# Patient Record
Sex: FEMALE | Race: BLACK OR AFRICAN AMERICAN | Employment: OTHER | URBAN - METROPOLITAN AREA
[De-identification: names, ages, dates, MRNs, and addresses within clinical notes are randomized per-mention and may not be internally consistent; named-entity substitution may affect disease eponyms.]

---

## 2017-04-24 ENCOUNTER — OFFICE VISIT (OUTPATIENT)
Dept: SURGERY | Age: 57
End: 2017-04-24

## 2017-04-24 DIAGNOSIS — E66.01 OBESITY, MORBID, BMI 40.0-49.9 (HCC): Primary | ICD-10-CM

## 2017-04-27 VITALS — HEIGHT: 66 IN | WEIGHT: 259 LBS | BODY MASS INDEX: 41.62 KG/M2

## 2017-05-22 ENCOUNTER — CLINICAL SUPPORT (OUTPATIENT)
Dept: SURGERY | Age: 57
End: 2017-05-22

## 2017-05-22 VITALS — WEIGHT: 262 LBS | HEIGHT: 66 IN | BODY MASS INDEX: 42.11 KG/M2

## 2017-05-22 DIAGNOSIS — E66.01 OBESITY, MORBID, BMI 40.0-49.9 (HCC): Primary | ICD-10-CM

## 2017-05-22 NOTE — PATIENT INSTRUCTIONS
Goals: 1. Eat within 2 hours of waking up; can use a protein shake as a meal replacement or snack  2. Eat 3 balanced meals per day (no skipping meals)  3.  Decrease your high sugar/high carbohydrate snacks and replace with high protein snacks

## 2017-06-12 ENCOUNTER — OFFICE VISIT (OUTPATIENT)
Dept: SURGERY | Age: 57
End: 2017-06-12

## 2017-06-12 VITALS
OXYGEN SATURATION: 100 % | RESPIRATION RATE: 16 BRPM | HEART RATE: 84 BPM | BODY MASS INDEX: 41.78 KG/M2 | SYSTOLIC BLOOD PRESSURE: 145 MMHG | HEIGHT: 66 IN | WEIGHT: 260 LBS | DIASTOLIC BLOOD PRESSURE: 63 MMHG

## 2017-06-12 DIAGNOSIS — M17.11 PRIMARY OSTEOARTHRITIS OF RIGHT KNEE: ICD-10-CM

## 2017-06-12 DIAGNOSIS — E66.01 MORBID OBESITY DUE TO EXCESS CALORIES (HCC): Primary | ICD-10-CM

## 2017-06-12 DIAGNOSIS — I10 ESSENTIAL HYPERTENSION: ICD-10-CM

## 2017-06-12 DIAGNOSIS — J45.20 MILD INTERMITTENT ASTHMA WITHOUT COMPLICATION: ICD-10-CM

## 2017-06-12 DIAGNOSIS — R73.03 BORDERLINE DIABETES: ICD-10-CM

## 2017-06-12 DIAGNOSIS — E66.01 MORBID OBESITY WITH BMI OF 40.0-44.9, ADULT (HCC): ICD-10-CM

## 2017-06-12 DIAGNOSIS — T41.45XA: ICD-10-CM

## 2017-06-12 DIAGNOSIS — M17.12 PRIMARY OSTEOARTHRITIS OF LEFT KNEE: ICD-10-CM

## 2017-06-12 DIAGNOSIS — K21.9 GASTROESOPHAGEAL REFLUX DISEASE WITHOUT ESOPHAGITIS: ICD-10-CM

## 2017-06-12 NOTE — PROGRESS NOTES
Bariatric Surgery Consultation    Subjective: The patient is a 61 y.o. obese female with a Body mass index is 41.97 kg/(m^2). Akiratommy Dcs The patient is at her heaviest weight for the past 5 years. she has been overweight since  Age 35.   she has been considering surgery since last year. she desires surgery at this time because of multiple health concerns and their lifestyle issues which are hindered by their weight. she has been referred by his family physician Dr Daksha Wren for evaluation and treatment of their obesity via surgical intervention. Luna Velez has tried multiple diets in her lifetime most recently tried physician supervised, behavior modification, unsupervised diets, Weight Watchers, Atkins and prescription diet pills    Bariatric comorbidities present are   Patient Active Problem List   Diagnosis Code    Osteoarthritis of left knee M17.12    Morbid obesity with BMI of 40.0-44.9, adult ([facility]) E66.01, Z68.41    Osteoarthritis of right knee M17.11    Borderline diabetes R73.03       The patient is considering laparoscopic sleeve gastrectomy for surgical weight loss due to their ineffective progress with medical forms of weight loss and the urging of their physician who cares for their primary medical issues. The patient  now presents  for consideration for weight loss surgery understanding the benefits of this over a medical approach of weight loss as was discussed in our presentation on weight loss surgery. They have discussed their plans both with their family and primary care physician who is in support of their pursuit of such. The patient has not had health issues as of late and denies and gastrointestinal disturbances other than what is outlined below in their review of symptoms. All of their prior evaluations available by both their PCP's and specialists physicians have been reviewed today either in the Care Everywhere portal or scanned under the media tab.     I have spent a large portion of my initial consultation today reviewing the patients current dietary habits which have contributed to their health issues and obesity. I have suggested to them personally a dietary regimen that they can initiate now to help with their status as it pertains to their weight. They understand that the most important aspect of their journey through their weight loss endeavor will be their adherence to a new lifestyle of healthy eating behavior. They also understand that an adherence to an exercise program will not only help with weight loss but is ultimately important in weight maintenance. The patients goal weight is 170lb. These goals are consistent with expected outcomes of their desired operation. her Medical goals are resolution of these health issues. Patient Active Problem List    Diagnosis Date Noted    Morbid obesity with BMI of 40.0-44.9, adult ([facility] [address]     Osteoarthritis of right knee     Borderline diabetes     Osteoarthritis of left knee 2016     Past Surgical History:   Procedure Laterality Date    HX  SECTION   &     HX HERNIA REPAIR      umbilical x2    HX HYSTERECTOMY      RYNE    HX LAP CHOLECYSTECTOMY      [address] [facility] ORTHOP[facility]EDIC Right     partial knee replacement    HX [facility]ROIDECTOMY       [facility]    HX TONSILLECTOMY        Social History   Substance Use Topics    Smoking status: Former Smoker     Quit date: 3/9/2002    Smokeless tobacco: Never Used    Alcohol use Yes      Comment: 2 alcohol beverages per month      No family history on file. Current Outpatient Prescriptions   Medication Sig Dispense Refill    cranberry extract 450 mg tab Take 450 mg by mouth nightly.  gabapentin (NEURONTIN) 300 mg capsule Take 900 mg by mouth two (2) times a day.  levothyroxine (SYNTHROID) 125 mcg tablet Take 112 mcg by mouth Daily (before breakfast).       zolpidem (AMBIEN) 10 mg tablet Take 10 mg by mouth nightly as needed for Sleep.  cyclobenzaprine (FLEXERIL) 10 mg tablet Take 10 mg by mouth every eight (8) hours as needed for Muscle Spasm(s).  dimethyl fumarate 240 mg cpDR Take 240 mg by mouth two (2) times a day.  baclofen (LIORESAL) 10 mg tablet Take 10 mg by mouth two (2) times a day. Indications: MS      telmisartan (MICARDIS) 40 mg tablet Take 40 mg by mouth daily. Indications: HYPERTENSION      cycloSPORINE (RESTASIS) 0.05 % ophthalmic emulsion Administer 1 Drop to both eyes two (2) times a day.  pantoprazole (PROTONIX) 40 mg tablet Take 40 mg by mouth nightly. Indications: GASTROESOPHAGEAL REFLUX      EPINEPHrine (EPIPEN) 0.3 mg/0.3 mL (1:1,000) injection 0.3 mg by IntraMUSCular route once as needed for Anaphylaxis (Iodine/Shellfish). Indications: ANAPHYLAXIS      DULoxetine (CYMBALTA) 60 mg capsule Take 60 mg by mouth every evening.  Indications: ANXIETY WITH DEPRESSION, NEUROPATHIC PAIN      oxyCODONE-acetaminophen (PERCOCET) 5-325 mg per tablet Take 1 to 2 tab PO q 4-6 hrs prn pain 60 Tab 0     Allergies   Allergen Reactions    Shellfish Derived Shortness of Breath and Swelling    Sulfa (Sulfonamide Antibiotics) Itching          Review of Systems:       General - No history or complaints of unexpected fever, chills, or weight loss  Head/Neck - No history or complaints of headache, diplopia, dysphagia, hearing loss  Cardiac - No history or complaints of chest pain, palpitations, murmur, or shortness of breath  Pulmonary - No history or complaints of shortness of breath, productive cough, hemoptysis  Gastrointestinal - minimal reflux,no  abdominal pain, obstipation/constipation or blood per rectum  Genitourinary - No history or complaints of hematuria/dysuria, stress urinary incontinence symptoms, or renal lithiasis  Musculoskeletal - severe joint pain in their knees,  no muscular weakness  Hematologic - No history or complaints of bleeding disorders,  No blood transfusions  Neurologic - No history or complaints of  migraine headaches, seizure activity, syncopal episodes, TIA or stroke  Integumentary - No history or complaints of rashes, abnormal nevi, skin cancer  Gynecological - n/a               Objective:     Visit Vitals    /63 (BP 1 Location: Left arm, BP Patient Position: Sitting)    Pulse 84    Resp 16    Ht 5' 6\" (1.676 m)    Wt 117.9 kg (260 lb)    SpO2 100%    BMI 41.97 kg/m2       Physical Examination: General appearance - alert, well appearing, and in no distress and oriented to person, place, and time  Mental status - alert, oriented to person, place, and time, normal mood, behavior, speech, dress, motor activity, and thought processes  Eyes - pupils equal and reactive, extraocular eye movements intact, sclera anicteric, left eye normal, right eye normal  Ears - bilateral TM's and external ear canals normal, right ear normal, left ear normal  Nose - normal and patent, no erythema, discharge or polyps  Mouth - mucous membranes moist, pharynx normal without lesions  Neck - supple, no significant adenopathy  Lymphatics - no palpable lymphadenopathy, no hepatosplenomegaly  Chest - clear to auscultation, no wheezes, rales or rhonchi, symmetric air entry  Heart - normal rate, regular rhythm, normal S1, S2, no murmurs, rubs, clicks or gallops  Abdomen - soft, nontender, nondistended, no masses or organomegaly  Back exam - full range of motion, no tenderness, palpable spasm or pain on motion  Neurological - alert, oriented, normal speech, no focal findings or movement disorder noted  Musculoskeletal - no joint tenderness, deformity or swelling  Extremities - peripheral pulses normal, no pedal edema, no clubbing or cyanosis  Skin - normal coloration and turgor, no rashes, no suspicious skin lesions noted    Labs:       No results found for this or any previous visit (from the past 1440 hour(s)).     Assessment:     Morbid obesity with comorbidity    Plan:     laparoscopic sleeve gastrectomy    This is a 61 y.o. female with a BMI of Body mass index is 41.97 kg/(m^2). and the weight-related co-morbidties as noted below. Ramonita Snyder meets the NIH criteria for bariatric surgery based upon the BMI of Body mass index is 41.97 kg/(m^2). and multiple weight-related co-morbidties. Ramonita Snyder has elected laparoscopic sleeve gastrectomy as her intervention of choice for treatment of morbid obestiy through surgical means secondary to its safety profile, rapid return to work  and decreases in operative risks over gastric bypass. In the office today, following Fabiola's history and physical examination, a 30 minute discussion regarding the anatomic alterations for the laparoscopic sleeve gastrectomy was undertaken. The dietary expectations and the patient and physician dependent factors for success were thoroughly discussed, to include the need for interval follow-up and long-term dietary changes associated with success. The possible complications of the sleeve gastrectomy  were also discussed, to include;death, DVT/PE, staple line leak, bleeding, stricture formation, infection, nutritional deficiencies and sleeve dilation. Specific weight related outcomes for success were also discussed with an emphasis on careful and close follow-up with the first year and eating behavior modification as the baseline and cyclical hunger return. The patient expressed an understanding of the above factors, and her questions were answered in their entirety. In addition, the patient attended a 1.5 hour power point seminar regarding obesity, surgical weight loss including, adjustable gastric band, gastric bypass, and sleeve gastrectomy. This discussion contrasted the different surgical techniques, mechanisms of actions and expected outcomes, and surgical and medical risks associated with each procedure.   During this seminar, there was a long question and answer session where each questions was answered until there were no additional questions. Today, the patient had all of her questions answered and desires to proceed with  bariatric surgery initially choosing sleeve gastrectomy as her surgical option. Secondary Diagnoses:     Adult Onset Diabetes - The patient has natalia given a very low carbohydrate diet preoperatively along with instructions to monitor their blood sugars on a regular daily basis. When  their surgery is performed  we will be monitoring the patient with sliding scale insulin and accuchecks.  Based on those values we will determine whether the patient needs a reduction of those medications postoperatively or total removal of those medications on discharge.  We will have the patient continue accuchecks postoperatively while at home also and report to me or their family physician for appropriate adjustments as needed.  The patient also understands that in the event of uncontrolled blood sugar preoperatively that we may choose to postpone their surgery. Dietary Intervention  - The patient is currently scheduled to see or has been followed by a bariatric nutritionist for an attempt at preoperative weight loss as has been dictated by their insurance carrier. They will be assessed at various times during their follow up to evaluate their progress depending on the length of time that is required once again by their carrier. I have explained the importance of preoperative weight loss and the benefits regarding lower surgical risk and also assisting the patient in reaching their weight loss goal.  Finally they understand there is a physiologic benefit from the standpoint of hepatic volume reduction and reduction of central visceral adiposity preoperatively.   I have reiterated the importance of a low carbohydrate and high protein regimen to achieve their stated goal. I have reviewed their current eating behavior prior to this encounter and explained to them in an exhaustive fashion the appropriate diet that they should adhere to. They have been encouraged to loose weight pre operatively and understand it is our prerogative to cancel surgery or postpone their procedure in the event of significant weight gain. GERD -The patient understands that weight loss surgery is not a guaranteed cure for reflux disease but does understand the benefits that weight loss can have on reflux disease.  They also understand that at the time of surgery the gastroesophageal junction will be evaluated for the presence of a diaphragmatic hernia.  Hernias will be corrected always with the gastric band and sleeve gastrectomy procedures, but only on a case by case basis with the gastric bypass if it prevents our ability to perform the operation at hand, or if I feel that they would benefit long term with correction of this issue.  The patient also understands that neither weight loss surgery nor repair of a diaphragmatic hernia repair guarantees the complete cessation of the disease. They also understand there is a possibility of recurrence with a simple crural repair as is performed with these procedures. They understand they may have to continue their medications in the postoperative period. They have a good understanding that the gastric bypass procedure is better suited to total resolution of this issue and that neither the Lap Band nor sleeve gastrectomy is considered a curative procedure as it pertains to this diagnosis. Hypertension - The patient has a clear understanding of how weight loss improves hypertension as a whole, but also they understand that there is a significant genetic component to this disease process.  We will monitor the patients blood pressure while in the hospital and the plan would be to continue those medications postoperatively.  If a diuretic is being used we will stop them on discharge to prevent dehydration particularly with the sleeve gastrectomy and the gastric bypass procedures.  They will be instructed to monitor their blood pressure postoperatively while at home and notify their primary care physician in the event of any significantly high or uncharacteristic readings. Weight Related Arthritis -The patient understands the benefits that weight loss surgery can have on their arthritis but also understands that weight loss is not a guaranteed cure and relief of symptoms is often dependent on the severity of the underlying disease.  The patient also understands that traditional pharmaceutical treatments for this diagnosis are usually unavailable to post-operative weight loss patients due to the effects on the gastrointestinal tract particularly with the gastric bypass and to a lesser effect with the sleeve gastrectomy.  Any changes to the patients medication treatment will ultimately be made the patients PCP with input by our office. Hyperlipidemia - The patient understands that studies show that almost all patient will realize an improvement in their lipid profile with weight loss that occurs with these procedures. They however also understand that hyperlipidemia is a multifactorial disease particularly as it pertains to their genetic background and that there is no guarantee toward cure  of this issue. We will resume their medications both pre operatively and immediately postoperatively as this tends to decrease any post operative cardiac events.  The patient will follow up with their family physician in the postoperative period with plans to repeat their lipid panel 2-3 month postoperative for potential adjustment or removal of these medications. Multiple Sclerosis- Patient well controlled on meds and has let her Neurologist know she is having surgery.     Signed By: Solomon Regalado MD     June 12, 2017

## 2017-06-12 NOTE — PATIENT INSTRUCTIONS
Body Mass Index: Care Instructions  Your Care Instructions    Body mass index (BMI) can help you see if your weight is raising your risk for health problems. It uses a formula to compare how much you weigh with how tall you are. · A BMI lower than 18.5 is considered underweight. · A BMI between 18.5 and 24.9 is considered healthy. · A BMI between 25 and 29.9 is considered overweight. A BMI of 30 or higher is considered obese. If your BMI is in the normal range, it means that you have a lower risk for weight-related health problems. If your BMI is in the overweight or obese range, you may be at increased risk for weight-related health problems, such as high blood pressure, heart disease, stroke, arthritis or joint pain, and diabetes. If your BMI is in the underweight range, you may be at increased risk for health problems such as fatigue, lower protection (immunity) against illness, muscle loss, bone loss, hair loss, and hormone problems. BMI is just one measure of your risk for weight-related health problems. You may be at higher risk for health problems if you are not active, you eat an unhealthy diet, or you drink too much alcohol or use tobacco products. Follow-up care is a key part of your treatment and safety. Be sure to make and go to all appointments, and call your doctor if you are having problems. It's also a good idea to know your test results and keep a list of the medicines you take. How can you care for yourself at home? · Practice healthy eating habits. This includes eating plenty of fruits, vegetables, whole grains, lean protein, and low-fat dairy. · If your doctor recommends it, get more exercise. Walking is a good choice. Bit by bit, increase the amount you walk every day. Try for at least 30 minutes on most days of the week. · Do not smoke. Smoking can increase your risk for health problems. If you need help quitting, talk to your doctor about stop-smoking programs and medicines. These can increase your chances of quitting for good. · Limit alcohol to 2 drinks a day for men and 1 drink a day for women. Too much alcohol can cause health problems. If you have a BMI higher than 25  · Your doctor may do other tests to check your risk for weight-related health problems. This may include measuring the distance around your waist. A waist measurement of more than 40 inches in men or 35 inches in women can increase the risk of weight-related health problems. · Talk with your doctor about steps you can take to stay healthy or improve your health. You may need to make lifestyle changes to lose weight and stay healthy, such as changing your diet and getting regular exercise. If you have a BMI lower than 18.5  · Your doctor may do other tests to check your risk for health problems. · Talk with your doctor about steps you can take to stay healthy or improve your health. You may need to make lifestyle changes to gain or maintain weight and stay healthy, such as getting more healthy foods in your diet and doing exercises to build muscle. Where can you learn more? Go to http://astrid-jesusita.info/. Enter S176 in the search box to learn more about \"Body Mass Index: Care Instructions. \"  Current as of: January 23, 2017  Content Version: 11.2  © 6716-1519 [employer]. Care instructions adapted under license by [employer] (which disclaims liability or warranty for this information). If you have questions about a medical condition or this instruction, always ask your healthcare professional. [place] [address] any warranty or liability for your use of this information. Patient Instructions      1. Continue to monitor carbohydrate and protein intake- remember to keep your           total  carbohydrates to 50 grams or less per day for best results.   2. Remember hydration goals - usually 48 to 64 ounces of liquids per day  3. Continue to work towards exercise goals - minimum 3 days per week of 45          minutes to  1 hour at a time. 4. Remember to take vitamins as directed        Supplement Resource Guide    Importance of Protein:   Maintains lean body mass, produces antibodies to fight off infections, heals wounds, minimizes hair loss, helps to give you energy, helps with satiety, and keeping you full between meals. Importance of Calcium:  Needed for healthy bones and teeth, normal blood clotting, and nervous system functioning, higher risk of osteoporosis and bone disease with non-compliance. Importance of Multivitamins: Many functions. Supply you with extra nutrients that you may be missing from food. May lead to iron deficiency anemia, weakness, fatigue, and many other symptoms with non-compliance. Importance of B Vitamins:  Important for red blood cell formation, metabolism, energy, and helps to maintain a healthy nervous system. Protein Supplement  Find one you like now. Use immediately after surgery. Look for:  35-50g protein each day from your protein supplement once you reach the progression diet. 0-3 g fat per serving  0-3 g sugar per serving    Protein drinks should be split in separate dosages. Recommend: Lifelong  1 year + Calcium Supplement:     Start taking within a month after surgery. Look for: Calcium Citrate Plus D (1500 mg per day)  Recommend: Citracal     .          Avoid chocolate chewable calcium. Can use chewable bariatric or [employer] brand or similar chewable. The body cannot absorb more than 500-600 mg @ a time. Take for Life Multi-vitamin Supplement:      1st Month After Surgery: Any complete chewable, such as: [place]s Complete chewables. Avoid [place] sours or gummies. They lack iron and other important nutrients and also have added sugar.     Continue with chewable vitamin or change to adult complete multivitamin one month after surgery. Menstruating women can take a prenatal vitamin. Make sure has at least 18 mg iron and [id number] mcg folic acid):    Vitamin B12, B Complex Vitamin, and Biotin  Start taking within a month after surgery. Vitamin B12:  1000 mcg of Vitamin B12 three times weekly    Must take sublingually (meaning you take it under your tongue) or in a liquid drop form for easy absorption. B Complex Vitamin: Take a pill or liquid drop form once daily. Biotin: This vitamin can help prevent hair loss.     Recommend 5mg   (5000 mcg) a day  Biotin is Optional

## 2017-06-19 ENCOUNTER — CLINICAL SUPPORT (OUTPATIENT)
Dept: SURGERY | Age: 57
End: 2017-06-19

## 2017-06-19 VITALS — WEIGHT: 260 LBS | HEIGHT: 66 IN | BODY MASS INDEX: 41.78 KG/M2

## 2017-06-19 DIAGNOSIS — E66.01 MORBID OBESITY WITH BMI OF 40.0-44.9, ADULT (HCC): Primary | ICD-10-CM

## 2017-06-19 NOTE — PROGRESS NOTES
Rogelio Hare participated in an educational session on the importance of starting to make healthy choices prior to weight loss surgery. General healthy foods were reviewed. Diet history was reviewed. Patient set a dietary, exercise, and behavioral goal in order to start making healthy changes now.      Visit Vitals    Ht 5' 6\" (1.676 m)    Wt 117.5 kg (259 lb)    BMI 41.8 kg/m2     Mike Fuentes RD

## 2017-06-19 NOTE — PROGRESS NOTES
Medical Weight Loss Multi-Disciplinary Program    Name: Cari Ugalde   : 1960    Session# 3  Date: 2017     Height: 5' 6\" (167.6 cm)    Weight: 117.9 kg (260 lb) lbs. Body mass index is 41.97 kg/(m^2). Pounds Lost: 2    Dietary Instructions    Reviewed intake  Understanding low carbohydrates, low sugar, higher protein meals  Understanding proper portions  Instruction given for personal dietary changes  Discussed perceived compliance  Comments: Diet hx reviewed and personal dietary changes discussed. Physical Activity/Exercise    Discussed Perceived Compliance  Reasonable Goals Set  Motivation  Comments: Pt is walking for 30-45 minutes, 4-5 times per week. She plans to continue/increase exercise to 4-5 times per week for an hour. Behavior Modification    Achieving/Rewarding goals met  Positive attitude  Discussed perceived compliance  Comments: Pt is doing well exercising and plans to increase/continue. She has increased water, decreased skipping meals, decreased soda, decreased candy, and decreased portions. She is working to cut back on carbohydrate foods, stop drinking all soda and sweet tea with sugar, stop eating all candy, and set a timer to remind self to not skip meals. Can continue to replace one meal daily with a protein shake. What have you tried in the past to lose weight? [employer], [employer], fad diets such as cabbage soup, diet pills, diet centers-physician supervised, dietitian visits    Why did you not lose weight or keep weight off? Did lose weight- now thinks good, so stops the program and goes back to bad habits. Would lose only about 10 pounds. How do you think the surgery will make a difference? Amount of weight will lose, feel good about self, stick to it, prepared for the lifestyle change the surgery requires now that has been through so many diet programs.      Candidate for surgery (per RD): pending    Dietitian: Lito Kaye RD

## 2017-07-20 ENCOUNTER — CLINICAL SUPPORT (OUTPATIENT)
Dept: SURGERY | Age: 57
End: 2017-07-20

## 2017-07-20 VITALS — HEIGHT: 66 IN | BODY MASS INDEX: 41.62 KG/M2 | WEIGHT: 259 LBS

## 2017-07-20 DIAGNOSIS — E66.01 MORBID OBESITY WITH BMI OF 40.0-44.9, ADULT (HCC): Primary | ICD-10-CM

## 2017-07-20 NOTE — MR AVS SNAPSHOT
Visit Information Date & Time Provider Department Dept. Phone Encounter #  
 7/20/2017 11:00 AM TSS [facility] [place] [phone] [id number] Upcoming Health Maintenance Date Due Hepatitis C Screening 1960 Pneumococcal 19-64 Highest Risk (1 of 3 - PCV13) 11/15/1978 DTaP/Tdap/Td series (1 - Tdap) 11/15/1980 PAP AKA CERVICAL CYTOLOGY 11/15/1980 BREAST CANCER SCRN MAMMOGRAM 11/15/2009 FOBT Q 1 YEAR AGE 50-75 11/15/2009 INFLUENZA AGE 9 TO ADULT 8/1/2017 Allergies as of 7/20/2017  Review Complete On: 6/12/2017 By: Lacretia Sober Severity Noted Reaction Type Reactions Shellfish Derived High 03/09/2016   Systemic Shortness of Breath, Swelling Sulfa (Sulfonamide Antibiotics)  03/09/2016    Itching Current Immunizations  Never Reviewed No immunizations on file. Not reviewed this visit Vitals Height(growth percentile) Weight(growth percentile) BMI OB Status Smoking Status 5' 6\" (1.676 m) 259 lb (117.5 kg) 41.8 kg/m2 Hysterectomy Former Smoker BMI and BSA Data Body Mass Index Body Surface Area  
 41.8 kg/m 2 2.34 m 2 Preferred Pharmacy Pharmacy Name Phone [id number] [place] [address] [phone] Your Updated Medication List  
  
   
This list is accurate as of: 7/20/17 11:19 AM.  Always use your most recent med list.  
  
  
  
  
 baclofen 10 mg tablet Commonly known as:  LIORESAL Take 10 mg by mouth two (2) times a day. Indications: MS  
  
 cranberry extract 450 mg Tab tablet Take 450 mg by mouth nightly. cyclobenzaprine 10 mg tablet Commonly known as:  FLEXERIL Take 10 mg by mouth every eight (8) hours as needed for Muscle Spasm(s). cycloSPORINE 0.05 % ophthalmic emulsion Commonly known as:  RESTASIS Administer 1 Drop to both eyes two (2) times a day.   
  
 dimethyl fumarate 240 mg Cpdr  
 Take 240 mg by mouth two (2) times a day. DULoxetine 60 mg capsule Commonly known as:  CYMBALTA Take 60 mg by mouth every evening. Indications: ANXIETY WITH DEPRESSION, NEUROPATHIC PAIN  
  
 EPINEPHrine 0.3 mg/0.3 mL injection Commonly known as:  EPIPEN  
0.3 mg by IntraMUSCular route once as needed for Anaphylaxis (Iodine/Shellfish). Indications: ANAPHYLAXIS  
  
 gabapentin 300 mg capsule Commonly known as:  NEURONTIN Take 900 mg by mouth two (2) times a day. oxyCODONE-acetaminophen 5-325 mg per tablet Commonly known as:  PERCOCET Take 1 to 2 tab PO q 4-6 hrs prn pain  
  
 pantoprazole 40 mg tablet Commonly known as:  PROTONIX Take 40 mg by mouth nightly. Indications: GASTROESOPHAGEAL REFLUX  
  
 SYNTHROID 125 mcg tablet Generic drug:  levothyroxine Take 112 mcg by mouth Daily (before breakfast). telmisartan 40 mg tablet Commonly known as:  Izora Lakesha Take 40 mg by mouth daily. Indications: HYPERTENSION  
  
 zolpidem 10 mg tablet Commonly known as:  AMBIEN Take 10 mg by mouth nightly as needed for Sleep. Patient Instructions Goals: 1. Between now and surgery, when gets back from trip on Aug. 8, will completely stop candy and soda/sweet tea (has done this before and was successful). Will drink water and protein drinks only. 2. Use a timer to remind self to eat at least 3 meals per day, can use a protein drink for 1 meal daily. 3. Do not use Glucerna after surgery. Purchase premier. 4. Goal to get back to walking 5 days per week for 30-60 minutes. Introducing [facility]! Cameron Wong introduces [employer] patient portal. Now you can access parts of your medical record, email your doctor's office, and request medication refills online. 1. In your internet browser, go to [email or web address] 2. Click on the First Time User? Click Here link in the Sign In box. You will see the New Member Sign Up page. 3. Enter your [employer] Access Code exactly as it appears below. You will not need to use this code after youve completed the sign-up process. If you do not sign up before the expiration date, you must request a new code. · [employer] Access Code: [email or web address] Expires: 9/14/2017  2:23 PM 
 
4. Enter the last four digits of your Social Security Number (xxxx) and Date of Birth (mm/dd/yyyy) as indicated and click Submit. You will be taken to the next sign-up page. 5. Create a [employer] ID. This will be your [employer] login ID and cannot be changed, so think of one that is secure and easy to remember. 6. Create a [employer] password. You can change your password at any time. 7. Enter your Password Reset Question and Answer. This can be used at a later time if you forget your password. 8. Enter your e-mail address. You will receive e-mail notification when new information is available in [address] 9. Click Sign Up. You can now view and download portions of your medical record. 10. Click the Download Summary menu link to download a portable copy of your medical information. If you have questions, please visit the Frequently Asked Questions section of the [employer] website. Remember, [employer] is NOT to be used for urgent needs. For medical emergencies, dial 911. Now available from your iPhone and Android! Please provide this summary of care documentation to your next provider. Your primary care clinician is listed as Luz Maria Dominique. If you have any questions after today's visit, please call [phone].

## 2017-07-20 NOTE — PROGRESS NOTES
Medical Weight Loss Multi-Disciplinary Program    Name: Norma Davis   : 1960    Session# 4  Date: 2017     Height: 5' 6\" (167.6 cm)    Weight: 117.5 kg (259 lb) lbs. Body mass index is 41.8 kg/(m^2). Pounds Lost: 1    Dietary Instructions    Reviewed intake  Understanding low carbohydrates, low sugar, higher protein meals  Understanding proper portions  Instruction given for personal dietary changes  Discussed perceived compliance  Comments: Diet hx reviewed and personal dietary changes discussed. Diet hx: B-skipped, S-banana taffy, L-6 chicken nuggets and benoit and sweet tea, D- cubed steak and gravy, rice and green beans, more banana taffy, water    Physical Activity/Exercise    Discussed Perceived Compliance  Reasonable Goals Set  Motivation  Comments: Walked some this month, 4-5 times per week, 30-60 minutes, but stopped for two weeks. Goal to get back to walking 5 days per week for 30-60 minutes. Behavior Modification    Achieving/Rewarding goals met  Positive attitude  Discussed perceived compliance  Comments: Pt exercised some this month and plans to get back to walking routine. Decreased sugared beverages to no more than 1 daily (down from 3 per day). Did OK the first week at not skipping meals, then got ready and went on a trip and did not keep up with meals- skipping again. Did decrease soda and candy. Goals between now and surgery:  1. Between now and surgery, when gets back from trip on Aug. 8, will completely stop candy and soda/sweet tea (has done this before and was successful). Will drink water, sugar free and carbonation free flavored water, and protein drinks only. 2. Use a timer to remind self to eat at least 3 meals per day, can use a protein drink for 1 meal daily. 3. Do not use Glucerna after surgery. Purchase Premier. 4. Goal to get back to walking 5 days per week for 30-60 minutes.      Candidate for surgery (per RD): YES    Dietitian: Ed Balderrama RD

## 2017-07-20 NOTE — PATIENT INSTRUCTIONS
Goals: 1. Between now and surgery, when gets back from trip on Aug. 8, will completely stop candy and soda/sweet tea (has done this before and was successful). Will drink water and protein drinks only. 2. Use a timer to remind self to eat at least 3 meals per day, can use a protein drink for 1 meal daily. 3. Do not use Glucerna after surgery. Purchase premier. 4. Goal to get back to walking 5 days per week for 30-60 minutes.

## 2017-08-02 ENCOUNTER — APPOINTMENT (OUTPATIENT)
Dept: GENERAL RADIOLOGY | Age: 57
End: 2017-08-02
Attending: SPECIALIST
Payer: MEDICARE

## 2017-08-02 ENCOUNTER — HOSPITAL ENCOUNTER (OUTPATIENT)
Age: 57
Setting detail: OUTPATIENT SURGERY
Discharge: HOME | End: 2017-08-02
Attending: SPECIALIST | Admitting: SPECIALIST
Payer: MEDICARE

## 2017-08-02 VITALS
SYSTOLIC BLOOD PRESSURE: 145 MMHG | WEIGHT: 263.5 LBS | TEMPERATURE: 98.1 F | RESPIRATION RATE: 14 BRPM | OXYGEN SATURATION: 100 % | BODY MASS INDEX: 42.53 KG/M2 | DIASTOLIC BLOOD PRESSURE: 69 MMHG | HEART RATE: 81 BPM

## 2017-08-02 DIAGNOSIS — E66.01 MORBID OBESITY (HCC): ICD-10-CM

## 2017-08-02 PROCEDURE — 74240 X-RAY XM UPR GI TRC 1CNTRST: CPT

## 2017-08-02 PROCEDURE — 76040000019: Performed by: SPECIALIST

## 2017-08-02 PROCEDURE — 74011000255 HC RX REV CODE- 255: Performed by: SPECIALIST

## 2017-08-02 NOTE — IP AVS SNAPSHOT
[address] [address] 
[phone] Patient: Cally Peter MRN: [id number] IIT:08[birth year]11 Current Discharge Medication List  
  
ASK your doctor about these medications Dose & Instructions Dispensing Information Comments Morning Noon Evening Bedtime  
 baclofen 10 mg tablet Commonly known as:  LIORESAL Your last dose was: Your next dose is:    
   
   
 Dose:  10 mg Take 10 mg by mouth two (2) times a day. Indications: MS Refills:  0  
     
   
   
   
  
 cranberry extract 450 mg Tab tablet Your last dose was: Your next dose is:    
   
   
 Dose:  450 mg Take 450 mg by mouth nightly. Refills:  0  
     
   
   
   
  
 cyclobenzaprine 10 mg tablet Commonly known as:  FLEXERIL Your last dose was: Your next dose is:    
   
   
 Dose:  10 mg Take 10 mg by mouth every eight (8) hours as needed for Muscle Spasm(s). Refills:  0  
     
   
   
   
  
 cycloSPORINE 0.05 % ophthalmic emulsion Commonly known as:  RESTASIS Your last dose was: Your next dose is:    
   
   
 Dose:  1 Drop Administer 1 Drop to both eyes two (2) times a day. Refills:  0  
     
   
   
   
  
 dimethyl fumarate 240 mg Cpdr  
   
Your last dose was: Your next dose is:    
   
   
 Dose:  240 mg Take 240 mg by mouth two (2) times a day. Refills:  0 DULoxetine 60 mg capsule Commonly known as:  CYMBALTA Your last dose was: Your next dose is:    
   
   
 Dose:  60 mg Take 60 mg by mouth every evening. Indications: ANXIETY WITH DEPRESSION, NEUROPATHIC PAIN Refills:  0 EPINEPHrine 0.3 mg/0.3 mL injection Commonly known as:  Diana Sanchez Your last dose was:     
   
Your next dose is:    
   
   
 Dose:  0.3 mg  
0.3 mg by IntraMUSCular route once as needed for Anaphylaxis (Iodine/Shellfish). Indications: ANAPHYLAXIS Refills:  0  
     
   
   
   
  
 gabapentin 300 mg capsule Commonly known as:  NEURONTIN Your last dose was: Your next dose is:    
   
   
 Dose:  900 mg Take 900 mg by mouth two (2) times a day. Refills:  0  
     
   
   
   
  
 oxyCODONE-acetaminophen 5-325 mg per tablet Commonly known as:  PERCOCET Your last dose was: Your next dose is: Take 1 to 2 tab PO q 4-6 hrs prn pain Quantity:  60 Tab Refills:  0  
     
   
   
   
  
 pantoprazole 40 mg tablet Commonly known as:  PROTONIX Your last dose was: Your next dose is:    
   
   
 Dose:  40 mg Take 40 mg by mouth nightly. Indications: GASTROESOPHAGEAL REFLUX Refills:  0  
     
   
   
   
  
 SYNTHROID 125 mcg tablet Generic drug:  levothyroxine Your last dose was: Your next dose is:    
   
   
 Dose:  112 mcg Take 112 mcg by mouth Daily (before breakfast). Refills:  0  
     
   
   
   
  
 telmisartan 40 mg tablet Commonly known as:  Kya Cart Your last dose was: Your next dose is:    
   
   
 Dose:  40 mg Take 40 mg by mouth daily. Indications: HYPERTENSION Refills:  0  
     
   
   
   
  
 zolpidem 10 mg tablet Commonly known as:  AMBIEN Your last dose was: Your next dose is:    
   
   
 Dose:  10 mg Take 10 mg by mouth nightly as needed for Sleep. Refills:  0

## 2017-08-02 NOTE — PROCEDURES
Sandra American   : 1960  Medical Record [id number]            PREPROCEDURE DIAGNOSIS: This patient is preoperative for laparoscopic sleeve gastrectomyprocedure with a history of mild reflux disease. POSTPROCEDURE DIAGNOSIS: This patient is preoperative for laparoscopic sleeve gastrectomyprocedure with a history of mild reflux disease. PROCEDURES PERFORMED: Upper GI study with barium. ESTIMATED BLOOD LOSS: None. SPECIMENS: None. STATEMENT OF MEDICAL NECESSITY: The patient is a patient with a  longstanding history of obesity. They are now considering the laparoscopic sleeve gastrectomyprocedure as a means of surgical weight control and due to their history of reflux disease and are being assessed preoperatively for such. DESCRIPTION OF PROCEDURE: The patient was brought to the fluoroscopy unit and  was given thin barium. On swallowing of barium, they were noted to have  normal peristalsis of their esophagus. They had prompt filling of distal  esophagus with tapering into the gastroesophageal junction. There was no evidence of a hiatal hernia present. Contrast then filled the gastric cardia, fundus,body and pre pyloric region with no abnormalities noted. Contrast then exited the pylorus in normal fashion. No obstruction was noted. There was no evidence of reflux noted.     (normal anatomy)    Procedure note as dictated by  Gallo Mccoy MD

## 2017-08-02 NOTE — IP AVS SNAPSHOT
[address] [address] 
[phone] Patient: Cally Peter MRN: [id number] DS You are allergic to the following Allergen Reactions Shellfish Derived Shortness of Breath Swelling Sulfa (Sulfonamide Antibiotics) Itching Recent Documentation Weight BMI OB Status Smoking Status 119.5 kg 42.53 kg/m2 Hysterectomy Former Smoker Emergency Contacts Name Discharge Info Relation Home Work Mobile Jm Madrid DISCHARGE CAREGIVER [3] Spouse [3] [phone] About your hospitalization You were admitted on:  2017 You last received care in the:  [facility] ENDOS[facility] You were discharged on:  2017 Unit phone number:  [phone] Why you were hospitalized Your primary diagnosis was:  Not on File Providers Seen During Your Hospitalizations Provider Role Specialty Primary office phone Ha Fuentes MD Attending Provider General Surgery [phone] Your Primary Care Physician (PCP) Primary Care Physician Office Phone Dianne [address]  
 [facility] 461 [phone] Follow-up Information None Current Discharge Medication List  
  
ASK your doctor about these medications Dose & Instructions Dispensing Information Comments Morning Noon Evening Bedtime  
 baclofen 10 mg tablet Commonly known as:  LIORESAL Your last dose was: Your next dose is:    
   
   
 Dose:  10 mg Take 10 mg by mouth two (2) times a day. Indications: MS Refills:  0  
     
   
   
   
  
 cranberry extract 450 mg Tab tablet Your last dose was: Your next dose is:    
   
   
 Dose:  450 mg Take 450 mg by mouth nightly. Refills:  0  
     
   
   
   
  
 cyclobenzaprine 10 mg tablet Commonly known as:  FLEXERIL Your last dose was: Your next dose is:    
   
   
 Dose:  10 mg Take 10 mg by mouth every eight (8) hours as needed for Muscle Spasm(s). Refills:  0  
     
   
   
   
  
 cycloSPORINE 0.05 % ophthalmic emulsion Commonly known as:  RESTASIS Your last dose was: Your next dose is:    
   
   
 Dose:  1 Drop Administer 1 Drop to both eyes two (2) times a day. Refills:  0  
     
   
   
   
  
 dimethyl fumarate 240 mg Cpdr  
   
Your last dose was: Your next dose is:    
   
   
 Dose:  240 mg Take 240 mg by mouth two (2) times a day. Refills:  0 DULoxetine 60 mg capsule Commonly known as:  CYMBALTA Your last dose was: Your next dose is:    
   
   
 Dose:  60 mg Take 60 mg by mouth every evening. Indications: ANXIETY WITH DEPRESSION, NEUROPATHIC PAIN Refills:  0 EPINEPHrine 0.3 mg/0.3 mL injection Commonly known as:  Bridget Ramos Your last dose was: Your next dose is:    
   
   
 Dose:  0.3 mg  
0.3 mg by IntraMUSCular route once as needed for Anaphylaxis (Iodine/Shellfish). Indications: ANAPHYLAXIS Refills:  0  
     
   
   
   
  
 gabapentin 300 mg capsule Commonly known as:  NEURONTIN Your last dose was: Your next dose is:    
   
   
 Dose:  900 mg Take 900 mg by mouth two (2) times a day. Refills:  0  
     
   
   
   
  
 oxyCODONE-acetaminophen 5-325 mg per tablet Commonly known as:  PERCOCET Your last dose was: Your next dose is: Take 1 to 2 tab PO q 4-6 hrs prn pain Quantity:  60 Tab Refills:  0  
     
   
   
   
  
 pantoprazole 40 mg tablet Commonly known as:  PROTONIX Your last dose was: Your next dose is:    
   
   
 Dose:  40 mg Take 40 mg by mouth nightly. Indications: GASTROESOPHAGEAL REFLUX Refills:  0  
     
   
   
   
  
 SYNTHROID 125 mcg tablet Generic drug:  levothyroxine Your last dose was: Your next dose is:    
   
   
 Dose:  112 mcg Take 112 mcg by mouth Daily (before breakfast). Refills:  0  
     
   
   
   
  
 telmisartan 40 mg tablet Commonly known as:  Patricia Hdz Your last dose was: Your next dose is:    
   
   
 Dose:  40 mg Take 40 mg by mouth daily. Indications: HYPERTENSION Refills:  0  
     
   
   
   
  
 zolpidem 10 mg tablet Commonly known as:  AMBIEN Your last dose was: Your next dose is:    
   
   
 Dose:  10 mg Take 10 mg by mouth nightly as needed for Sleep. Refills:  0 Discharge Instructions Verbal and written post adjustment / UGI instructions given. Patient acknowledges understanding. Discussed diet, activities, and s/s that should be reported. Encouraged to call to schedule next appointment and to call with any questions or concerns. Discharge Orders None Introducing [facility]! Marcelino Alexandra introduces [employer] patient portal. Now you can access parts of your medical record, email your doctor's office, and request medication refills online. 1. In your internet browser, go to [email or web address] 2. Click on the First Time User? Click Here link in the Sign In box. You will see the New Member Sign Up page. 3. Enter your [employer] Access Code exactly as it appears below. You will not need to use this code after youve completed the sign-up process. If you do not sign up before the expiration date, you must request a new code. · [employer] Access Code: [email or web address] Expires: 9/14/2017  2:23 PM 
 
4. Enter the last four digits of your Social Security Number (xxxx) and Date of Birth (mm/dd/yyyy) as indicated and click Submit. You will be taken to the next sign-up page. 5. Create a [employer] ID. This will be your [employer] login ID and cannot be changed, so think of one that is secure and easy to remember. 6. Create a [employer] password. You can change your password at any time. 7. Enter your Password Reset Question and Answer. This can be used at a later time if you forget your password. 8. Enter your e-mail address. You will receive e-mail notification when new information is available in [address] 9. Click Sign Up. You can now view and download portions of your medical record. 10. Click the Download Summary menu link to download a portable copy of your medical information. If you have questions, please visit the Frequently Asked Questions section of the [employer] website. Remember, [employer] is NOT to be used for urgent needs. For medical emergencies, dial 911. Now available from your iPhone and Android! General Information Please provide this summary of care documentation to your next provider. Patient Signature:  ____________________________________________________________ Date:  ____________________________________________________________  
  
Aparna Manrique Provider Signature:  ____________________________________________________________ Date:  ____________________________________________________________

## 2017-08-03 DIAGNOSIS — I10 ESSENTIAL HYPERTENSION: Primary | ICD-10-CM

## 2017-08-03 DIAGNOSIS — Z01.812 BLOOD TESTS PRIOR TO TREATMENT OR PROCEDURE: ICD-10-CM

## 2017-08-03 DIAGNOSIS — K21.9 GASTROESOPHAGEAL REFLUX DISEASE WITHOUT ESOPHAGITIS: ICD-10-CM

## 2017-08-03 DIAGNOSIS — E66.01 MORBID OBESITY WITH BMI OF 40.0-44.9, ADULT (HCC): ICD-10-CM

## 2017-08-25 RX ORDER — OXYCODONE AND ACETAMINOPHEN 5; 325 MG/1; MG/1
1 TABLET ORAL
Qty: 30 TAB | Refills: 0 | Status: SHIPPED | OUTPATIENT
Start: 2017-08-25 | End: 2017-09-26

## 2017-08-28 ENCOUNTER — OFFICE VISIT (OUTPATIENT)
Dept: SURGERY | Age: 57
End: 2017-08-28

## 2017-08-28 ENCOUNTER — ANESTHESIA EVENT (OUTPATIENT)
Dept: SURGERY | Age: 57
DRG: 621 | End: 2017-08-28
Payer: MEDICARE

## 2017-08-28 VITALS
WEIGHT: 258 LBS | SYSTOLIC BLOOD PRESSURE: 144 MMHG | BODY MASS INDEX: 40.49 KG/M2 | DIASTOLIC BLOOD PRESSURE: 72 MMHG | HEIGHT: 67 IN | HEART RATE: 96 BPM | RESPIRATION RATE: 16 BRPM | OXYGEN SATURATION: 100 %

## 2017-08-28 DIAGNOSIS — E66.01 MORBID OBESITY WITH BMI OF 40.0-44.9, ADULT (HCC): Primary | ICD-10-CM

## 2017-08-28 DIAGNOSIS — E66.01 MORBID OBESITY DUE TO EXCESS CALORIES (HCC): Primary | ICD-10-CM

## 2017-08-28 DIAGNOSIS — I10 ESSENTIAL HYPERTENSION: ICD-10-CM

## 2017-08-28 DIAGNOSIS — R73.03 BORDERLINE DIABETES: ICD-10-CM

## 2017-08-28 DIAGNOSIS — K21.9 GASTROESOPHAGEAL REFLUX DISEASE WITHOUT ESOPHAGITIS: ICD-10-CM

## 2017-08-28 DIAGNOSIS — E66.01 MORBID OBESITY WITH BMI OF 40.0-44.9, ADULT (HCC): ICD-10-CM

## 2017-08-28 DIAGNOSIS — M17.12 PRIMARY OSTEOARTHRITIS OF LEFT KNEE: ICD-10-CM

## 2017-08-28 DIAGNOSIS — T41.45XA: ICD-10-CM

## 2017-08-28 DIAGNOSIS — J45.20 MILD INTERMITTENT ASTHMA WITHOUT COMPLICATION: ICD-10-CM

## 2017-08-28 DIAGNOSIS — M17.11 PRIMARY OSTEOARTHRITIS OF RIGHT KNEE: ICD-10-CM

## 2017-08-28 NOTE — PROGRESS NOTES
Appears to have a good understanding of the diet progression, food choices, and dietary/exercise habits for successful weight loss and nourishment after surgery. The class material included: post-op diet progression, including liquid, pureed, and low fat, low sugar food recommendations; proper food group choices, and encouraging dietary and exercise habits that lead to weight loss success.      Savanna Chow RD

## 2017-08-28 NOTE — PROGRESS NOTES
Appears to have understanding of surgical procedure, pre-op and post-op risks, and lifestyle changes. Asked appropriate questions. No

## 2017-08-28 NOTE — PROGRESS NOTES
Sleeve Gastrectomy - History and Physical    Subjective: The patient is a 61 y.o. obese female with a Body mass index is 40.71 kg/(m^2). .   she presents now to review their work   up to date to see if they are a candidate for surgery and whether or not to proceed with the previously requested procedure. Bariatric comorbidities continue to include:   Patient Active Problem List   Diagnosis Code    Osteoarthritis of left knee M17.12    Morbid obesity with BMI of 40.0-44.9, adult ([facility] [address] E66.01, Z68.41    Osteoarthritis of right knee M17.11    Borderline diabetes R73.03    Thyroid cancer ([facility] [address] C73    Rheumatoid arthritis ([facility] [address] M06.9    Morbid obesity ([facility] [address] E66.01    Hypertension I10    GERD (gastroesophageal reflux disease) K21.9    Asthma J45.909    Adverse effect of anesthesia T41.45XA      They have been generally well prior to this visit and have had no recent significant illnesses. The patient has had no gastrointestinal   issues that would preclude them from proceeding with the surgery they have chosen. Cally Peter has recently tried a preoperative   weight loss program  in addition to seeing a bariatric nutritionist preoperatively. We have discussed on at least one other occasion   about the various types of surgical weight loss procedures and they have considered these options after our initial consultation. We have once again discussed these procedures in detail and they have now decided on a surgical procedure. They present   today to discuss this and confirm that their evaluation pre operatively is acceptable to continue with surgery. The patient desires laparoscopic sleeve gastrectomy for surgical weight loss. The patients goal weight is 172 lb. (this represents a BMI of 27)    These goals are consistent with expected outcomes of their desired operation. her Medical goals are resolution of these health issues.     Patient Active Problem List    Diagnosis Date Noted    Morbid obesity with BMI of 40.0-44.9, adult ([address]     Osteoarthritis of right knee     Borderline diabetes     Thyroid cancer ([address]     Rheumatoid arthritis ([address]     Morbid obesity ([address]     Hypertension     GERD (gastroesophageal reflux disease)     Asthma     Adverse effect of anesthesia     Osteoarthritis of left knee 2016     Past Surgical History:   Procedure Laterality Date    HX  SECTION   &     HX HERNIA REPAIR      umbilical x2    HX HYSTERECTOMY  2004    [facility]    [facility]      -  [facility]    HX ORTHOPAEDIC Bilateral     partial knee replacement    HX OTHER SURGICAL      fatty tumor removal left arm    [facility]    HX TONSILLECTOMY        Social History   Substance Use Topics    Smoking status: Former Smoker     Quit date: 3/9/2002    Smokeless tobacco: Never Used    Alcohol use Yes      Comment: 2 alcohol beverages per month      History reviewed. No pertinent family history. Current Outpatient Prescriptions   Medication Sig Dispense Refill    oxyCODONE-acetaminophen (PERCOCET) 5-325 mg per tablet Take 1 Tab by mouth every four (4) hours as needed for Pain. Max Daily Amount: 6 Tabs. 30 Tab 0    celecoxib (CELEBREX) 200 mg capsule Take 200 mg by mouth daily.  DOCOSAHEXANOIC ACID/EPA (FISH OIL PO) Take 1,000 mg by mouth daily.  ibuprofen (MOTRIN) 800 mg tablet Take 800 mg by mouth two (2) times a day.  famotidine (PEPCID AC) 20 mg tablet Take 20 mg by mouth nightly. Indications: gastroesophageal reflux disease      cetirizine (ZYRTEC) 10 mg tablet Take 10 mg by mouth daily.  predniSONE (DELTASONE) 5 mg tablet Take 5 mg by mouth as needed.  flaxseed oil 1,000 mg cap Take  by mouth daily.  black cohosh 540 mg cap Take  by mouth daily.  Biotin 2,500 mcg cap Take 5,000 mcg by mouth daily.       levothyroxine (SYNTHROID) 112 mcg tablet Take 112 mcg by mouth Daily (before breakfast). Indications: ADJUNCT TO SURGERY OR RADIOTHERAPY FOR THYROID CARCINOMA      acetaminophen (TYLENOL EXTRA STRENGTH) 500 mg tablet Take 1,000 mg by mouth daily.  multivitamin (ONE A DAY) tablet Take 1 Tab by mouth daily.  OMALIZUMAB (XOLAIR SC) by SubCUTAneous route every thirty (30) days.  cranberry extract 450 mg tab Take 450 mg by mouth nightly.  gabapentin (NEURONTIN) 300 mg capsule Take 600 mg by mouth two (2) times a day.  zolpidem (AMBIEN) 10 mg tablet Take 10 mg by mouth nightly as needed for Sleep.  cyclobenzaprine (FLEXERIL) 10 mg tablet Take 10 mg by mouth daily.  dimethyl fumarate 240 mg cpDR Take 240 mg by mouth two (2) times a day.  baclofen (LIORESAL) 10 mg tablet Take 10 mg by mouth two (2) times a day. Indications: MS      telmisartan (MICARDIS) 40 mg tablet Take 40 mg by mouth daily. Indications: HYPERTENSION      cycloSPORINE (RESTASIS) 0.05 % ophthalmic emulsion Administer 1 Drop to both eyes two (2) times a day.  EPINEPHrine (EPIPEN) 0.3 mg/0.3 mL (1:1,000) injection 0.3 mg by IntraMUSCular route once as needed for Anaphylaxis (Iodine/Shellfish). Indications: ANAPHYLAXIS      DULoxetine (CYMBALTA) 60 mg capsule Take 60 mg by mouth nightly.  Indications: ANXIETY WITH DEPRESSION, NEUROPATHIC PAIN       Allergies   Allergen Reactions    Shellfish Derived Shortness of Breath and Swelling    Adhesive Tape-Silicones Other (comments)     Skin peels and blister    Sulfa (Sulfonamide Antibiotics) Itching        Review of Systems:     General - No history or complaints of unexpected fever, chills, or weight loss  Head/Neck - No history or complaints of headache, diplopia, dysphagia, hearing loss  Cardiac - No history or complaints of chest pain, palpitations, murmur, or shortness of breath  Pulmonary - No history or complaints of shortness of breath, productive cough, hemoptysis  Gastrointestinal - minimal reflux,no  abdominal pain, obstipation/constipation or blood per rectum  Genitourinary - No history or complaints of hematuria/dysuria, stress urinary incontinence symptoms, or renal lithiasis  Musculoskeletal - severe joint pain in their knees,  no muscular weakness  Hematologic - No history or complaints of bleeding disorders,  No blood transfusions  Neurologic - No history or complaints of  migraine headaches, seizure activity, syncopal episodes, TIA or stroke  Integumentary - No history or complaints of rashes, abnormal nevi, skin cancer  Gynecological - n/a    Objective:     Visit Vitals    /72 (BP 1 Location: Left arm, BP Patient Position: Sitting)    Pulse 96    Resp 16    Ht 5' 6.75\" (1.695 m)    Wt 117 kg (258 lb)    SpO2 100%    BMI 40.71 kg/m2     Physical Examination: General appearance - alert, well appearing, and in no distress and oriented to person, place, and time  Mental status - alert, oriented to person, place, and time, normal mood, behavior, speech, dress, motor activity, and thought processes  Eyes - pupils equal and reactive, extraocular eye movements intact, sclera anicteric, left eye normal, right eye normal  Ears - bilateral TM's and external ear canals normal, right ear normal, left ear normal  Nose - normal and patent, no erythema, discharge or polyps  Mouth - mucous membranes moist, pharynx normal without lesions  Neck - supple, no significant adenopathy  Lymphatics - no palpable lymphadenopathy, no hepatosplenomegaly  Chest - clear to auscultation, no wheezes, rales or rhonchi, symmetric air entry  Heart - normal rate, regular rhythm, normal S1, S2, no murmurs, rubs, clicks or gallops, occ. premature beat  Abdomen - soft, nontender, nondistended, no masses or organomegaly  Back exam - full range of motion, no tenderness, palpable spasm or pain on motion  Neurological - alert, oriented, normal speech, no focal findings or movement disorder noted  Musculoskeletal - no joint tenderness, deformity or swelling  Extremities - peripheral pulses normal, no pedal edema, no clubbing or cyanosis  Skin - normal coloration and turgor, no rashes, no suspicious skin lesions noted    Labs / Preoperative Evaluation:      No results found for this or any previous visit (from the past 1008 hour(s)). Assessment:     Morbid obesity with comorbidity    Plan:     laparoscopic sleeve gastrectomy    This is a 61 y.o. female with a BMI of Body mass index is 40.71 kg/(m^2). and the weight-related co-morbidties as noted above. Ibis Malagon meets the NIH criteria for bariatric surgery based upon the BMI of Body mass index is 40.71 kg/(m^2). and multiple weight-related co-morbidties. Ibis Malagon has elected laparoscopic sleeve gastrectomy as her intervention of choice for treatment of morbid obestiy through surgical means secondary to its safety profile, rapid return to work  and decreases in operative risks over gastric bypass. In the office today, following Fabiola's history and physical examination, a 40 minute discussion regarding the anatomic alterations for the laparoscopic sleeve gastrectomy was undertaken. The dietary expectations and the patient  dependent factors for success were thoroughly discussed, to include the need for interval follow-up and long-term dietary changes associated with success. The possible complications of the sleeve gastrectomy  were also discussed, to include;death, DVT/PE, staple line leak, bleeding, stricture formation, infection, nutritional deficiencies and sleeve dilation. Specific weight related outcomes for success were also discussed with an emphasis on careful and close follow-up with the first year and eating behavior modification as the baseline and cyclical hunger return. The patient expressed an understanding of the above factors, and her questions were answered in their entirety.     In addition, the patient attended a 1.5 hour power point seminar regarding obesity, surgical weight loss including, adjustable gastric band, gastric bypass, and sleeve gastrectomy. This discussion contrasted the different surgical techniques, mechanisms of actions and expected outcomes, and surgical and medical risks associated with each procedure. During this seminar, there was a long question and answer session where each questions was answered until there were no additional questions. Today, the patient had all of her questions answered and the decision was made today that the patient's preoperative evaluation is acceptable for them  to proceed with bariatric surgery  choosing the sleeve gastrectomy as her surgical option. The patient understands the plan of action    Since the patient's original consult 2.5 months ago they have been seen by their PCP as she was started on xolair for asthma. There has been no change to their overall medical or surgical history and they have been on no steroids in any form. Secondary Diagnoses:     DVT / Pulmonary Embolus Risk - The patient is at a higher risk for post operative DVT / pulmonary embolus secondary to their morbid obese status, relative sedentary lifestyle, and impending general anesthetic. We will plan to use anticoagulation therapy pre and post operative as well as  pneumatic compression devices and encourage ambulation once on the hospital nursing floor. The need for possible at home anticoagulation therapy has also been discussed and any decision on this matter will be made during post operative evaluations. The patient understands that their efforts at ambulation are of vital importance to reduce the risk of this complication thus placing significant burden on them as to the prevention of such issues. Signs and symptoms of DVT / PE have been discussed with the patient and they have been instructed to call the office if any these occur in the \"at home\" post op phase.     Adult Onset Diabetes - The patient has natalia given a very low carbohydrate diet preoperatively along with instructions to monitor their blood sugars on a regular daily basis. When  their surgery is performed  we will be monitoring the patient with sliding scale insulin and accuchecks.  Based on those values we will determine whether the patient needs a reduction of those medications postoperatively or total removal of those medications on discharge.  We will have the patient continue accuchecks postoperatively while at home also and report to me or their family physician for appropriate adjustments as needed.  The patient also understands that in the event of uncontrolled blood sugar preoperatively that we may choose to postpone their surgery.     Dietary Intervention  - The patient is currently scheduled to see or has been followed by a bariatric nutritionist for an attempt at preoperative weight loss as has been dictated by their insurance carrier. They will be assessed at various times during their follow up to evaluate their progress depending on the length of time that is required once again by their carrier. I have explained the importance of preoperative weight loss and the benefits regarding lower surgical risk and also assisting the patient in reaching their weight loss goal.  Finally they understand there is a physiologic benefit from the standpoint of hepatic volume reduction and reduction of central visceral adiposity preoperatively. I have reiterated the importance of a low carbohydrate and high protein regimen to achieve their stated goal. I have reviewed their current eating behavior prior to this encounter and explained to them in an exhaustive fashion the appropriate diet that they should adhere to.  They have been encouraged to loose weight pre operatively and understand it is our prerogative to cancel surgery or postpone their procedure in the event of significant weight gain.      GERD -The patient understands that weight loss surgery is not a guaranteed cure for reflux disease but does understand the benefits that weight loss can have on reflux disease.  They also understand that at the time of surgery the gastroesophageal junction will be evaluated for the presence of a diaphragmatic hernia.  Hernias will be corrected always with the gastric band and sleeve gastrectomy procedures, but only on a case by case basis with the gastric bypass if it prevents our ability to perform the operation at hand, or if I feel that they would benefit long term with correction of this issue.  The patient also understands that neither weight loss surgery nor repair of a diaphragmatic hernia repair guarantees the complete cessation of the disease. They also understand there is a possibility of recurrence with a simple crural repair as is performed with these procedures. They understand they may have to continue their medications in the postoperative period. They have a good understanding that the gastric bypass procedure is better suited to total resolution of this issue and that neither the Lap Band nor sleeve gastrectomy is considered a curative procedure as it pertains to this diagnosis.     Hypertension - The patient has a clear understanding of how weight loss improves hypertension as a whole, but also they understand that there is a significant genetic component to this disease process.  We will monitor the patients blood pressure while in the hospital and the plan would be to continue those medications postoperatively.  If a diuretic is being used we will stop them on discharge to prevent dehydration particularly with the sleeve gastrectomy and the gastric bypass procedures.  They will be instructed to monitor their blood pressure postoperatively while at home and notify their primary care physician in the event of any significantly high or uncharacteristic readings.     Weight Related Arthritis -The patient understands the benefits that weight loss surgery can have on their arthritis but also understands that weight loss is not a guaranteed cure and relief of symptoms is often dependent on the severity of the underlying disease.  The patient also understands that traditional pharmaceutical treatments for this diagnosis are usually unavailable to post-operative weight loss patients due to the effects on the gastrointestinal tract particularly with the gastric bypass and to a lesser effect with the sleeve gastrectomy.  Any changes to the patients medication treatment will ultimately be made the patients PCP with input by our office.     Hyperlipidemia - The patient understands that studies show that almost all patient will realize an improvement in their lipid profile with weight loss that occurs with these procedures. They however also understand that hyperlipidemia is a multifactorial disease particularly as it pertains to their genetic background and that there is no guarantee toward cure  of this issue.  We will resume their medications both pre operatively and immediately postoperatively as this tends to decrease any post operative cardiac events.  The patient will follow up with their family physician in the postoperative period with plans to repeat their lipid panel 2-3 month postoperative for potential adjustment or removal of these medications.     Multiple Sclerosis- Patient well controlled on meds and has let her Neurologist know she is having surgery.     Signed By: Ange Starks MD     August 28, 2017

## 2017-08-28 NOTE — PATIENT INSTRUCTIONS
Body Mass Index: Care Instructions  Your Care Instructions    Body mass index (BMI) can help you see if your weight is raising your risk for health problems. It uses a formula to compare how much you weigh with how tall you are. · A BMI lower than 18.5 is considered underweight. · A BMI between 18.5 and 24.9 is considered healthy. · A BMI between 25 and 29.9 is considered overweight. A BMI of 30 or higher is considered obese. If your BMI is in the normal range, it means that you have a lower risk for weight-related health problems. If your BMI is in the overweight or obese range, you may be at increased risk for weight-related health problems, such as high blood pressure, heart disease, stroke, arthritis or joint pain, and diabetes. If your BMI is in the underweight range, you may be at increased risk for health problems such as fatigue, lower protection (immunity) against illness, muscle loss, bone loss, hair loss, and hormone problems. BMI is just one measure of your risk for weight-related health problems. You may be at higher risk for health problems if you are not active, you eat an unhealthy diet, or you drink too much alcohol or use tobacco products. Follow-up care is a key part of your treatment and safety. Be sure to make and go to all appointments, and call your doctor if you are having problems. It's also a good idea to know your test results and keep a list of the medicines you take. How can you care for yourself at home? · Practice healthy eating habits. This includes eating plenty of fruits, vegetables, whole grains, lean protein, and low-fat dairy. · If your doctor recommends it, get more exercise. Walking is a good choice. Bit by bit, increase the amount you walk every day. Try for at least 30 minutes on most days of the week. · Do not smoke. Smoking can increase your risk for health problems. If you need help quitting, talk to your doctor about stop-smoking programs and medicines. These can increase your chances of quitting for good. · Limit alcohol to 2 drinks a day for men and 1 drink a day for women. Too much alcohol can cause health problems. If you have a BMI higher than 25  · Your doctor may do other tests to check your risk for weight-related health problems. This may include measuring the distance around your waist. A waist measurement of more than 40 inches in men or 35 inches in women can increase the risk of weight-related health problems. · Talk with your doctor about steps you can take to stay healthy or improve your health. You may need to make lifestyle changes to lose weight and stay healthy, such as changing your diet and getting regular exercise. If you have a BMI lower than 18.5  · Your doctor may do other tests to check your risk for health problems. · Talk with your doctor about steps you can take to stay healthy or improve your health. You may need to make lifestyle changes to gain or maintain weight and stay healthy, such as getting more healthy foods in your diet and doing exercises to build muscle. Where can you learn more? Go to http://astrid-jesusita.info/. Enter S176 in the search box to learn more about \"Body Mass Index: Care Instructions. \"  Current as of: January 23, 2017  Content Version: 11.3  © 1612-7832 [employer]. Care instructions adapted under license by [employer] (which disclaims liability or warranty for this information). If you have questions about a medical condition or this instruction, always ask your healthcare professional. [place] [address] any warranty or liability for your use of this information. Preparation for Surgery  Refer to your book for specific instructions    1. Stop taking all aspirin products, ibuprofen products, non-steroidal medications, blood thinners,  and herbal supplements as outlined in your book. 2. Absolutely no smoking.      3. If diabetic, monitor blood sugars regularly and alert the office of blood sugars over 200.    4. Have a supply of protein product and liquid diet items for your first two weeks as outlined in your book. 5. The day before your surgery is scheduled:  6.    Gastric Bypass and Sleeve:  Clear liquids and Protein Shakes     Gastric Band:   Eat lightly. No snacking.  Drink lots of water         6. Get prepared to meet a new you!

## 2017-09-06 ENCOUNTER — HOSPITAL ENCOUNTER (OUTPATIENT)
Dept: PREADMISSION TESTING | Age: 57
Discharge: HOME | End: 2017-09-06
Payer: MEDICARE

## 2017-09-06 LAB
ABO + RH BLD: NORMAL
ALBUMIN SERPL-MCNC: 3.6 G/DL (ref 3.4–5)
ALBUMIN/GLOB SERPL: 1 {RATIO} (ref 0.8–1.7)
ALP SERPL-CCNC: 67 U/L (ref 45–117)
ALT SERPL-CCNC: 27 U/L (ref 13–56)
ANION GAP SERPL CALC-SCNC: 12 MMOL/L (ref 3–18)
AST SERPL-CCNC: 15 U/L (ref 15–37)
ATRIAL RATE: 77 BPM
BASOPHILS # BLD: 0.1 K/UL (ref 0–0.06)
BASOPHILS NFR BLD: 1 % (ref 0–2)
BILIRUB SERPL-MCNC: 0.3 MG/DL (ref 0.2–1)
BLOOD GROUP ANTIBODIES SERPL: NORMAL
BUN SERPL-MCNC: 9 MG/DL (ref 7–18)
BUN/CREAT SERPL: 12 (ref 12–20)
CALCIUM SERPL-MCNC: 8.6 MG/DL (ref 8.5–10.1)
CALCULATED P AXIS, ECG09: 70 DEGREES
CALCULATED R AXIS, ECG10: 45 DEGREES
CALCULATED T AXIS, ECG11: 45 DEGREES
CHLORIDE SERPL-SCNC: 106 MMOL/L (ref 100–108)
CO2 SERPL-SCNC: 27 MMOL/L (ref 21–32)
CREAT SERPL-MCNC: 0.76 MG/DL (ref 0.6–1.3)
DIAGNOSIS, 93000: NORMAL
DIFFERENTIAL METHOD BLD: ABNORMAL
EOSINOPHIL # BLD: 0.4 K/UL (ref 0–0.4)
EOSINOPHIL NFR BLD: 8 % (ref 0–5)
ERYTHROCYTE [DISTWIDTH] IN BLOOD BY AUTOMATED COUNT: 17.1 % (ref 11.6–14.5)
GLOBULIN SER CALC-MCNC: 3.5 G/DL (ref 2–4)
GLUCOSE SERPL-MCNC: 104 MG/DL (ref 74–99)
HBA1C MFR BLD: 7.1 % (ref 4.5–5.6)
HCT VFR BLD AUTO: 33.3 % (ref 35–45)
HGB BLD-MCNC: 11.3 G/DL (ref 12–16)
LYMPHOCYTES # BLD: 1.6 K/UL (ref 0.9–3.6)
LYMPHOCYTES NFR BLD: 34 % (ref 21–52)
MCH RBC QN AUTO: 24.3 PG (ref 24–34)
MCHC RBC AUTO-ENTMCNC: 33.9 G/DL (ref 31–37)
MCV RBC AUTO: 71.6 FL (ref 74–97)
MONOCYTES # BLD: 0.3 K/UL (ref 0.05–1.2)
MONOCYTES NFR BLD: 7 % (ref 3–10)
NEUTS SEG # BLD: 2.4 K/UL (ref 1.8–8)
NEUTS SEG NFR BLD: 50 % (ref 40–73)
P-R INTERVAL, ECG05: 152 MS
PLATELET # BLD AUTO: 212 K/UL (ref 135–420)
PMV BLD AUTO: 10.9 FL (ref 9.2–11.8)
POTASSIUM SERPL-SCNC: 4 MMOL/L (ref 3.5–5.5)
PROT SERPL-MCNC: 7.1 G/DL (ref 6.4–8.2)
Q-T INTERVAL, ECG07: 390 MS
QRS DURATION, ECG06: 80 MS
QTC CALCULATION (BEZET), ECG08: 441 MS
RBC # BLD AUTO: 4.65 M/UL (ref 4.2–5.3)
SODIUM SERPL-SCNC: 145 MMOL/L (ref 136–145)
SPECIMEN EXP DATE BLD: NORMAL
VENTRICULAR RATE, ECG03: 77 BPM
WBC # BLD AUTO: 4.8 K/UL (ref 4.6–13.2)

## 2017-09-06 PROCEDURE — 85025 COMPLETE CBC W/AUTO DIFF WBC: CPT | Performed by: SPECIALIST

## 2017-09-06 PROCEDURE — 83036 HEMOGLOBIN GLYCOSYLATED A1C: CPT | Performed by: ANESTHESIOLOGY

## 2017-09-06 PROCEDURE — 93005 ELECTROCARDIOGRAM TRACING: CPT

## 2017-09-06 PROCEDURE — 36415 COLL VENOUS BLD VENIPUNCTURE: CPT | Performed by: SPECIALIST

## 2017-09-06 PROCEDURE — 80053 COMPREHEN METABOLIC PANEL: CPT | Performed by: SPECIALIST

## 2017-09-06 PROCEDURE — 86900 BLOOD TYPING SEROLOGIC ABO: CPT | Performed by: SPECIALIST

## 2017-09-12 ENCOUNTER — HOSPITAL ENCOUNTER (INPATIENT)
Age: 57
LOS: 1 days | Discharge: HOME | DRG: 621 | End: 2017-09-13
Attending: SPECIALIST | Admitting: SPECIALIST
Payer: MEDICARE

## 2017-09-12 ENCOUNTER — ANESTHESIA (OUTPATIENT)
Dept: SURGERY | Age: 57
DRG: 621 | End: 2017-09-12
Payer: MEDICARE

## 2017-09-12 LAB
ABO + RH BLD: NORMAL
BLOOD GROUP ANTIBODIES SERPL: NORMAL
GLUCOSE BLD STRIP.AUTO-MCNC: 131 MG/DL (ref 70–110)
GLUCOSE BLD STRIP.AUTO-MCNC: 93 MG/DL (ref 70–110)
SPECIMEN EXP DATE BLD: NORMAL

## 2017-09-12 PROCEDURE — 65270000029 HC RM PRIVATE

## 2017-09-12 PROCEDURE — 77030003580 HC NDL INSUF VERES J&J -B: Performed by: SPECIALIST

## 2017-09-12 PROCEDURE — 74011000250 HC RX REV CODE- 250: Performed by: SPECIALIST

## 2017-09-12 PROCEDURE — 77030018836 HC SOL IRR NACL ICUM -A: Performed by: SPECIALIST

## 2017-09-12 PROCEDURE — 74011250636 HC RX REV CODE- 250/636

## 2017-09-12 PROCEDURE — 88307 TISSUE EXAM BY PATHOLOGIST: CPT | Performed by: SPECIALIST

## 2017-09-12 PROCEDURE — 77030034154 HC SHR COAG HARM ACE J&J -F: Performed by: SPECIALIST

## 2017-09-12 PROCEDURE — 77030008683 HC TU ET CUF COVD -A: Performed by: ANESTHESIOLOGY

## 2017-09-12 PROCEDURE — 77030027876 HC STPLR ENDOSC FLX PWR J&J -G1: Performed by: SPECIALIST

## 2017-09-12 PROCEDURE — 74011250636 HC RX REV CODE- 250/636: Performed by: SPECIALIST

## 2017-09-12 PROCEDURE — 77030012893: Performed by: SPECIALIST

## 2017-09-12 PROCEDURE — 74011000250 HC RX REV CODE- 250

## 2017-09-12 PROCEDURE — 88313 SPECIAL STAINS GROUP 2: CPT | Performed by: SPECIALIST

## 2017-09-12 PROCEDURE — 0DB64Z3 EXCISION OF STOMACH, PERCUTANEOUS ENDOSCOPIC APPROACH, VERTICAL: ICD-10-PCS | Performed by: SPECIALIST

## 2017-09-12 PROCEDURE — 77030002912 HC SUT ETHBND J&J -A: Performed by: SPECIALIST

## 2017-09-12 PROCEDURE — 74011250637 HC RX REV CODE- 250/637: Performed by: SPECIALIST

## 2017-09-12 PROCEDURE — 77030033200 HC PRT CLSR CRTR THOMP COOP -C: Performed by: SPECIALIST

## 2017-09-12 PROCEDURE — 77030002916 HC SUT ETHLN J&J -A: Performed by: SPECIALIST

## 2017-09-12 PROCEDURE — 88342 IMHCHEM/IMCYTCHM 1ST ANTB: CPT | Performed by: SPECIALIST

## 2017-09-12 PROCEDURE — 77030002966 HC SUT PDS J&J -A: Performed by: SPECIALIST

## 2017-09-12 PROCEDURE — 77030034029 HC SLV GASTRCTMY CAL SYS DISP BOEH -C: Performed by: SPECIALIST

## 2017-09-12 PROCEDURE — 77030014008 HC SPNG HEMSTAT J&J -C: Performed by: SPECIALIST

## 2017-09-12 PROCEDURE — 76010000153 HC OR TIME 1.5 TO 2 HR: Performed by: SPECIALIST

## 2017-09-12 PROCEDURE — 77030008477 HC STYL SATN SLP COVD -A: Performed by: ANESTHESIOLOGY

## 2017-09-12 PROCEDURE — 77030008603 HC TRCR ENDOSC EPATH J&J -C: Performed by: SPECIALIST

## 2017-09-12 PROCEDURE — 36415 COLL VENOUS BLD VENIPUNCTURE: CPT | Performed by: SPECIALIST

## 2017-09-12 PROCEDURE — 86900 BLOOD TYPING SEROLOGIC ABO: CPT | Performed by: SPECIALIST

## 2017-09-12 PROCEDURE — 77030020255 HC SOL INJ LR 1000ML BG: Performed by: SPECIALIST

## 2017-09-12 PROCEDURE — 77030010515 HC APPL ENDOCLP LIG J&J -B: Performed by: SPECIALIST

## 2017-09-12 PROCEDURE — 77030009426 HC FCPS BIOP ENDOSC BSC -B: Performed by: SPECIALIST

## 2017-09-12 PROCEDURE — 0FB24ZX EXCISION OF LEFT LOBE LIVER, PERCUTANEOUS ENDOSCOPIC APPROACH, DIAGNOSTIC: ICD-10-PCS | Performed by: SPECIALIST

## 2017-09-12 PROCEDURE — 77030006643: Performed by: ANESTHESIOLOGY

## 2017-09-12 PROCEDURE — 76060000034 HC ANESTHESIA 1.5 TO 2 HR: Performed by: SPECIALIST

## 2017-09-12 PROCEDURE — 77030002935 HC SUT MCRYL J&J -C: Performed by: SPECIALIST

## 2017-09-12 PROCEDURE — 82962 GLUCOSE BLOOD TEST: CPT

## 2017-09-12 PROCEDURE — 0DB78ZX EXCISION OF STOMACH, PYLORUS, VIA NATURAL OR ARTIFICIAL OPENING ENDOSCOPIC, DIAGNOSTIC: ICD-10-PCS | Performed by: SPECIALIST

## 2017-09-12 PROCEDURE — 77030020782 HC GWN BAIR PAWS FLX 3M -B: Performed by: SPECIALIST

## 2017-09-12 PROCEDURE — 77030032490 HC SLV COMPR SCD KNE COVD -B: Performed by: SPECIALIST

## 2017-09-12 PROCEDURE — 77030013567 HC DRN WND RESERV BARD -A: Performed by: SPECIALIST

## 2017-09-12 PROCEDURE — 76210000006 HC OR PH I REC 0.5 TO 1 HR: Performed by: SPECIALIST

## 2017-09-12 PROCEDURE — 77030012407 HC DRN WND BARD -B: Performed by: SPECIALIST

## 2017-09-12 PROCEDURE — 77030022585 HC SEAL FBRN EVICEL J&J -F: Performed by: SPECIALIST

## 2017-09-12 PROCEDURE — 88305 TISSUE EXAM BY PATHOLOGIST: CPT | Performed by: SPECIALIST

## 2017-09-12 PROCEDURE — 77030036598 HC CARTDRG STPL RELD ECHELON FLX J&J -D: Performed by: SPECIALIST

## 2017-09-12 RX ORDER — ACETAMINOPHEN 10 MG/ML
1000 INJECTION, SOLUTION INTRAVENOUS ONCE
Status: COMPLETED | OUTPATIENT
Start: 2017-09-12 | End: 2017-09-12

## 2017-09-12 RX ORDER — HYDROMORPHONE HYDROCHLORIDE 1 MG/ML
1 INJECTION, SOLUTION INTRAMUSCULAR; INTRAVENOUS; SUBCUTANEOUS
Status: DISCONTINUED | OUTPATIENT
Start: 2017-09-12 | End: 2017-09-13

## 2017-09-12 RX ORDER — CEFAZOLIN SODIUM 2 G/50ML
2 SOLUTION INTRAVENOUS EVERY 8 HOURS
Status: COMPLETED | OUTPATIENT
Start: 2017-09-12 | End: 2017-09-13

## 2017-09-12 RX ORDER — BUPIVACAINE HYDROCHLORIDE 2.5 MG/ML
INJECTION, SOLUTION EPIDURAL; INFILTRATION; INTRACAUDAL AS NEEDED
Status: DISCONTINUED | OUTPATIENT
Start: 2017-09-12 | End: 2017-09-12 | Stop reason: HOSPADM

## 2017-09-12 RX ORDER — INSULIN LISPRO 100 [IU]/ML
INJECTION, SOLUTION INTRAVENOUS; SUBCUTANEOUS ONCE
Status: DISCONTINUED | OUTPATIENT
Start: 2017-09-12 | End: 2017-09-12 | Stop reason: HOSPADM

## 2017-09-12 RX ORDER — LIDOCAINE HYDROCHLORIDE 20 MG/ML
INJECTION, SOLUTION EPIDURAL; INFILTRATION; INTRACAUDAL; PERINEURAL AS NEEDED
Status: DISCONTINUED | OUTPATIENT
Start: 2017-09-12 | End: 2017-09-12 | Stop reason: HOSPADM

## 2017-09-12 RX ORDER — ENOXAPARIN SODIUM 100 MG/ML
40 INJECTION SUBCUTANEOUS EVERY 12 HOURS
Status: DISCONTINUED | OUTPATIENT
Start: 2017-09-12 | End: 2017-09-13 | Stop reason: HOSPADM

## 2017-09-12 RX ORDER — ENOXAPARIN SODIUM 100 MG/ML
40 INJECTION SUBCUTANEOUS ONCE
Status: COMPLETED | OUTPATIENT
Start: 2017-09-12 | End: 2017-09-12

## 2017-09-12 RX ORDER — HYDROMORPHONE HYDROCHLORIDE 2 MG/ML
INJECTION, SOLUTION INTRAMUSCULAR; INTRAVENOUS; SUBCUTANEOUS AS NEEDED
Status: DISCONTINUED | OUTPATIENT
Start: 2017-09-12 | End: 2017-09-12 | Stop reason: HOSPADM

## 2017-09-12 RX ORDER — MAGNESIUM SULFATE 100 %
4 CRYSTALS MISCELLANEOUS AS NEEDED
Status: DISCONTINUED | OUTPATIENT
Start: 2017-09-12 | End: 2017-09-12 | Stop reason: HOSPADM

## 2017-09-12 RX ORDER — NEOSTIGMINE METHYLSULFATE 5 MG/5 ML
SYRINGE (ML) INTRAVENOUS AS NEEDED
Status: DISCONTINUED | OUTPATIENT
Start: 2017-09-12 | End: 2017-09-12 | Stop reason: HOSPADM

## 2017-09-12 RX ORDER — METOCLOPRAMIDE HYDROCHLORIDE 5 MG/ML
INJECTION INTRAMUSCULAR; INTRAVENOUS AS NEEDED
Status: DISCONTINUED | OUTPATIENT
Start: 2017-09-12 | End: 2017-09-12 | Stop reason: HOSPADM

## 2017-09-12 RX ORDER — KETOROLAC TROMETHAMINE 30 MG/ML
INJECTION, SOLUTION INTRAMUSCULAR; INTRAVENOUS AS NEEDED
Status: DISCONTINUED | OUTPATIENT
Start: 2017-09-12 | End: 2017-09-12 | Stop reason: HOSPADM

## 2017-09-12 RX ORDER — ONDANSETRON 2 MG/ML
4 INJECTION INTRAMUSCULAR; INTRAVENOUS
Status: DISCONTINUED | OUTPATIENT
Start: 2017-09-12 | End: 2017-09-13 | Stop reason: HOSPADM

## 2017-09-12 RX ORDER — FLUMAZENIL 0.1 MG/ML
0.2 INJECTION INTRAVENOUS
Status: DISCONTINUED | OUTPATIENT
Start: 2017-09-12 | End: 2017-09-12 | Stop reason: HOSPADM

## 2017-09-12 RX ORDER — DIPHENHYDRAMINE HYDROCHLORIDE 50 MG/ML
12.5 INJECTION, SOLUTION INTRAMUSCULAR; INTRAVENOUS
Status: DISCONTINUED | OUTPATIENT
Start: 2017-09-12 | End: 2017-09-12 | Stop reason: HOSPADM

## 2017-09-12 RX ORDER — GLYCOPYRROLATE 0.2 MG/ML
INJECTION INTRAMUSCULAR; INTRAVENOUS AS NEEDED
Status: DISCONTINUED | OUTPATIENT
Start: 2017-09-12 | End: 2017-09-12 | Stop reason: HOSPADM

## 2017-09-12 RX ORDER — FAMOTIDINE 10 MG/ML
20 INJECTION INTRAVENOUS ONCE
Status: COMPLETED | OUTPATIENT
Start: 2017-09-12 | End: 2017-09-12

## 2017-09-12 RX ORDER — SUCCINYLCHOLINE CHLORIDE 20 MG/ML
INJECTION INTRAMUSCULAR; INTRAVENOUS AS NEEDED
Status: DISCONTINUED | OUTPATIENT
Start: 2017-09-12 | End: 2017-09-12 | Stop reason: HOSPADM

## 2017-09-12 RX ORDER — HYDROMORPHONE HYDROCHLORIDE 1 MG/ML
0.5 INJECTION, SOLUTION INTRAMUSCULAR; INTRAVENOUS; SUBCUTANEOUS
Status: DISCONTINUED | OUTPATIENT
Start: 2017-09-12 | End: 2017-09-12 | Stop reason: HOSPADM

## 2017-09-12 RX ORDER — PROPOFOL 10 MG/ML
INJECTION, EMULSION INTRAVENOUS AS NEEDED
Status: DISCONTINUED | OUTPATIENT
Start: 2017-09-12 | End: 2017-09-12 | Stop reason: HOSPADM

## 2017-09-12 RX ORDER — CEFAZOLIN SODIUM 2 G/50ML
2 SOLUTION INTRAVENOUS ONCE
Status: COMPLETED | OUTPATIENT
Start: 2017-09-12 | End: 2017-09-12

## 2017-09-12 RX ORDER — DEXTROSE 50 % IN WATER (D50W) INTRAVENOUS SYRINGE
25-50 AS NEEDED
Status: DISCONTINUED | OUTPATIENT
Start: 2017-09-12 | End: 2017-09-12 | Stop reason: HOSPADM

## 2017-09-12 RX ORDER — NALOXONE HYDROCHLORIDE 0.4 MG/ML
0.2 INJECTION, SOLUTION INTRAMUSCULAR; INTRAVENOUS; SUBCUTANEOUS AS NEEDED
Status: DISCONTINUED | OUTPATIENT
Start: 2017-09-12 | End: 2017-09-12 | Stop reason: HOSPADM

## 2017-09-12 RX ORDER — NYSTATIN 100000 [USP'U]/ML
500000 SUSPENSION ORAL
Status: COMPLETED | OUTPATIENT
Start: 2017-09-12 | End: 2017-09-12

## 2017-09-12 RX ORDER — SODIUM CHLORIDE 0.9 % (FLUSH) 0.9 %
5-10 SYRINGE (ML) INJECTION AS NEEDED
Status: DISCONTINUED | OUTPATIENT
Start: 2017-09-12 | End: 2017-09-12 | Stop reason: HOSPADM

## 2017-09-12 RX ORDER — SODIUM CHLORIDE, SODIUM LACTATE, POTASSIUM CHLORIDE, CALCIUM CHLORIDE 600; 310; 30; 20 MG/100ML; MG/100ML; MG/100ML; MG/100ML
125 INJECTION, SOLUTION INTRAVENOUS CONTINUOUS
Status: DISCONTINUED | OUTPATIENT
Start: 2017-09-12 | End: 2017-09-12

## 2017-09-12 RX ORDER — FENTANYL CITRATE 50 UG/ML
INJECTION, SOLUTION INTRAMUSCULAR; INTRAVENOUS AS NEEDED
Status: DISCONTINUED | OUTPATIENT
Start: 2017-09-12 | End: 2017-09-12 | Stop reason: HOSPADM

## 2017-09-12 RX ORDER — FENTANYL CITRATE 50 UG/ML
25 INJECTION, SOLUTION INTRAMUSCULAR; INTRAVENOUS AS NEEDED
Status: DISCONTINUED | OUTPATIENT
Start: 2017-09-12 | End: 2017-09-12 | Stop reason: HOSPADM

## 2017-09-12 RX ORDER — ALBUTEROL SULFATE 0.83 MG/ML
2.5 SOLUTION RESPIRATORY (INHALATION) AS NEEDED
Status: DISCONTINUED | OUTPATIENT
Start: 2017-09-12 | End: 2017-09-12 | Stop reason: HOSPADM

## 2017-09-12 RX ORDER — SODIUM CHLORIDE, SODIUM LACTATE, POTASSIUM CHLORIDE, CALCIUM CHLORIDE 600; 310; 30; 20 MG/100ML; MG/100ML; MG/100ML; MG/100ML
150 INJECTION, SOLUTION INTRAVENOUS CONTINUOUS
Status: DISCONTINUED | OUTPATIENT
Start: 2017-09-12 | End: 2017-09-13 | Stop reason: HOSPADM

## 2017-09-12 RX ORDER — DIPHENHYDRAMINE HYDROCHLORIDE 50 MG/ML
25 INJECTION, SOLUTION INTRAMUSCULAR; INTRAVENOUS
Status: DISCONTINUED | OUTPATIENT
Start: 2017-09-12 | End: 2017-09-13 | Stop reason: HOSPADM

## 2017-09-12 RX ORDER — ROCURONIUM BROMIDE 10 MG/ML
INJECTION, SOLUTION INTRAVENOUS AS NEEDED
Status: DISCONTINUED | OUTPATIENT
Start: 2017-09-12 | End: 2017-09-12 | Stop reason: HOSPADM

## 2017-09-12 RX ORDER — ONDANSETRON 2 MG/ML
INJECTION INTRAMUSCULAR; INTRAVENOUS AS NEEDED
Status: DISCONTINUED | OUTPATIENT
Start: 2017-09-12 | End: 2017-09-12 | Stop reason: HOSPADM

## 2017-09-12 RX ORDER — HYDROMORPHONE HYDROCHLORIDE 1 MG/ML
0.5 INJECTION, SOLUTION INTRAMUSCULAR; INTRAVENOUS; SUBCUTANEOUS
Status: DISCONTINUED | OUTPATIENT
Start: 2017-09-12 | End: 2017-09-13

## 2017-09-12 RX ORDER — LABETALOL HYDROCHLORIDE 5 MG/ML
INJECTION, SOLUTION INTRAVENOUS AS NEEDED
Status: DISCONTINUED | OUTPATIENT
Start: 2017-09-12 | End: 2017-09-12 | Stop reason: HOSPADM

## 2017-09-12 RX ORDER — MIDAZOLAM HYDROCHLORIDE 1 MG/ML
INJECTION, SOLUTION INTRAMUSCULAR; INTRAVENOUS AS NEEDED
Status: DISCONTINUED | OUTPATIENT
Start: 2017-09-12 | End: 2017-09-12 | Stop reason: HOSPADM

## 2017-09-12 RX ORDER — ACETAMINOPHEN 10 MG/ML
1000 INJECTION, SOLUTION INTRAVENOUS EVERY 6 HOURS
Status: COMPLETED | OUTPATIENT
Start: 2017-09-12 | End: 2017-09-13

## 2017-09-12 RX ORDER — SODIUM CHLORIDE, SODIUM LACTATE, POTASSIUM CHLORIDE, CALCIUM CHLORIDE 600; 310; 30; 20 MG/100ML; MG/100ML; MG/100ML; MG/100ML
1000 INJECTION, SOLUTION INTRAVENOUS CONTINUOUS
Status: DISCONTINUED | OUTPATIENT
Start: 2017-09-12 | End: 2017-09-12 | Stop reason: HOSPADM

## 2017-09-12 RX ADMIN — SUCCINYLCHOLINE CHLORIDE 120 MG: 20 INJECTION INTRAMUSCULAR; INTRAVENOUS at 12:49

## 2017-09-12 RX ADMIN — HYDROMORPHONE HYDROCHLORIDE 1 MG: 2 INJECTION, SOLUTION INTRAMUSCULAR; INTRAVENOUS; SUBCUTANEOUS at 13:46

## 2017-09-12 RX ADMIN — SODIUM CHLORIDE, SODIUM LACTATE, POTASSIUM CHLORIDE, AND CALCIUM CHLORIDE 150 ML/HR: 600; 310; 30; 20 INJECTION, SOLUTION INTRAVENOUS at 23:03

## 2017-09-12 RX ADMIN — ONDANSETRON 4 MG: 2 INJECTION INTRAMUSCULAR; INTRAVENOUS at 13:57

## 2017-09-12 RX ADMIN — SODIUM CHLORIDE, SODIUM LACTATE, POTASSIUM CHLORIDE, AND CALCIUM CHLORIDE: 600; 310; 30; 20 INJECTION, SOLUTION INTRAVENOUS at 13:51

## 2017-09-12 RX ADMIN — ACETAMINOPHEN 1000 MG: 10 INJECTION, SOLUTION INTRAVENOUS at 12:59

## 2017-09-12 RX ADMIN — ROCURONIUM BROMIDE 50 MG: 10 INJECTION, SOLUTION INTRAVENOUS at 12:59

## 2017-09-12 RX ADMIN — ACETAMINOPHEN 1000 MG: 10 INJECTION, SOLUTION INTRAVENOUS at 19:10

## 2017-09-12 RX ADMIN — ENOXAPARIN SODIUM 40 MG: 40 INJECTION SUBCUTANEOUS at 23:01

## 2017-09-12 RX ADMIN — FENTANYL CITRATE 100 MCG: 50 INJECTION, SOLUTION INTRAMUSCULAR; INTRAVENOUS at 12:55

## 2017-09-12 RX ADMIN — HYDROMORPHONE HYDROCHLORIDE 0.5 MG: 1 INJECTION, SOLUTION INTRAMUSCULAR; INTRAVENOUS; SUBCUTANEOUS at 16:56

## 2017-09-12 RX ADMIN — KETOROLAC TROMETHAMINE 15 MG: 30 INJECTION, SOLUTION INTRAMUSCULAR; INTRAVENOUS at 13:57

## 2017-09-12 RX ADMIN — Medication 3 MG: at 14:11

## 2017-09-12 RX ADMIN — CEFAZOLIN SODIUM 2 G: 2 SOLUTION INTRAVENOUS at 13:00

## 2017-09-12 RX ADMIN — HYDROMORPHONE HYDROCHLORIDE 1 MG: 1 INJECTION, SOLUTION INTRAMUSCULAR; INTRAVENOUS; SUBCUTANEOUS at 19:10

## 2017-09-12 RX ADMIN — SODIUM CHLORIDE, SODIUM LACTATE, POTASSIUM CHLORIDE, AND CALCIUM CHLORIDE 125 ML/HR: 600; 310; 30; 20 INJECTION, SOLUTION INTRAVENOUS at 11:08

## 2017-09-12 RX ADMIN — PROPOFOL 20 MG: 10 INJECTION, EMULSION INTRAVENOUS at 12:55

## 2017-09-12 RX ADMIN — MIDAZOLAM HYDROCHLORIDE 2 MG: 1 INJECTION, SOLUTION INTRAMUSCULAR; INTRAVENOUS at 12:43

## 2017-09-12 RX ADMIN — ENOXAPARIN SODIUM 40 MG: 40 INJECTION SUBCUTANEOUS at 11:10

## 2017-09-12 RX ADMIN — FENTANYL CITRATE 50 MCG: 50 INJECTION, SOLUTION INTRAMUSCULAR; INTRAVENOUS at 12:59

## 2017-09-12 RX ADMIN — METOCLOPRAMIDE HYDROCHLORIDE 10 MG: 5 INJECTION INTRAMUSCULAR; INTRAVENOUS at 13:57

## 2017-09-12 RX ADMIN — SODIUM CHLORIDE, SODIUM LACTATE, POTASSIUM CHLORIDE, AND CALCIUM CHLORIDE: 600; 310; 30; 20 INJECTION, SOLUTION INTRAVENOUS at 13:16

## 2017-09-12 RX ADMIN — ONDANSETRON 4 MG: 2 INJECTION INTRAMUSCULAR; INTRAVENOUS at 19:11

## 2017-09-12 RX ADMIN — LABETALOL HYDROCHLORIDE 10 MG: 5 INJECTION, SOLUTION INTRAVENOUS at 14:16

## 2017-09-12 RX ADMIN — PROPOFOL 180 MG: 10 INJECTION, EMULSION INTRAVENOUS at 12:49

## 2017-09-12 RX ADMIN — FENTANYL CITRATE 100 MCG: 50 INJECTION, SOLUTION INTRAMUSCULAR; INTRAVENOUS at 12:43

## 2017-09-12 RX ADMIN — SODIUM CHLORIDE, SODIUM LACTATE, POTASSIUM CHLORIDE, AND CALCIUM CHLORIDE 150 ML/HR: 600; 310; 30; 20 INJECTION, SOLUTION INTRAVENOUS at 16:58

## 2017-09-12 RX ADMIN — FAMOTIDINE 20 MG: 10 INJECTION, SOLUTION INTRAVENOUS at 11:11

## 2017-09-12 RX ADMIN — LIDOCAINE HYDROCHLORIDE 80 MG: 20 INJECTION, SOLUTION EPIDURAL; INFILTRATION; INTRACAUDAL; PERINEURAL at 12:49

## 2017-09-12 RX ADMIN — CEFAZOLIN SODIUM 2 G: 2 SOLUTION INTRAVENOUS at 21:02

## 2017-09-12 RX ADMIN — GLYCOPYRROLATE 0.4 MG: 0.2 INJECTION INTRAMUSCULAR; INTRAVENOUS at 14:11

## 2017-09-12 RX ADMIN — NYSTATIN 500000 UNITS: 100000 SUSPENSION ORAL at 11:09

## 2017-09-12 NOTE — ANESTHESIA PREPROCEDURE EVALUATION
Anesthetic History     Increased risk of difficult airway          Review of Systems / Medical History  Patient summary reviewed, nursing notes reviewed and pertinent labs reviewed    Pulmonary            Asthma : well controlled       Neuro/Psych         Psychiatric history     Cardiovascular    Hypertension              Exercise tolerance: >4 METS     GI/Hepatic/Renal     GERD: well controlled           Endo/Other    Diabetes  Hyperthyroidism  Morbid obesity and arthritis     Other Findings              Physical Exam    Airway  Mallampati: IV  TM Distance: 4 - 6 cm  Neck ROM: normal range of motion   Mouth opening: Normal     Cardiovascular  Regular rate and rhythm,  S1 and S2 normal,  no murmur, click, rub, or gallop             Dental  No notable dental hx       Pulmonary  Breath sounds clear to auscultation               Abdominal  GI exam deferred       Other Findings            Anesthetic Plan    ASA: 3  Anesthesia type: general          Induction: Intravenous  Anesthetic plan and risks discussed with: Patient

## 2017-09-12 NOTE — PROGRESS NOTES
Verbally informed Dr. Kinjal Jane of pts BP at this time. No new orders given. Dr. Kinjal Jane states that pts diastolic BP is low and no interventions are needed.

## 2017-09-12 NOTE — ANESTHESIA POSTPROCEDURE EVALUATION
Post-Anesthesia Evaluation & Assessment    Visit Vitals    /52    Pulse 80    Temp 36.8 °C (98.3 °F)    Resp 13    Ht 5' 3.75\" (1.619 m)    Wt 116.3 kg (256 lb 7 oz)    SpO2 95%    BMI 44.36 kg/m2       No untreated/active PONV    Post-operative hydration adequate. Adequate post-operative analgesia per PACU discharge criteria    Mental status & level of consciousness: alert and oriented x 3    Respiratory status: patent unassisted airway     No apparent anesthetic complications requiring additional post-anesthetic care    Patient has met all discharge requirements.             Carolina Payne MD

## 2017-09-12 NOTE — PROGRESS NOTES
Pt admitted for an elective surgical procedure. Pt is independent. Please encourage ambulation. No plan of care needs identified. Anticipate pt will be medically stable for discharge within the next 24-48 hours. CM available to assist as needed. Readmission Risk Assessment:     Moderate Risk and MSSP/Good Help ACO patients    RRAT Score:  13-20    Initial Assessment: physician follow up      Emergency Contact:  spouse    Pertinent Medical Hx:     See clinical     PCP/Specialists:  Yoanna      Community Services:       DME:          Moderate Risk Care Transition Plan:  1. Evaluate for [place] or H2H, SNF, acute rehab, community care coordination of resources. 2. Involve patient/caregiver in assessment, planning, education and implement of intervention. 3. CM daily patient care huddles/interdisciplinary rounds. 4. PCP/Specialist appointment within 5 - 7 days made prior to discharge. 5. Facilitate transportation and logistics for follow-up appointments. 6. Medication reconciliation - Pharmacy  7. Formal handoff between hospital provider and post-acute provider to transition patient  Handoff to [facility] Nurse Navigator or PCP practice. Care Management Interventions  PCP Verified by CM: Yes  Mode of Transport at Discharge:  Other (see comment) (Spouse)  Transition of Care Consult (CM Consult): Discharge Planning  Health Maintenance Reviewed: Yes  Current Support Network: Lives with Spouse  Confirm Follow Up Transport: Self  Discharge Location  Discharge Placement: Home

## 2017-09-12 NOTE — PERIOP NOTES
TRANSFER - OUT REPORT:    Verbal report given to Edmar Recinos RN (name) on Elodia Monroy  being transferred to [address] [place] [address](unit) for routine post - op       Report consisted of patients Situation, Background, Assessment and   Recommendations(SBAR). Information from the following report(s) SBAR, Intake/Output and MAR was reviewed with the receiving nurse. Lines:   Peripheral IV 09/12/17 Right Hand (Active)   Site Assessment Clean, dry, & intact 9/12/2017  3:09 PM   Phlebitis Assessment 0 9/12/2017  3:09 PM   Infiltration Assessment 0 9/12/2017  3:09 PM   Dressing Status Clean, dry, & intact 9/12/2017  3:09 PM   Dressing Type Transparent;Tape 9/12/2017  3:09 PM   Hub Color/Line Status Green; Infusing 9/12/2017  3:09 PM        Opportunity for questions and clarification was provided.       Patient transported with:   O2 @ 2 liters  Registered Nurse  [facility]

## 2017-09-12 NOTE — OP NOTES
OPERATIVE REPORT         Arleth Stauffer   : 1960  Medical Record GRNAGE:[id number]    Pre-operative Diagnosis:  HYPERTENSION,MORBID OBESITY,GERD,OSTEOARTHRITIS BILATERAL KNEES,BMI 41,FATTY LIVER  Post-operative Diagnosis: HYPERTENSION,MORBID OBESITY,GERD,OSTEOARTHRITIS BILATERAL KNEES,BMI 41,FATTY LIVER  Procedure: Procedure(s):  LAPAROSCOPIC GASTRIC SLEEVE  WEDGE LIVER BIOPSY  INTRAOPERATIVE ENDOSCOPY WITH BIOPSY  [facility]: [facility]  Surgeon: Yazmin Clark MD  Assistant:  Winsome Rocha [facility]    Anesthesia: General       Specimens: 1. Gastric Sleeve Resection                       2. Liver Wedge Biopsy    EBL: less than 20 cc  Additional Findings: none             STATEMENT OF MEDICAL NECESSITY: The patient is a 61y.o.-year-old female who has had a history of obesity. she has failed conservative weight loss measures,   such began to consider weight loss surgical options. she chose the   sleeve gastrectomy as a means of surgical weight control. she has undergone   nutritional and psychological teaching at this time period and does wish to proceed   with sleeve gastrectomy. OPERATIVE PROCEDURE: The patient was brought to the operating room, placed   on the table in supine position at which time general  anesthesia was administered   without any difficulty. The abdomen was then prepped and draped in the   usual sterile fashion. Using a 15 blade, a 1 cm incision was made just to the   left of the umbilicus. The veress needle approach was used to gain access to   the peritoneal cavity which was then insufflated. The Visi-Port was then placed   at that site,then 4 additional trocars were placed in the usual U-shaped   configuration with a subxiphoid incision being made to accommodate the   [facility] retractor. On entering the abdomen, the patient was noted to have a   moderate fatty liver with possible evidence of early steatohepatitis.  I elevated the liver and noted the patient had no diaphragmatic hernia present. I began the operation by choosing an area 2-3 cm proximal to the pylorus and within the gastroepiploic vessels I began to divide off these vessels individually. I moved cephalad toward short gastric vessels, which were very difficult to take down due to the proximity to the splenic hilum. I was able to do so, clearing the entire left crural area. I then placed a Visigi tube, impacting at the distal antrum. I then began the resection with the powered [place]   stapler using the green loads for the first firing tangential along the   antral region. The second and subsequent firings were used with blue  reloads  reaching just past the incisura region. The remainder of the 5 vertical   firings completed the resection at the left crural region. I then tested   the pouch via the Visigi tube using dilute methylene blue,it was noted to be completely   Watertight. I then left the operative field and proceeded to the the head of the bed and performed an intraoperative EGD. The scope was passed successfully into the gastric sleeve to the level of the pylorus. Biopsies were taken of this region and submitted to test both for H Pylori and for pathologic diagnosis. There was no bleeding noted and no leak appreciated with air insufflation. I then returned to the surgical field. I then obtained hemostasis along the staple line using   Hemoclips and sutures where needed. I then used 3 separate 2-0 Ethibond sutures to secure the lateral aspect of the newly created sleeve stomach to the resected edge of the gastrocolic omentum in an effort to maintain the continuity of the sleeve and prevent twisting. I then used Eviseal along the entire staple line to obtain hemostasis and then place Surgicel Snow over the staple line also. With all this having been completed, I then removed the liver retractor.  I performed a liver wedge biopsy of the left lobe of the liver due to its abnormality and submitted to pathology for permanent section. I then placed a NICOLASA drain in the left upper quadrant region along the staple line. I removed the specimen from the operative field via the LLQ incision. I closed the left lower quadrant trocar site using a transabdominal #0 PDS suture along the fascia, and all skin incisions were then closed using 4-0 subcuticular Monocryl. Steri-Strips and sterile dressings were applied. The patient tolerated the procedure well.        Perry Dumont M.D.

## 2017-09-12 NOTE — H&P (VIEW-ONLY)
Sleeve Gastrectomy - History and Physical    Subjective: The patient is a 61 y.o. obese female with a Body mass index is 40.71 kg/(m^2). .   she presents now to review their work   up to date to see if they are a candidate for surgery and whether or not to proceed with the previously requested procedure. Bariatric comorbidities continue to include:   Patient Active Problem List   Diagnosis Code    Osteoarthritis of left knee M17.12    Morbid obesity with BMI of 40.0-44.9, adult ([address] E66.01, Z68.41    Osteoarthritis of right knee M17.11    Borderline diabetes R73.03    Thyroid cancer ([facility] [address] C73    Rheumatoid arthritis ([facility] [address] M06.9    Morbid obesity ([facility] [address] E66.01    Hypertension I10    GERD (gastroesophageal reflux disease) K21.9    Asthma J45.909    Adverse effect of anesthesia T41.45XA      They have been generally well prior to this visit and have had no recent significant illnesses. The patient has had no gastrointestinal   issues that would preclude them from proceeding with the surgery they have chosen. Jaylen Key has recently tried a preoperative   weight loss program  in addition to seeing a bariatric nutritionist preoperatively. We have discussed on at least one other occasion   about the various types of surgical weight loss procedures and they have considered these options after our initial consultation. We have once again discussed these procedures in detail and they have now decided on a surgical procedure. They present   today to discuss this and confirm that their evaluation pre operatively is acceptable to continue with surgery. The patient desires laparoscopic sleeve gastrectomy for surgical weight loss. The patients goal weight is 172 lb. (this represents a BMI of 27)    These goals are consistent with expected outcomes of their desired operation. her Medical goals are resolution of these health issues.     Patient Active Problem List    Diagnosis Date Noted    Morbid obesity with BMI of 40.0-44.9, adult ([address]     Osteoarthritis of right knee     Borderline diabetes     Thyroid cancer ([address]     Rheumatoid arthritis ([address]     Morbid obesity ([address]     Hypertension     GERD (gastroesophageal reflux disease)     Asthma     Adverse effect of anesthesia     Osteoarthritis of left knee 2016     Past Surgical History:   Procedure Laterality Date    HX  SECTION   &     HX HERNIA REPAIR      umbilical x2    HX HYSTERECTOMY  2004    [facility]    HX [facility]ST[facility]Y      -  [facility]    HX ORTHOPAEDIC Bilateral     partial knee replacement    HX OTHER SURGICAL      fatty tumor removal left arm    [facility]X [facility]      -  [facility]    HX TONSILLECTOMY        Social History   Substance Use Topics    Smoking status: Former Smoker     Quit date: 3/9/2002    Smokeless tobacco: Never Used    Alcohol use Yes      Comment: 2 alcohol beverages per month      History reviewed. No pertinent family history. Current Outpatient Prescriptions   Medication Sig Dispense Refill    oxyCODONE-acetaminophen (PERCOCET) 5-325 mg per tablet Take 1 Tab by mouth every four (4) hours as needed for Pain. Max Daily Amount: 6 Tabs. 30 Tab 0    celecoxib (CELEBREX) 200 mg capsule Take 200 mg by mouth daily.  DOCOSAHEXANOIC ACID/EPA (FISH OIL PO) Take 1,000 mg by mouth daily.  ibuprofen (MOTRIN) 800 mg tablet Take 800 mg by mouth two (2) times a day.  famotidine (PEPCID AC) 20 mg tablet Take 20 mg by mouth nightly. Indications: gastroesophageal reflux disease      cetirizine (ZYRTEC) 10 mg tablet Take 10 mg by mouth daily.  predniSONE (DELTASONE) 5 mg tablet Take 5 mg by mouth as needed.  flaxseed oil 1,000 mg cap Take  by mouth daily.  black cohosh 540 mg cap Take  by mouth daily.  Biotin 2,500 mcg cap Take 5,000 mcg by mouth daily.       levothyroxine (SYNTHROID) 112 mcg tablet Take 112 mcg by mouth Daily (before breakfast). Indications: ADJUNCT TO SURGERY OR RADIOTHERAPY FOR THYROID CARCINOMA      acetaminophen (TYLENOL EXTRA STRENGTH) 500 mg tablet Take 1,000 mg by mouth daily.  multivitamin (ONE A DAY) tablet Take 1 Tab by mouth daily.  OMALIZUMAB (XOLAIR SC) by SubCUTAneous route every thirty (30) days.  cranberry extract 450 mg tab Take 450 mg by mouth nightly.  gabapentin (NEURONTIN) 300 mg capsule Take 600 mg by mouth two (2) times a day.  zolpidem (AMBIEN) 10 mg tablet Take 10 mg by mouth nightly as needed for Sleep.  cyclobenzaprine (FLEXERIL) 10 mg tablet Take 10 mg by mouth daily.  dimethyl fumarate 240 mg cpDR Take 240 mg by mouth two (2) times a day.  baclofen (LIORESAL) 10 mg tablet Take 10 mg by mouth two (2) times a day. Indications: MS      telmisartan (MICARDIS) 40 mg tablet Take 40 mg by mouth daily. Indications: HYPERTENSION      cycloSPORINE (RESTASIS) 0.05 % ophthalmic emulsion Administer 1 Drop to both eyes two (2) times a day.  EPINEPHrine (EPIPEN) 0.3 mg/0.3 mL (1:1,000) injection 0.3 mg by IntraMUSCular route once as needed for Anaphylaxis (Iodine/Shellfish). Indications: ANAPHYLAXIS      DULoxetine (CYMBALTA) 60 mg capsule Take 60 mg by mouth nightly.  Indications: ANXIETY WITH DEPRESSION, NEUROPATHIC PAIN       Allergies   Allergen Reactions    Shellfish Derived Shortness of Breath and Swelling    Adhesive Tape-Silicones Other (comments)     Skin peels and blister    Sulfa (Sulfonamide Antibiotics) Itching        Review of Systems:     General - No history or complaints of unexpected fever, chills, or weight loss  Head/Neck - No history or complaints of headache, diplopia, dysphagia, hearing loss  Cardiac - No history or complaints of chest pain, palpitations, murmur, or shortness of breath  Pulmonary - No history or complaints of shortness of breath, productive cough, hemoptysis  Gastrointestinal - minimal reflux,no  abdominal pain, obstipation/constipation or blood per rectum  Genitourinary - No history or complaints of hematuria/dysuria, stress urinary incontinence symptoms, or renal lithiasis  Musculoskeletal - severe joint pain in their knees,  no muscular weakness  Hematologic - No history or complaints of bleeding disorders,  No blood transfusions  Neurologic - No history or complaints of  migraine headaches, seizure activity, syncopal episodes, TIA or stroke  Integumentary - No history or complaints of rashes, abnormal nevi, skin cancer  Gynecological - n/a    Objective:     Visit Vitals    /72 (BP 1 Location: Left arm, BP Patient Position: Sitting)    Pulse 96    Resp 16    Ht 5' 6.75\" (1.695 m)    Wt 117 kg (258 lb)    SpO2 100%    BMI 40.71 kg/m2     Physical Examination: General appearance - alert, well appearing, and in no distress and oriented to person, place, and time  Mental status - alert, oriented to person, place, and time, normal mood, behavior, speech, dress, motor activity, and thought processes  Eyes - pupils equal and reactive, extraocular eye movements intact, sclera anicteric, left eye normal, right eye normal  Ears - bilateral TM's and external ear canals normal, right ear normal, left ear normal  Nose - normal and patent, no erythema, discharge or polyps  Mouth - mucous membranes moist, pharynx normal without lesions  Neck - supple, no significant adenopathy  Lymphatics - no palpable lymphadenopathy, no hepatosplenomegaly  Chest - clear to auscultation, no wheezes, rales or rhonchi, symmetric air entry  Heart - normal rate, regular rhythm, normal S1, S2, no murmurs, rubs, clicks or gallops, occ. premature beat  Abdomen - soft, nontender, nondistended, no masses or organomegaly  Back exam - full range of motion, no tenderness, palpable spasm or pain on motion  Neurological - alert, oriented, normal speech, no focal findings or movement disorder noted  Musculoskeletal - no joint tenderness, deformity or swelling  Extremities - peripheral pulses normal, no pedal edema, no clubbing or cyanosis  Skin - normal coloration and turgor, no rashes, no suspicious skin lesions noted    Labs / Preoperative Evaluation:      No results found for this or any previous visit (from the past 1008 hour(s)). Assessment:     Morbid obesity with comorbidity    Plan:     laparoscopic sleeve gastrectomy    This is a 61 y.o. female with a BMI of Body mass index is 40.71 kg/(m^2). and the weight-related co-morbidties as noted above. Gil Mc meets the NIH criteria for bariatric surgery based upon the BMI of Body mass index is 40.71 kg/(m^2). and multiple weight-related co-morbidties. Gil Mc has elected laparoscopic sleeve gastrectomy as her intervention of choice for treatment of morbid obestiy through surgical means secondary to its safety profile, rapid return to work  and decreases in operative risks over gastric bypass. In the office today, following Fabiola's history and physical examination, a 40 minute discussion regarding the anatomic alterations for the laparoscopic sleeve gastrectomy was undertaken. The dietary expectations and the patient  dependent factors for success were thoroughly discussed, to include the need for interval follow-up and long-term dietary changes associated with success. The possible complications of the sleeve gastrectomy  were also discussed, to include;death, DVT/PE, staple line leak, bleeding, stricture formation, infection, nutritional deficiencies and sleeve dilation. Specific weight related outcomes for success were also discussed with an emphasis on careful and close follow-up with the first year and eating behavior modification as the baseline and cyclical hunger return. The patient expressed an understanding of the above factors, and her questions were answered in their entirety.     In addition, the patient attended a 1.5 hour power point seminar regarding obesity, surgical weight loss including, adjustable gastric band, gastric bypass, and sleeve gastrectomy. This discussion contrasted the different surgical techniques, mechanisms of actions and expected outcomes, and surgical and medical risks associated with each procedure. During this seminar, there was a long question and answer session where each questions was answered until there were no additional questions. Today, the patient had all of her questions answered and the decision was made today that the patient's preoperative evaluation is acceptable for them  to proceed with bariatric surgery  choosing the sleeve gastrectomy as her surgical option. The patient understands the plan of action    Since the patient's original consult 2.5 months ago they have been seen by their PCP as she was started on xolair for asthma. There has been no change to their overall medical or surgical history and they have been on no steroids in any form. Secondary Diagnoses:     DVT / Pulmonary Embolus Risk - The patient is at a higher risk for post operative DVT / pulmonary embolus secondary to their morbid obese status, relative sedentary lifestyle, and impending general anesthetic. We will plan to use anticoagulation therapy pre and post operative as well as  pneumatic compression devices and encourage ambulation once on the hospital nursing floor. The need for possible at home anticoagulation therapy has also been discussed and any decision on this matter will be made during post operative evaluations. The patient understands that their efforts at ambulation are of vital importance to reduce the risk of this complication thus placing significant burden on them as to the prevention of such issues. Signs and symptoms of DVT / PE have been discussed with the patient and they have been instructed to call the office if any these occur in the \"at home\" post op phase.     Adult Onset Diabetes - The patient has natalia given a very low carbohydrate diet preoperatively along with instructions to monitor their blood sugars on a regular daily basis. When  their surgery is performed  we will be monitoring the patient with sliding scale insulin and accuchecks.  Based on those values we will determine whether the patient needs a reduction of those medications postoperatively or total removal of those medications on discharge.  We will have the patient continue accuchecks postoperatively while at home also and report to me or their family physician for appropriate adjustments as needed.  The patient also understands that in the event of uncontrolled blood sugar preoperatively that we may choose to postpone their surgery.     Dietary Intervention  - The patient is currently scheduled to see or has been followed by a bariatric nutritionist for an attempt at preoperative weight loss as has been dictated by their insurance carrier. They will be assessed at various times during their follow up to evaluate their progress depending on the length of time that is required once again by their carrier. I have explained the importance of preoperative weight loss and the benefits regarding lower surgical risk and also assisting the patient in reaching their weight loss goal.  Finally they understand there is a physiologic benefit from the standpoint of hepatic volume reduction and reduction of central visceral adiposity preoperatively. I have reiterated the importance of a low carbohydrate and high protein regimen to achieve their stated goal. I have reviewed their current eating behavior prior to this encounter and explained to them in an exhaustive fashion the appropriate diet that they should adhere to.  They have been encouraged to loose weight pre operatively and understand it is our prerogative to cancel surgery or postpone their procedure in the event of significant weight gain.      GERD -The patient understands that weight loss surgery is not a guaranteed cure for reflux disease but does understand the benefits that weight loss can have on reflux disease.  They also understand that at the time of surgery the gastroesophageal junction will be evaluated for the presence of a diaphragmatic hernia.  Hernias will be corrected always with the gastric band and sleeve gastrectomy procedures, but only on a case by case basis with the gastric bypass if it prevents our ability to perform the operation at hand, or if I feel that they would benefit long term with correction of this issue.  The patient also understands that neither weight loss surgery nor repair of a diaphragmatic hernia repair guarantees the complete cessation of the disease. They also understand there is a possibility of recurrence with a simple crural repair as is performed with these procedures. They understand they may have to continue their medications in the postoperative period. They have a good understanding that the gastric bypass procedure is better suited to total resolution of this issue and that neither the Lap Band nor sleeve gastrectomy is considered a curative procedure as it pertains to this diagnosis.     Hypertension - The patient has a clear understanding of how weight loss improves hypertension as a whole, but also they understand that there is a significant genetic component to this disease process.  We will monitor the patients blood pressure while in the hospital and the plan would be to continue those medications postoperatively.  If a diuretic is being used we will stop them on discharge to prevent dehydration particularly with the sleeve gastrectomy and the gastric bypass procedures.  They will be instructed to monitor their blood pressure postoperatively while at home and notify their primary care physician in the event of any significantly high or uncharacteristic readings.     Weight Related Arthritis -The patient understands the benefits that weight loss surgery can have on their arthritis but also understands that weight loss is not a guaranteed cure and relief of symptoms is often dependent on the severity of the underlying disease.  The patient also understands that traditional pharmaceutical treatments for this diagnosis are usually unavailable to post-operative weight loss patients due to the effects on the gastrointestinal tract particularly with the gastric bypass and to a lesser effect with the sleeve gastrectomy.  Any changes to the patients medication treatment will ultimately be made the patients PCP with input by our office.     Hyperlipidemia - The patient understands that studies show that almost all patient will realize an improvement in their lipid profile with weight loss that occurs with these procedures. They however also understand that hyperlipidemia is a multifactorial disease particularly as it pertains to their genetic background and that there is no guarantee toward cure  of this issue.  We will resume their medications both pre operatively and immediately postoperatively as this tends to decrease any post operative cardiac events.  The patient will follow up with their family physician in the postoperative period with plans to repeat their lipid panel 2-3 month postoperative for potential adjustment or removal of these medications.     Multiple Sclerosis- Patient well controlled on meds and has let her Neurologist know she is having surgery.     Signed By: Reginald Kim MD     August 28, 2017

## 2017-09-12 NOTE — IP AVS SNAPSHOT
Liliane Mack 
 
 
 [address] [facility] [address] 
[phone] Patient: Krista Gannon MRN: [id number] You are allergic to the following Allergen Reactions Shellfish Derived Shortness of Breath Swelling Adhesive Tape-Silicones Other (comments) Skin peels and blister Sulfa (Sulfonamide Antibiotics) Itching Recent Documentation Height Weight Breastfeeding? BMI OB Status Smoking Status 1.619 m 120.6 kg No 45.98 kg/m2 Hysterectomy Former Smoker Emergency Contacts Name Discharge Info Relation Home Work Mobile Jm Madrid DISCHARGE CAREGIVER [3] Spouse [3] [phone] About your hospitalization You were admitted on:  September 12, 2017 You last received care in the:  [address] [place] [address] You were discharged on:  September 13, 2017 Unit phone number:  [phone] Why you were hospitalized Your primary diagnosis was:  Not on File Your diagnoses also included: Morbid Obesity With Bmi Of 40.0-44.9, Adult (Hcc) Providers Seen During Your Hospitalizations Provider Role Specialty Primary office phone Rita Santos MD Attending Provider General Surgery [phone] Your Primary Care Physician (PCP) Primary Care Physician Office Phone [place] [address]  
 [facility]in [facility] 913 [phone] Follow-up Information Follow up With Details Comments Contact Info Brigitte Bello MD   [address] [facility] [address] [place] [address] 
[phone] Rita Santos MD On 9/27/2017 Follow up appointment scheduled for Sept 27, 2017 at 11:00 a.m. [address] [place] [address] 
[phone] Your Appointments Wednesday September 27, 2017 11:00 AM EDT  
POST OP with MD Yuri [facility] ([facility])  
 [address] [place] [address] [address]  
[phone] Wednesday October 11, 2017 10:15 AM EDT Office Visit with MD Nayla Scott [facility] ([address] [place] [address] Road)  
 [address] [place] [address]  
[phone] Wednesday October 11, 2017 11:00 AM EDT Office Visit with TSS NUTR VISIT2 Nayla [facility] ([address] [facility])  
 [address] [place] [address]  
[phone] Current Discharge Medication List  
  
CONTINUE these medications which have NOT CHANGED Dose & Instructions Dispensing Information Comments Morning Noon Evening Bedtime  
 baclofen 10 mg tablet Commonly known as:  LIORESAL Your last dose was: Your next dose is:    
   
   
 Dose:  10 mg Take 10 mg by mouth two (2) times a day. Indications: MS Refills:  0  
     
   
   
   
  
 cetirizine 10 mg tablet Commonly known as:  ZYRTEC Your last dose was: Your next dose is:    
   
   
 Dose:  10 mg Take 10 mg by mouth daily. Refills:  0  
     
   
   
   
  
 cyclobenzaprine 10 mg tablet Commonly known as:  FLEXERIL Your last dose was: Your next dose is:    
   
   
 Dose:  10 mg Take 10 mg by mouth daily. Refills:  0  
     
   
   
   
  
 cycloSPORINE 0.05 % ophthalmic emulsion Commonly known as:  RESTASIS Your last dose was: Your next dose is:    
   
   
 Dose:  1 Drop Administer 1 Drop to both eyes two (2) times a day. Refills:  0  
     
   
   
   
  
 dimethyl fumarate 240 mg Cpdr  
   
Your last dose was: Your next dose is:    
   
   
 Dose:  240 mg Take 240 mg by mouth two (2) times a day. Refills:  0 DULoxetine 60 mg capsule Commonly known as:  CYMBALTA Your last dose was:     
   
Your next dose is:    
   
   
 Dose:  60 mg  
 Take 60 mg by mouth nightly. Indications: ANXIETY WITH DEPRESSION, NEUROPATHIC PAIN Refills:  0 EPINEPHrine 0.3 mg/0.3 mL injection Commonly known as:  Alee Pathak Your last dose was: Your next dose is:    
   
   
 Dose:  0.3 mg  
0.3 mg by IntraMUSCular route once as needed for Anaphylaxis (Iodine/Shellfish). Indications: ANAPHYLAXIS Refills:  0  
     
   
   
   
  
 gabapentin 300 mg capsule Commonly known as:  NEURONTIN Your last dose was: Your next dose is:    
   
   
 Dose:  600 mg Take 600 mg by mouth two (2) times a day. Refills:  0  
     
   
   
   
  
 multivitamin tablet Commonly known as:  ONE A DAY Your last dose was: Your next dose is:    
   
   
 Dose:  1 Tab Take 1 Tab by mouth daily. Refills:  0  
     
   
   
   
  
 oxyCODONE-acetaminophen 5-325 mg per tablet Commonly known as:  PERCOCET Your last dose was: Your next dose is:    
   
   
 Dose:  1 Tab Take 1 Tab by mouth every four (4) hours as needed for Pain. Max Daily Amount: 6 Tabs. Quantity:  30 Tab Refills:  0 PEPCID AC 20 mg tablet Generic drug:  famotidine Your last dose was: Your next dose is:    
   
   
 Dose:  20 mg Take 20 mg by mouth nightly. Indications: gastroesophageal reflux disease Refills:  0  
     
   
   
   
  
 SYNTHROID 112 mcg tablet Generic drug:  levothyroxine Your last dose was: Your next dose is:    
   
   
 Dose:  112 mcg Take 112 mcg by mouth Daily (before breakfast). Indications: ADJUNCT TO SURGERY OR RADIOTHERAPY FOR THYROID CARCINOMA Refills:  0  
     
   
   
   
  
 telmisartan 40 mg tablet Commonly known as:  Milo Robert Your last dose was: Your next dose is:    
   
   
 Dose:  40 mg Take 40 mg by mouth daily. Indications: HYPERTENSION Refills:  0  
     
   
   
   
  
 TYLENOL EXTRA STRENGTH 500 mg tablet Generic drug:  acetaminophen Your last dose was: Your next dose is:    
   
   
 Dose:  1000 mg Take 1,000 mg by mouth daily. Refills:  0  
     
   
   
   
  
 XOLAIR SC Your last dose was: Your next dose is:    
   
   
 by SubCUTAneous route every thirty (30) days. Refills:  0  
     
   
   
   
  
 zolpidem 10 mg tablet Commonly known as:  AMBIEN Your last dose was: Your next dose is:    
   
   
 Dose:  10 mg Take 10 mg by mouth nightly as needed for Sleep. Refills:  0 STOP taking these medications Biotin 2,500 mcg Cap  
   
  
 black cohosh 540 mg Cap CeleBREX 200 mg capsule Generic drug:  celecoxib  
   
  
 cranberry extract 450 mg Tab tablet FISH OIL PO  
   
  
 flaxseed oil 1,000 mg Cap  
   
  
 ibuprofen 800 mg tablet Commonly known as:  MOTRIN Discharge Instructions [facility] Ad[facility] M.D., F.A.C.S. 
[address] [place] [address]., [address] Marylee Compton, [address] Office: [phone]    Fax:  [phone] Discharge Instruction for Sleeve Gastrectomy Patients Diet: 
? Continue with the liquid diet until you are seen in the office. Make sure you sip fluids all day. Goal for total fluid intake is a minimum of 64 ounces per day. ? Aim for  Grams of protein every day. Activity: 
? Walk a minimum of 30 minutes every day. Rest when you are tired. ? You may shower. No baths, hot tubs or swimming. ? You may climb stairs. Take your time. ? No lifting more than 10-15 lbs. ? If you feel discomfort during an activity, rest. 
? Do not drive for 1 week or until you are off of all narcotics. Wound Care: ? Clean incisions with soap and water when in the shower. Pat dry. ? Leave steri-strips on until they fall off. ? A small amount of drainage may be present from the incisions.   Contact the office if you notice an increase in drainage, an odor, increased redness, or fever > 100.5. Medications: 
? You will receive a prescription for pain medication and Prilosec at the time of discharge. ? For upset stomach you may take over the counter medications such as Maalox, Mylanta, or Pepto Bismol. ? Gas X works very well for bloating when eating or drinking. ? You may take Tylenol. ? Non-aspirin based arthritis medications may be resumed. ? Take a childrens or adult chewable multivitamin. ? Milk of Magnesia will help with constipation. ? It is fine to take your usual home medications. Blood pressure medications should be continued after surgery. Diabetic medications can frequently be reduced very quickly after surgery. Diabetics should continue to monitor blood sugars frequently after surgery and contact the prescribing physician for any questions. Follow-Up Appointment: 
? If you do not already have a follow-up appointment scheduled, contact the office in the next few days to obtain one. It is usually scheduled for 10-14 days after your surgery date. Office phone number:  [phone].  
? Call our office if you have questions, concerns or any worsening of condition. If you are not able to reach us, go to your primary care provider or the Emergency Department. DISCHARGE SUMMARY from Nurse The following personal items are in your possession at time of discharge: 
 
Dental Appliances: With patient Visual Aid: Glasses Home Medications: None Jewelry: None Clothing: Footwear, Pants, Shirt, Socks, Undergarments (given to spouse Mynor Rubio ) Other Valuables: Eyeglasses, Cell Phone (given to spouse ) Personal Items Sent to Safe: none PATIENT INSTRUCTIONS: 
 
 
Recognize signs and symptoms of STROKE: 
 F-face looks uneven A-arms unable to move or move unevenly S-speech slurred or non-existent T-time-call 911 as soon as signs and symptoms begin-DO NOT go Back to bed or wait to see if you get better-TIME IS BRAIN. Warning Signs of HEART ATTACK Call 911 if you have these symptoms: 
? Chest discomfort. Most heart attacks involve discomfort in the center of the chest that lasts more than a few minutes, or that goes away and comes back. It can feel like uncomfortable pressure, squeezing, fullness, or pain. ? Discomfort in other areas of the upper body. Symptoms can include pain or discomfort in one or both arms, the back, neck, jaw, or stomach. ? Shortness of breath with or without chest discomfort. ? Other signs may include breaking out in a cold sweat, nausea, or lightheadedness. Don't wait more than five minutes to call [address]! Fast action can save your life. Calling 911 is almost always the fastest way to get lifesaving treatment. Emergency Medical Services staff can begin treatment when they arrive  up to an hour sooner than if someone gets to the hospital by car. The discharge information has been reviewed with the patient. The patient verbalized understanding. Discharge medications reviewed with the patient and appropriate educational materials and side effects teaching were provided. Patient armband removed and shredded Discharge Orders None [employer] Announcement We are excited to announce that we are making your provider's discharge notes available to you in [place]. You will see these notes when they are completed and signed by the physician that discharged you from your recent hospital stay. If you have any questions or concerns about any information you see in [place], please call the Health Information Department where you were seen or reach out to your Primary Care Provider for more information about your plan of care. Introducing [facility]! Teresa Gabriel introduces Q-Sensei patient portal. Now you can access parts of your medical record, email your doctor's office, and request medication refills online. 1. In your internet browser, go to [email or web address] 2. Click on the First Time User? Click Here link in the Sign In box. You will see the New Member Sign Up page. 3. Enter your Q-Sensei Access Code exactly as it appears below. You will not need to use this code after youve completed the sign-up process. If you do not sign up before the expiration date, you must request a new code. · Q-Sensei Access Code: [email or web address] Expires: 9/14/2017  2:23 PM 
 
4. Enter the last four digits of your Social Security Number (xxxx) and Date of Birth (mm/dd/yyyy) as indicated and click Submit. You will be taken to the next sign-up page. 5. Create a Q-Sensei ID. This will be your Q-Sensei login ID and cannot be changed, so think of one that is secure and easy to remember. 6. Create a Q-Sensei password. You can change your password at any time. 7. Enter your Password Reset Question and Answer. This can be used at a later time if you forget your password. 8. Enter your e-mail address. You will receive e-mail notification when new information is available in [address] 9. Click Sign Up. You can now view and download portions of your medical record. 10. Click the Download Summary menu link to download a portable copy of your medical information. If you have questions, please visit the Frequently Asked Questions section of the Q-Sensei website. Remember, Q-Sensei is NOT to be used for urgent needs. For medical emergencies, dial 911. Now available from your iPhone and Android! General Information Please provide this summary of care documentation to your next provider. Patient Signature:  ____________________________________________________________ Date:  ____________________________________________________________  
  
Gwendloyn Finger Provider Signature:  ____________________________________________________________ Date:  ____________________________________________________________

## 2017-09-12 NOTE — INTERVAL H&P NOTE
H&P Update:  Betty Bey was seen and examined. History and physical has been reviewed. The patient has been examined.  There have been no significant clinical changes since the completion of the originally dated History and Physical.    Signed By: Shaylee Eisenberg MD     September 12, 2017 11:15 AM

## 2017-09-13 ENCOUNTER — APPOINTMENT (OUTPATIENT)
Dept: GENERAL RADIOLOGY | Age: 57
DRG: 621 | End: 2017-09-13
Attending: SPECIALIST
Payer: MEDICARE

## 2017-09-13 VITALS
DIASTOLIC BLOOD PRESSURE: 48 MMHG | WEIGHT: 265.8 LBS | HEIGHT: 64 IN | SYSTOLIC BLOOD PRESSURE: 134 MMHG | BODY MASS INDEX: 45.38 KG/M2 | RESPIRATION RATE: 18 BRPM | HEART RATE: 86 BPM | TEMPERATURE: 98.1 F | OXYGEN SATURATION: 100 %

## 2017-09-13 LAB — GLUCOSE BLD STRIP.AUTO-MCNC: 111 MG/DL (ref 70–110)

## 2017-09-13 PROCEDURE — 74240 X-RAY XM UPR GI TRC 1CNTRST: CPT

## 2017-09-13 PROCEDURE — 51798 US URINE CAPACITY MEASURE: CPT

## 2017-09-13 PROCEDURE — 77010033678 HC OXYGEN DAILY

## 2017-09-13 PROCEDURE — 74011636320 HC RX REV CODE- 636/320: Performed by: SPECIALIST

## 2017-09-13 PROCEDURE — 82962 GLUCOSE BLOOD TEST: CPT

## 2017-09-13 PROCEDURE — 74011250637 HC RX REV CODE- 250/637: Performed by: SPECIALIST

## 2017-09-13 PROCEDURE — 74011250636 HC RX REV CODE- 250/636: Performed by: SPECIALIST

## 2017-09-13 RX ORDER — SUCRALFATE 1 G/10ML
1 SUSPENSION ORAL
Status: DISCONTINUED | OUTPATIENT
Start: 2017-09-13 | End: 2017-09-13 | Stop reason: HOSPADM

## 2017-09-13 RX ORDER — FAMOTIDINE 20 MG/1
20 TABLET, FILM COATED ORAL
Status: COMPLETED | OUTPATIENT
Start: 2017-09-13 | End: 2017-09-13

## 2017-09-13 RX ORDER — OXYCODONE AND ACETAMINOPHEN 5; 325 MG/1; MG/1
1 TABLET ORAL
Status: DISCONTINUED | OUTPATIENT
Start: 2017-09-13 | End: 2017-09-13 | Stop reason: HOSPADM

## 2017-09-13 RX ADMIN — CEFAZOLIN SODIUM 2 G: 2 SOLUTION INTRAVENOUS at 04:49

## 2017-09-13 RX ADMIN — IOHEXOL 30 ML: 240 INJECTION, SOLUTION INTRATHECAL; INTRAVASCULAR; INTRAVENOUS; ORAL at 09:33

## 2017-09-13 RX ADMIN — ACETAMINOPHEN 1000 MG: 10 INJECTION, SOLUTION INTRAVENOUS at 05:56

## 2017-09-13 RX ADMIN — FAMOTIDINE 20 MG: 20 TABLET, FILM COATED ORAL at 06:15

## 2017-09-13 RX ADMIN — ONDANSETRON 4 MG: 2 INJECTION INTRAMUSCULAR; INTRAVENOUS at 04:28

## 2017-09-13 RX ADMIN — SUCRALFATE 1 G: 1 SUSPENSION ORAL at 09:11

## 2017-09-13 RX ADMIN — SUCRALFATE 1 G: 1 SUSPENSION ORAL at 12:36

## 2017-09-13 RX ADMIN — ENOXAPARIN SODIUM 40 MG: 40 INJECTION SUBCUTANEOUS at 10:08

## 2017-09-13 RX ADMIN — SODIUM CHLORIDE, SODIUM LACTATE, POTASSIUM CHLORIDE, AND CALCIUM CHLORIDE 150 ML/HR: 600; 310; 30; 20 INJECTION, SOLUTION INTRAVENOUS at 07:34

## 2017-09-13 RX ADMIN — OXYCODONE HYDROCHLORIDE AND ACETAMINOPHEN 1 TABLET: 5; 325 TABLET ORAL at 10:08

## 2017-09-13 RX ADMIN — ACETAMINOPHEN 1000 MG: 10 INJECTION, SOLUTION INTRAVENOUS at 07:00

## 2017-09-13 RX ADMIN — ACETAMINOPHEN 1000 MG: 10 INJECTION, SOLUTION INTRAVENOUS at 00:42

## 2017-09-13 NOTE — PROGRESS NOTES
1100 NICOLASA drain removed at this time. Site clean, dry, and intact. Patient tolerated well. Stitches that were holding drain in removed by this nurse per patients nurse. All stitches removed and drain tip intact. Site covered with gauze and tegaderm. Patient states that she is allergic to tape.

## 2017-09-13 NOTE — PROGRESS NOTES
Pt received to unit at this time with no c/o pain or distress. Assessment complete. Call light in reach, safety and comfort measures are in place.

## 2017-09-13 NOTE — PROGRESS NOTES
Shift Summary :  A usual post op night without major concerns. Hard to void . In and out catheterization which led to successful urination on her own. Surgical sites unremarkable with NICOLASA drain patent.

## 2017-09-13 NOTE — DISCHARGE SUMMARY
Discharge Summary    Patient: Raya Sow               Sex: female          DOA: 9/12/2017         YOB: 1960      Age:  61 y.o.        LOS:  LOS: 1 day                Admit Date: 9/12/2017    Discharge Date: 9/13/2017    Admission Diagnoses: HYPERTENSION,MORBID OBESITY,GERD,OSTEOARTHRITIS BILATERAL KNEES,BMI 39  Morbid obesity with BMI of 40.0-44.9, adult [facility])    Discharge Diagnoses:    Problem List as of 9/13/2017  Date Reviewed: 9/12/2017          Codes Class Noted - Resolved    Morbid obesity with BMI of 40.0-44.9, adult ([facility] ICD-10-CM: E66.01, Z68.41  ICD-9-CM: 278.01, V85.41  Unknown - Present        Osteoarthritis of right knee ICD-10-CM: M17.11  ICD-9-CM: 715.96  Unknown - Present        Borderline diabetes ICD-10-CM: R73.03  ICD-9-CM: 790.29  Unknown - Present        Thyroid cancer ([facility].) ICD-10-CM: C73  ICD-9-CM: 193  Unknown - Present        Rheumatoid arthritis ([facility].) ICD-10-CM: M06.9  ICD-9-CM: 714.0  Unknown - Present        Morbid obesity ([facility].) ICD-10-CM: E66.01  ICD-9-CM: 278.01  Unknown - Present        Hypertension ICD-10-CM: I10  ICD-9-CM: 401.9  Unknown - Present        GERD (gastroesophageal reflux disease) ICD-10-CM: K21.9  ICD-9-CM: 530.81  Unknown - Present        Asthma ICD-10-CM: J45.909  ICD-9-CM: 493.90  Unknown - Present    Overview Signed 6/12/2017  9:38 AM by Nyasia Ballard MD     seasonal             Adverse effect of anesthesia ICD-10-CM: T41.45XA  ICD-9-CM: 995.22  Unknown - Present    Overview Signed 6/12/2017  9:38 AM by Nyasia Ballard MD     small airway, difficult intubation, pt requesting anesthesia consult             Osteoarthritis of left knee (Chronic) ICD-10-CM: M17.12  ICD-9-CM: 715.96  3/24/2016 - Present              Discharge Condition: Good    Hospital Course: The patient underwent  laparoscopic sleeve gastrectomy  on 9/12/2017. The patient tolerated the procedure well.  Vital signs remained stable and the patient was transferred to  [facility] floor surgical unit without complications. The patient remained stable throughout the first night post operatively with stable vital signs and adequate urine output and pain control. Pain was controlled with Dilaudid IV and IV Tylenol . The patient on the first morning post operative was transferred to the radiology suite where they underwent a gastrograffin UGI study which showed no evidence of a leak or stricture. The drain was discontinued on POD # 1 and the patient was started on a bariatric liquid diet with protein shakes. The patient progressed throughout the day and was ambulating well and tolerating their diet. They were therefore discharged home with instructions to notify me with any issues that may arise. Significant Diagnostic Studies:   No results for input(s): HGB, HGBEXT in the last 72 hours. No results for input(s): HCT, HCTEXT in the last 72 hours. Current Discharge Medication List      CONTINUE these medications which have NOT CHANGED    Details   oxyCODONE-acetaminophen (PERCOCET) 5-325 mg per tablet Take 1 Tab by mouth every four (4) hours as needed for Pain. Max Daily Amount: 6 Tabs. Qty: 30 Tab, Refills: 0      famotidine (PEPCID AC) 20 mg tablet Take 20 mg by mouth nightly. Indications: gastroesophageal reflux disease      cetirizine (ZYRTEC) 10 mg tablet Take 10 mg by mouth daily. levothyroxine (SYNTHROID) 112 mcg tablet Take 112 mcg by mouth Daily (before breakfast). Indications: ADJUNCT TO SURGERY OR RADIOTHERAPY FOR THYROID CARCINOMA      acetaminophen (TYLENOL EXTRA STRENGTH) 500 mg tablet Take 1,000 mg by mouth daily. multivitamin (ONE A DAY) tablet Take 1 Tab by mouth daily. OMALIZUMAB (XOLAIR SC) by SubCUTAneous route every thirty (30) days. gabapentin (NEURONTIN) 300 mg capsule Take 600 mg by mouth two (2) times a day. zolpidem (AMBIEN) 10 mg tablet Take 10 mg by mouth nightly as needed for Sleep.       cyclobenzaprine (FLEXERIL) 10 mg tablet Take 10 mg by mouth daily. dimethyl fumarate 240 mg cpDR Take 240 mg by mouth two (2) times a day. baclofen (LIORESAL) 10 mg tablet Take 10 mg by mouth two (2) times a day. Indications: MS      telmisartan (MICARDIS) 40 mg tablet Take 40 mg by mouth daily. Indications: HYPERTENSION      cycloSPORINE (RESTASIS) 0.05 % ophthalmic emulsion Administer 1 Drop to both eyes two (2) times a day. EPINEPHrine (EPIPEN) 0.3 mg/0.3 mL (1:1,000) injection 0.3 mg by IntraMUSCular route once as needed for Anaphylaxis (Iodine/Shellfish). Indications: ANAPHYLAXIS      DULoxetine (CYMBALTA) 60 mg capsule Take 60 mg by mouth nightly. Indications: ANXIETY WITH DEPRESSION, NEUROPATHIC PAIN         STOP taking these medications       celecoxib (CELEBREX) 200 mg capsule Comments:   Reason for Stopping:         DOCOSAHEXANOIC ACID/EPA (FISH OIL PO) Comments:   Reason for Stopping:         ibuprofen (MOTRIN) 800 mg tablet Comments:   Reason for Stopping:         flaxseed oil 1,000 mg cap Comments:   Reason for Stopping:         black cohosh 540 mg cap Comments:   Reason for Stopping:         Biotin 2,500 mcg cap Comments:   Reason for Stopping:         cranberry extract 450 mg tab Comments:   Reason for Stopping:               Activity: activity as tolerated with no heavy lifting of greater than 20 pounds. No anti- inflammatory medications. Use stool softeners at home as needed while taking pain medications since they are constipating. Diet: Bariatric liquid diet    Wound Care: Keep wound clean and dry, Reinforce dressing PRN and ice to area for comfort. Do not get wound wet for 2 days.     Follow-up: 14 days with Dr Selma Cabrera M.D

## 2017-09-13 NOTE — NURSE NAVIGATOR
Doing well. UGI results confirmed. Tolerating liquid diet and protein shakes. Denies nausea. Pain - 5 \"ariella\". NICOLASA output WNL and discontinued. .  Adequate urine output. Encouraged to continue to cough, deep breathe and use spirometer every hour while awake. Encouraged to be out of bed ambulating no less than 3 times today. Dressings removed. Incisions dry and intact. Discussed diet and activities after discharge.

## 2017-09-13 NOTE — ROUTINE PROCESS
Dual AVS reviewed with Kayy Adamson RN. All medications reviewed individually with patient. Opportunities for questions and concerns provided. Patient discharged via (mode of transport ie. Car, ambulance or air transport) wheelchair. Patient's arm band appropriately discarded.

## 2017-09-13 NOTE — PROGRESS NOTES
Bariatric Surgery                POD #1    Visit Vitals    /58 (BP 1 Location: Right arm, BP Patient Position: Sitting)    Pulse 87    Temp 98.1 °F (36.7 °C)    Resp 18    Ht 5' 3.75\" (1.619 m)    Wt 120.6 kg (265 lb 12.8 oz)    SpO2 100%    Breastfeeding No    BMI 45.98 kg/m2     Patient has minimal complaints of pain, minimal nausea noted     Exam:  Appears well in no distress  Lungs- clear bilaterally  Abd - soft, incisions look good without erythema           NICOLASA with minimal serosanguinous output  Extremities- no new edema or swelling    UGI - pending    Data Review:    Labs: Results:       Chemistry No results for input(s): GLU, NA, K, CL, CO2, BUN, CREA, CA, AGAP, BUCR, TBIL, GPT, AP, TP, ALB, GLOB, AGRAT in the last 72 hours. CBC w/Diff No results for input(s): WBC, RBC, HGB, HCT, PLT, GRANS, LYMPH, EOS, RETIC, HGBEXT, HCTEXT, PLTEXT in the last 72 hours. Coagulation No results for input(s): PTP, INR, APTT in the last 72 hours. No lab exists for component: INREXT    Liver Enzymes No results for input(s): TP, ALB, TBIL, AP, SGOT, GPT in the last 72 hours. No lab exists for component: DBIL       Assessment/Plan: S/P  laparoscopic sleeve gastrectomy - doing well without any issues  Orders to be followed after UGI is completed and found to be w/out anatomical defect. 1.Start bariatric diet and protein shakes  2. D/C IV pain meds and start PO pain meds  3. D/C NICOLASA drain  4. Likely PM D/C if continues to be okay and tolerates PO

## 2017-09-13 NOTE — DISCHARGE INSTRUCTIONS
[address] [place] [address] [facility]  Brissa Vaelnzuela. Angela Lujan M.D., F.A.C.S.  [address] [place] [address] Drive., [address], [address] [place] [address]  Office: [phone]    Fax:  [phone]    Discharge Instruction for Sleeve Gastrectomy Patients    Diet:   Continue with the liquid diet until you are seen in the office. Make sure you sip fluids all day. Goal for total fluid intake is a minimum of 64 ounces per day.  Aim for  Grams of protein every day. Activity:   Walk a minimum of 30 minutes every day. Rest when you are tired.  You may shower. No baths, hot tubs or swimming.  You may climb stairs. Take your time.  No lifting more than 10-15 lbs.  If you feel discomfort during an activity, rest.   Do not drive for 1 week or until you are off of all narcotics. Wound Care:   Clean incisions with soap and water when in the shower. Pat dry.  Leave steri-strips on until they fall off.  A small amount of drainage may be present from the incisions. Contact the office if you notice an increase in drainage, an odor, increased redness, or fever > 100.5. Medications:   You will receive a prescription for pain medication and Prilosec at the time of discharge.  For upset stomach you may take over the counter medications such as Maalox, Mylanta, or Pepto Bismol.  Gas X works very well for bloating when eating or drinking.  You may take Tylenol.  Non-aspirin based arthritis medications may be resumed.  Take a childrens or adult chewable multivitamin.  Milk of Magnesia will help with constipation.  It is fine to take your usual home medications. Blood pressure medications should be continued after surgery. Diabetic medications can frequently be reduced very quickly after surgery. Diabetics should continue to monitor blood sugars frequently after surgery and contact the prescribing physician for any questions.     Follow-Up Appointment:   If you do not already have a follow-up appointment scheduled, contact the office in the next few days to obtain one. It is usually scheduled for 10-14 days after your surgery date. Office phone number:  [phone].  Call our office if you have questions, concerns or any worsening of condition. If you are not able to reach us, go to your primary care provider or the Emergency Department. DISCHARGE SUMMARY from Nurse    The following personal items are in your possession at time of discharge:    Dental Appliances: With patient  Visual Aid: Glasses     Home Medications: None  Jewelry: None  Clothing: Footwear, Pants, Shirt, Socks, Undergarments (given to spouse Sharon Pham )  Other Valuables: Eyeglasses, Cell Phone (given to spouse )  Personal Items Sent to Safe: none          PATIENT INSTRUCTIONS:    After general anesthesia or intravenous sedation, for 24 hours or while taking prescription Narcotics:  · Limit your activities  · Do not drive and operate hazardous machinery  · Do not make important personal or business decisions  · Do  not drink alcoholic beverages  · If you have not urinated within 8 hours after discharge, please contact your surgeon on call. Report the following to your surgeon:  · Excessive pain, swelling, redness or odor of or around the surgical area  · Temperature over 100.5  · Nausea and vomiting lasting longer than 4 hours or if unable to take medications  · Any signs of decreased circulation or nerve impairment to extremity: change in color, persistent  numbness, tingling, coldness or increase pain  · Any questions        What to do at Home:  Recommended activity: Activity as tolerated, Ambulate in house, No heavy lifting, pushing, pulling and No driving while on analgesics        *  Please give a list of your current medications to your Primary Care Provider. *  Please update this list whenever your medications are discontinued, doses are      changed, or new medications (including over-the-counter products) are added.     * Please carry medication information at all times in case of emergency situations. These are general instructions for a healthy lifestyle:    No smoking/ No tobacco products/ Avoid exposure to second hand smoke    Surgeon General's Warning:  Quitting smoking now greatly reduces serious risk to your health. Obesity, smoking, and sedentary lifestyle greatly increases your risk for illness    A healthy diet, regular physical exercise & weight monitoring are important for maintaining a healthy lifestyle    You may be retaining fluid if you have a history of heart failure or if you experience any of the following symptoms:  Weight gain of 3 pounds or more overnight or 5 pounds in a week, increased swelling in our hands or feet or shortness of breath while lying flat in bed. Please call your doctor as soon as you notice any of these symptoms; do not wait until your next office visit. Recognize signs and symptoms of STROKE:    F-face looks uneven    A-arms unable to move or move unevenly    S-speech slurred or non-existent    T-time-call 911 as soon as signs and symptoms begin-DO NOT go       Back to bed or wait to see if you get better-TIME IS BRAIN. Warning Signs of HEART ATTACK     Call 911 if you have these symptoms:   Chest discomfort. Most heart attacks involve discomfort in the center of the chest that lasts more than a few minutes, or that goes away and comes back. It can feel like uncomfortable pressure, squeezing, fullness, or pain.  Discomfort in other areas of the upper body. Symptoms can include pain or discomfort in one or both arms, the back, neck, jaw, or stomach.  Shortness of breath with or without chest discomfort.  Other signs may include breaking out in a cold sweat, nausea, or lightheadedness. Don't wait more than five minutes to call 911 - MINUTES MATTER! Fast action can save your life. Calling 911 is almost always the fastest way to get lifesaving treatment.  Emergency Medical Services staff can begin treatment when they arrive -- up to an hour sooner than if someone gets to the hospital by car. The discharge information has been reviewed with the patient. The patient verbalized understanding. Discharge medications reviewed with the patient and appropriate educational materials and side effects teaching were provided.     Patient armband removed and shredded

## 2017-09-13 NOTE — ROUTINE PROCESS
Bedside and Verbal shift change report given to RODRIGO Espinal RN  (oncoming nurse) by  STEPHANIE Simms RN  (offgoing nurse). Report included the following information SBAR, Kardex, Recent Results and Med Rec Status.

## 2017-09-14 NOTE — PROGRESS NOTES
Pt alert and awake with no c/o pain or distress. Assessment complete.  Call light in reach, safety and comfort measures are in place

## 2017-09-20 ENCOUNTER — APPOINTMENT (OUTPATIENT)
Dept: GENERAL RADIOLOGY | Age: 57
End: 2017-09-20
Attending: FAMILY MEDICINE
Payer: MEDICARE

## 2017-09-20 ENCOUNTER — TELEPHONE (OUTPATIENT)
Dept: SURGERY | Age: 57
End: 2017-09-20

## 2017-09-20 ENCOUNTER — HOSPITAL ENCOUNTER (EMERGENCY)
Age: 57
Discharge: HOME | End: 2017-09-20
Attending: FAMILY MEDICINE
Payer: MEDICARE

## 2017-09-20 ENCOUNTER — APPOINTMENT (OUTPATIENT)
Dept: CT IMAGING | Age: 57
End: 2017-09-20
Attending: FAMILY MEDICINE
Payer: MEDICARE

## 2017-09-20 VITALS
RESPIRATION RATE: 15 BRPM | WEIGHT: 238 LBS | DIASTOLIC BLOOD PRESSURE: 53 MMHG | SYSTOLIC BLOOD PRESSURE: 148 MMHG | BODY MASS INDEX: 37.35 KG/M2 | OXYGEN SATURATION: 100 % | HEART RATE: 79 BPM | HEIGHT: 67 IN | TEMPERATURE: 97.3 F

## 2017-09-20 DIAGNOSIS — R53.1 GENERALIZED WEAKNESS: ICD-10-CM

## 2017-09-20 DIAGNOSIS — R06.09 DYSPNEA ON EXERTION: Primary | ICD-10-CM

## 2017-09-20 LAB
ALBUMIN SERPL-MCNC: 3.7 G/DL (ref 3.4–5)
ALBUMIN/GLOB SERPL: 0.8 {RATIO} (ref 0.8–1.7)
ALP SERPL-CCNC: 71 U/L (ref 45–117)
ALT SERPL-CCNC: 35 U/L (ref 13–56)
ANION GAP SERPL CALC-SCNC: 17 MMOL/L (ref 3–18)
APPEARANCE UR: CLEAR
AST SERPL-CCNC: 33 U/L (ref 15–37)
BASOPHILS # BLD: 0.1 K/UL (ref 0–0.06)
BASOPHILS NFR BLD: 1 % (ref 0–2)
BILIRUB SERPL-MCNC: 0.8 MG/DL (ref 0.2–1)
BILIRUB UR QL: NEGATIVE
BNP SERPL-MCNC: 9 PG/ML (ref 0–900)
BUN SERPL-MCNC: 14 MG/DL (ref 7–18)
BUN/CREAT SERPL: 19 (ref 12–20)
CALCIUM SERPL-MCNC: 9.5 MG/DL (ref 8.5–10.1)
CHLORIDE SERPL-SCNC: 98 MMOL/L (ref 100–108)
CK MB CFR SERPL CALC: 1.4 % (ref 0–4)
CK MB SERPL-MCNC: 1 NG/ML (ref 5–25)
CK SERPL-CCNC: 69 U/L (ref 26–192)
CO2 SERPL-SCNC: 26 MMOL/L (ref 21–32)
COLOR UR: YELLOW
CREAT SERPL-MCNC: 0.75 MG/DL (ref 0.6–1.3)
DIFFERENTIAL METHOD BLD: ABNORMAL
EOSINOPHIL # BLD: 0.5 K/UL (ref 0–0.4)
EOSINOPHIL NFR BLD: 7 % (ref 0–5)
ERYTHROCYTE [DISTWIDTH] IN BLOOD BY AUTOMATED COUNT: 16.7 % (ref 11.6–14.5)
GLOBULIN SER CALC-MCNC: 4.8 G/DL (ref 2–4)
GLUCOSE SERPL-MCNC: 87 MG/DL (ref 74–99)
GLUCOSE UR STRIP.AUTO-MCNC: NEGATIVE MG/DL
HCT VFR BLD AUTO: 37.6 % (ref 35–45)
HGB BLD-MCNC: 13.1 G/DL (ref 12–16)
HGB UR QL STRIP: NEGATIVE
KETONES UR QL STRIP.AUTO: 40 MG/DL
LEUKOCYTE ESTERASE UR QL STRIP.AUTO: NEGATIVE
LYMPHOCYTES # BLD: 1.8 K/UL (ref 0.9–3.6)
LYMPHOCYTES NFR BLD: 26 % (ref 21–52)
MAGNESIUM SERPL-MCNC: 2.2 MG/DL (ref 1.6–2.6)
MCH RBC QN AUTO: 24.3 PG (ref 24–34)
MCHC RBC AUTO-ENTMCNC: 34.8 G/DL (ref 31–37)
MCV RBC AUTO: 69.9 FL (ref 74–97)
MONOCYTES # BLD: 0.5 K/UL (ref 0.05–1.2)
MONOCYTES NFR BLD: 8 % (ref 3–10)
NEUTS SEG # BLD: 4.1 K/UL (ref 1.8–8)
NEUTS SEG NFR BLD: 58 % (ref 40–73)
NITRITE UR QL STRIP.AUTO: NEGATIVE
PH UR STRIP: 5.5 [PH] (ref 5–8)
PLATELET # BLD AUTO: 348 K/UL (ref 135–420)
PMV BLD AUTO: 10.4 FL (ref 9.2–11.8)
POTASSIUM SERPL-SCNC: 3.6 MMOL/L (ref 3.5–5.5)
PROT SERPL-MCNC: 8.5 G/DL (ref 6.4–8.2)
PROT UR STRIP-MCNC: NEGATIVE MG/DL
RBC # BLD AUTO: 5.38 M/UL (ref 4.2–5.3)
SODIUM SERPL-SCNC: 141 MMOL/L (ref 136–145)
SP GR UR REFRACTOMETRY: >1.03 (ref 1–1.03)
TROPONIN I SERPL-MCNC: <0.02 NG/ML (ref 0–0.06)
UROBILINOGEN UR QL STRIP.AUTO: 1 EU/DL (ref 0.2–1)
WBC # BLD AUTO: 6.9 K/UL (ref 4.6–13.2)

## 2017-09-20 PROCEDURE — 80053 COMPREHEN METABOLIC PANEL: CPT | Performed by: FAMILY MEDICINE

## 2017-09-20 PROCEDURE — 71275 CT ANGIOGRAPHY CHEST: CPT

## 2017-09-20 PROCEDURE — 74011250636 HC RX REV CODE- 250/636: Performed by: FAMILY MEDICINE

## 2017-09-20 PROCEDURE — 96361 HYDRATE IV INFUSION ADD-ON: CPT

## 2017-09-20 PROCEDURE — 82550 ASSAY OF CK (CPK): CPT | Performed by: FAMILY MEDICINE

## 2017-09-20 PROCEDURE — 99285 EMERGENCY DEPT VISIT HI MDM: CPT

## 2017-09-20 PROCEDURE — 81003 URINALYSIS AUTO W/O SCOPE: CPT | Performed by: FAMILY MEDICINE

## 2017-09-20 PROCEDURE — 83880 ASSAY OF NATRIURETIC PEPTIDE: CPT | Performed by: FAMILY MEDICINE

## 2017-09-20 PROCEDURE — 85025 COMPLETE CBC W/AUTO DIFF WBC: CPT | Performed by: FAMILY MEDICINE

## 2017-09-20 PROCEDURE — 74011636320 HC RX REV CODE- 636/320: Performed by: FAMILY MEDICINE

## 2017-09-20 PROCEDURE — 71010 XR CHEST PORT: CPT

## 2017-09-20 PROCEDURE — 83735 ASSAY OF MAGNESIUM: CPT | Performed by: FAMILY MEDICINE

## 2017-09-20 PROCEDURE — 93005 ELECTROCARDIOGRAM TRACING: CPT

## 2017-09-20 PROCEDURE — 96360 HYDRATION IV INFUSION INIT: CPT

## 2017-09-20 RX ADMIN — SODIUM CHLORIDE 1000 ML: 900 INJECTION, SOLUTION INTRAVENOUS at 18:37

## 2017-09-20 RX ADMIN — SODIUM CHLORIDE 1000 ML: 900 INJECTION, SOLUTION INTRAVENOUS at 17:37

## 2017-09-20 RX ADMIN — IOPAMIDOL 100 ML: 755 INJECTION, SOLUTION INTRAVENOUS at 16:46

## 2017-09-20 NOTE — ED NOTES
Pt report given to Samara Lau RN. SBAR, ED summary, MAR and recent results reviewed. Pt resting in stretcher with side rails raised and call light within reach. Pt in NAD at this time.  at bedside.

## 2017-09-20 NOTE — ED TRIAGE NOTES
Patient with complaints of shortness of breath, dizziness and generalized weakness that started yesterday. Patient states that she had a gastric sleeve on 9/12/17. Sepsis Screening completed    (  )Patient meets SIRS criteria. ( x )Patient does not meet SIRS criteria.       SIRS Criteria is achieved when two or more of the following are present   Temperature < 96.8°F (36°C) or > 100.9°F (38.3°C)   Heart Rate > 90 beats per minute   Respiratory Rate > 20 breaths per minute   WBC count > 12,000 or <4,000 or > 10% bands

## 2017-09-20 NOTE — ED NOTES
Pt assisted to restroom via wheelchair. Pt tolerated well. Pt resting in stretcher in NAD at this time. Call light within reach.

## 2017-09-20 NOTE — TELEPHONE ENCOUNTER
Adina please advise:    During transportation to the ED patient requested a prescription for pain medication, specifically Norco instead of Percocet. Assessment not performed of pain level due to ED transportation.

## 2017-09-20 NOTE — TELEPHONE ENCOUNTER
Dr. Soto Sayres please advise:  9/12/17- Sleeve Gastrectomy    Patient walked in to office to request a Rx for pain and c/o to front office of dehydration. Upon assessment pt was noted to be fatigued, winded and diaphoresis noted. Patient requested an escort to the ER. Patient transported via wheelchair to Emergency Department.

## 2017-09-20 NOTE — ED PROVIDER NOTES
[address]  EMERGENCY DEPARTMENT HISTORY AND PHYSICAL EXAM       Date: 2017   Patient Name: Sonia Hemphill   YOB: 1960  Medical Record Number: [id number]    History of Presenting Illness     Chief Complaint   Patient presents with    Shortness of Breath    Dizziness        History Provided By:  patient    Additional History: 3:28 PM   Sonia Hemphill is a 61 y.o. female who presents to the emergency department C/O SOB onset earlier today worse when pt gets up. Associated sx's include palpitations, dizziness, diarrhea and generalized weakness. Pt had gastric sleeve surgery one week ago, 17. PSHx hysterectomy and cholecystectomy. Pt denies use of blood thinners, nausea, vomiting, CP, leg swelling or any other sx's or complaints.       Primary Care Provider: Jonny España MD   Specialist:    Past History     Past Medical History:   Past Medical History:   Diagnosis Date    Adverse effect of anesthesia     small airway, difficult intubation, pt requesting anesthesia consult    Asthma     seasonal    Borderline diabetes     Carpal tunnel syndrome     Chronic pain     lower back, arms and legs    Depression     Diabetes ([address]     diet control and exercise    GERD (gastroesophageal reflux disease)     H/O seasonal allergies     Hypertension     Morbid obesity ([address]     Morbid obesity with BMI of 40.0-44.9, adult ([address]     Multiple sclerosis ([address]     Osteoarthritis of left knee 2016    Osteoarthritis of right knee     Overactive bladder     Rheumatoid arthritis ([address]     Thyroid cancer ([address]         Past Surgical History:   Past Surgical History:   Procedure Laterality Date    HX  SECTION   &     HX HERNIA REPAIR      umbilical x2    HX HYSTERECTOMY      RYNE    HX [facility]OMY      - [facility]    HX ORTHOPAEDIC Bilateral     partial knee replacement    HX OTHER SURGICAL      fatty tumor removal left arm    HX THYROIDECTOMY      2014- [facility]    HX TONSILLECTOMY          Family History:   History reviewed. No pertinent family history. Social History:   Social History   Substance Use Topics    Smoking status: Former Smoker     Quit date: 3/9/2002    Smokeless tobacco: Never Used    Alcohol use Yes      Comment: 2 alcohol beverages per month        Allergies: Allergies   Allergen Reactions    Shellfish Derived Shortness of Breath and Swelling    Adhesive Tape-Silicones Other (comments)     Skin peels and blister    Sulfa (Sulfonamide Antibiotics) Itching        Review of Systems   Review of Systems   Respiratory: Positive for shortness of breath. Cardiovascular: Positive for palpitations. Negative for chest pain and leg swelling. Gastrointestinal: Positive for diarrhea. Negative for nausea and vomiting. Neurological: Positive for dizziness and weakness. All other systems reviewed and are negative. Physical Exam  Vitals:    09/20/17 1802 09/20/17 1830 09/20/17 1900 09/20/17 1930   BP: 106/50 111/60 117/41 148/53   Pulse: 82 85 80 80   Resp: 21 16 18 15   Temp:       SpO2: 100%  99% 100%   Weight:       Height:           Physical Exam   Nursing note and vitals reviewed. Vital signs and nursing notes reviewed    CONSTITUTIONAL: Alert, in no apparent distress; Overweight middle aged. Anxious   HEAD:  Normocephalic, atraumatic  EYES: PERRL; EOM's intact. ENTM: Nose: no rhinorrhea; Throat: no erythema or exudate, mucous membranes moist  Neck:  No JVD, supple without lymphadenopathy  RESP: Chest clear, equal breath sounds. Tachypnic  CV: tachy; No murmurs, gallops or rubs. GI: Normal bowel sounds, abdomen soft and non-tender. No masses or organomegaly. : No costo-vertebral angle tenderness. BACK:  Non-tender  UPPER EXT:  Normal inspection  LOWER EXT: No edema, no calf tenderness. Distal pulses intact.   NEURO: CN intact, reflexes 2/4 and sym, strength 5/5 and sym, sensation intact. SKIN: No rashes; Normal for age and stage. Surgical scars on abd and well healing post op scar on abd. PSYCH:  Alert and oriented, normal affect. Diagnostic Study Results     Labs -      Recent Results (from the past 12 hour(s))   EKG, 12 LEAD, INITIAL    Collection Time: 09/20/17  3:33 PM   Result Value Ref Range    Ventricular Rate 105 BPM    Atrial Rate 105 BPM    P-R Interval 140 ms    QRS Duration 74 ms    Q-T Interval 348 ms    QTC Calculation (Bezet) 459 ms    Calculated P Axis 58 degrees    Calculated R Axis 22 degrees    Calculated T Axis 61 degrees    Diagnosis       Sinus tachycardia  Otherwise normal ECG  When compared with ECG of 06-SEP-2017 12:41,  No significant change was found     CBC WITH AUTOMATED DIFF    Collection Time: 09/20/17  4:11 PM   Result Value Ref Range    WBC 6.9 4.6 - 13.2 K/uL    RBC 5.38 (H) 4.20 - 5.30 M/uL    HGB 13.1 12.0 - 16.0 g/dL    HCT 37.6 35.0 - 45.0 %    MCV 69.9 (L) 74.0 - 97.0 FL    MCH 24.3 24.0 - 34.0 PG    MCHC 34.8 31.0 - 37.0 g/dL    RDW 16.7 (H) 11.6 - 14.5 %    PLATELET 849 594 - 068 K/uL    MPV 10.4 9.2 - 11.8 FL    NEUTROPHILS 58 40 - 73 %    LYMPHOCYTES 26 21 - 52 %    MONOCYTES 8 3 - 10 %    EOSINOPHILS 7 (H) 0 - 5 %    BASOPHILS 1 0 - 2 %    ABS. NEUTROPHILS 4.1 1.8 - 8.0 K/UL    ABS. LYMPHOCYTES 1.8 0.9 - 3.6 K/UL    ABS. MONOCYTES 0.5 0.05 - 1.2 K/UL    ABS. EOSINOPHILS 0.5 (H) 0.0 - 0.4 K/UL    ABS.  BASOPHILS 0.1 (H) 0.0 - 0.06 K/UL    DF AUTOMATED     METABOLIC PANEL, COMPREHENSIVE    Collection Time: 09/20/17  4:11 PM   Result Value Ref Range    Sodium 141 136 - 145 mmol/L    Potassium 3.6 3.5 - 5.5 mmol/L    Chloride 98 (L) 100 - 108 mmol/L    CO2 26 21 - 32 mmol/L    Anion gap 17 3.0 - 18 mmol/L    Glucose 87 74 - 99 mg/dL    BUN 14 7.0 - 18 MG/DL    Creatinine 0.75 0.6 - 1.3 MG/DL    BUN/Creatinine ratio 19 12 - 20      GFR est AA >60 >60 ml/min/1.73m2    GFR est non-AA >60 >60 ml/min/1.73m2    Calcium 9.5 8.5 - 10.1 MG/DL Bilirubin, total 0.8 0.2 - 1.0 MG/DL    ALT (SGPT) 35 13 - 56 U/L    AST (SGOT) 33 15 - 37 U/L    Alk. phosphatase 71 45 - 117 U/L    Protein, total 8.5 (H) 6.4 - 8.2 g/dL    Albumin 3.7 3.4 - 5.0 g/dL    Globulin 4.8 (H) 2.0 - 4.0 g/dL    A-G Ratio 0.8 0.8 - 1.7     MAGNESIUM    Collection Time: 09/20/17  4:11 PM   Result Value Ref Range    Magnesium 2.2 1.6 - 2.6 mg/dL   NT-PRO BNP    Collection Time: 09/20/17  4:11 PM   Result Value Ref Range    NT pro-BNP 9 0 - 900 PG/ML   CARDIAC PANEL,(CK, CKMB & TROPONIN)    Collection Time: 09/20/17  4:11 PM   Result Value Ref Range    CK 69 26 - 192 U/L    CK - MB 1.0 <3.6 ng/ml    CK-MB Index 1.4 0.0 - 4.0 %    Troponin-I, Qt. <0.02 0.00 - 0.06 NG/ML   URINALYSIS W/ RFLX MICROSCOPIC    Collection Time: 09/20/17  5:30 PM   Result Value Ref Range    Color YELLOW      Appearance CLEAR      Specific gravity >1.030 (H) 1.005 - 1.030    pH (UA) 5.5 5.0 - 8.0      Protein NEGATIVE  NEG mg/dL    Glucose NEGATIVE  NEG mg/dL    Ketone 40 (A) NEG mg/dL    Bilirubin NEGATIVE  NEG      Blood NEGATIVE  NEG      Urobilinogen 1.0 0.2 - 1.0 EU/dL    Nitrites NEGATIVE  NEG      Leukocyte Esterase NEGATIVE  NEG         Radiologic Studies -  The following have been ordered and reviewed:  CTA CHEST W OR W WO CONT   Final Result   1. Respiratory motion limits evaluation of the segmental vasculature within the  left lower lobe. Otherwise, no evidence of pulmonary thromboembolism.     2. No acute cardiopulmonary findings. As read by the radiologist.    XR CHEST PORT   Final Result   Mildly underexpanded lungs without radiographic evidence of acute abnormality. As read by the radiologist.      Medical Decision Making   I am the first provider for this patient. I reviewed the vital signs, available nursing notes, past medical history, past surgical history, family history and social history. Ddx: PE, CHF, ACS, and anemia    Vital Signs-Reviewed the patient's vital signs.    Patient Vitals for the past 12 hrs:   Temp Pulse Resp BP SpO2   09/20/17 1930 - 80 15 148/53 100 %   09/20/17 1900 - 80 18 117/41 99 %   09/20/17 1830 - 85 16 111/60 -   09/20/17 1802 - 82 21 106/50 100 %   09/20/17 1737 - 99 (!) 32 (!) 140/95 100 %   09/20/17 1715 - 91 24 - 100 %   09/20/17 1703 - 94 17 (!) 118/94 100 %   09/20/17 1630 - 89 26 - 100 %   09/20/17 1615 - 91 18 - 99 %   09/20/17 1600 - 92 19 - 97 %   09/20/17 1545 - 96 19 - 100 %   09/20/17 1530 - (!) 110 21 (!) 161/96 100 %   09/20/17 1521 97.3 °F (36.3 °C) (!) 107 20 162/62 100 %       Pulse Oximetry Analysis - Normal 100% on RA     Cardiac Monitor:   Rate: 105 bpm  Rhythm: Sinus Tachycardia      EKG interpretation: (Preliminary)  Rhythm: sinus tach. Rate (approx.): 105 bpm; normal STc   EKG read by Jeaneth Fair MD at 15:33    Procedures:   Procedures    ED Course:  3:28 PM  Initial assessment performed. The patients presenting problems have been discussed, and they are in agreement with the care plan formulated and outlined with them. I have encouraged them to ask questions as they arise throughout their visit.    7:09 PM Pt is feeling much better. HR is 80 and BP is stable. Will give second liters of fluid and then discharge. Medications Given in the ED:  Medications   sodium chloride 0.9 % bolus infusion 1,000 mL (0 mL IntraVENous IV Completed 9/20/17 1837)   iopamidol (ISOVUE-370) 76 % injection 100 mL (100 mL IntraVENous Given 9/20/17 1646)   sodium chloride 0.9 % bolus infusion 1,000 mL (0 mL IntraVENous IV Completed 9/20/17 1934)       Discharge Note:  7:55 PM  Pt has been reexamined. Patient has no new complaints, changes, or physical findings. Care plan outlined and precautions discussed. Results were reviewed with the patient. All medications were reviewed with the patient; will d/c home . All of pt's questions and concerns were addressed.  Patient was instructed and agrees to follow up with PCP, as well as to return to the ED upon further deterioration. Patient is ready to go home. Diagnosis   Clinical Impression:   1. Dyspnea on exertion    2. Generalized weakness         Discussion:  Pt presented with SOB when walking. She has gastric sleeve surgery last week and felt weak at home. She was concerned for PE, did CTA. Further labs were normal. Gave her IV fluids and her HR and vitals improved. She will be discharged to follow up with PCP. Follow-up Information     Follow up With Details Comments [address], [place] Schedule an appointment as soon as possible for a visit in 2 days for PCP follow up [address] [place] [address] EMERGENCY DEPT  As needed, If symptoms worsen [address]  [address] [address]  [phone]          Current Discharge Medication List          _______________________________   Attestations: This note is prepared by Franny Holland , acting as a Scribe for Jennifer Mario MD on 3:28 PM on 9/20/2017 . Jennifer Mario MD: The scribe's documentation has been prepared under my direction and personally reviewed by me in its entirety.   _______________________________

## 2017-09-20 NOTE — ED NOTES
Rounded on pt. IVF infusing w/o difficulty.  at bedside. Call light within reach. No needs verbalized at this time.

## 2017-09-20 NOTE — DISCHARGE INSTRUCTIONS
Weakness: Care Instructions  Your Care Instructions  Weakness is a lack of physical or muscle strength. You may feel that you need to make extra effort to move your arms, legs, or other muscles. Generalized weakness means that you feel weak in most areas of your body. Another type of weakness may affect just one muscle or group of muscles. You may feel weak and tired after you have done too much activity, such as taking an extra-long hike. This is not a serious problem. It often goes away on its own. Feeling weak can also be caused by medical conditions like thyroid problems, depression, or a virus. Sometimes the cause can be serious. Your doctor may want to do more tests to try to find the cause of the weakness. The doctor has checked you carefully, but problems can develop later. If you notice any problems or new symptoms, get medical treatment right away. Follow-up care is a key part of your treatment and safety. Be sure to make and go to all appointments, and call your doctor if you are having problems. It's also a good idea to know your test results and keep a list of the medicines you take. How can you care for yourself at home? · Rest when you feel tired. · Be safe with medicines. If your doctor prescribed medicine, take it exactly as prescribed. Call your doctor if you think you are having a problem with your medicine. You will get more details on the specific medicines your doctor prescribes. · Do not skip meals. Eating a balanced diet may increase your energy level. · Get some physical activity every day, but do not get too tired. When should you call for help? Call your doctor now or seek immediate medical care if:  · You have new or worse weakness. · You are dizzy or lightheaded, or you feel like you may faint. Watch closely for changes in your health, and be sure to contact your doctor if:  · You do not get better as expected. Where can you learn more?   Go to http://melissa.info/. Enter [id number] [id number] [id number] in the search box to learn more about \"Weakness: Care Instructions. \"  Current as of: March 20, 2017  Content Version: 11.3  © 6051-4353 [employer]. Care instructions adapted under license by [employer] (which disclaims liability or warranty for this information). If you have questions about a medical condition or this instruction, always ask your healthcare professional. [address] any warranty or liability for your use of this information. Shortness of Breath: Care Instructions  Your Care Instructions  Shortness of breath has many causes. Sometimes conditions such as anxiety can lead to shortness of breath. Some people get mild shortness of breath when they exercise. Trouble breathing also can be a symptom of a serious problem, such as asthma, lung disease, emphysema, heart problems, and pneumonia. If your shortness of breath continues, you may need tests and treatment. Watch for any changes in your breathing and other symptoms. Follow-up care is a key part of your treatment and safety. Be sure to make and go to all appointments, and call your doctor if you are having problems. Its also a good idea to know your test results and keep a list of the medicines you take. How can you care for yourself at home? · Do not smoke or allow others to smoke around you. If you need help quitting, talk to your doctor about stop-smoking programs and medicines. These can increase your chances of quitting for good. · Get plenty of rest and sleep. · Take your medicines exactly as prescribed. Call your doctor if you think you are having a problem with your medicine. · Find healthy ways to deal with stress. ¨ Exercise daily. ¨ Get plenty of sleep. ¨ Eat regularly and well. When should you call for help? Call 911 anytime you think you may need emergency care.  For example, call if:  · You have severe shortness of breath. · You have symptoms of a heart attack. These may include:  ¨ Chest pain or pressure, or a strange feeling in the chest.  ¨ Sweating. ¨ Shortness of breath. ¨ Nausea or vomiting. ¨ Pain, pressure, or a strange feeling in the back, neck, jaw, or upper belly or in one or both shoulders or arms. ¨ Lightheadedness or sudden weakness. ¨ A fast or irregular heartbeat. After you call 911, the  may tell you to chew 1 adult-strength or 2 to 4 low-dose aspirin. Wait for an ambulance. Do not try to drive yourself. Call your doctor now or seek immediate medical care if:  · Your shortness of breath gets worse or you start to wheeze. Wheezing is a high-pitched sound when you breathe. · You wake up at night out of breath or have to prop your head up on several pillows to breathe. · You are short of breath after only light activity or while at rest.  Watch closely for changes in your health, and be sure to contact your doctor if:  · You do not get better over the next 1 to 2 days. Where can you learn more? Go to http://astrid-jesusita.info/. Enter S780 in the search box to learn more about \"Shortness of Breath: Care Instructions. \"  Current as of: March 25, 2017  Content Version: 11.3  © 4267-5728 [employer]. Care instructions adapted under license by [employer] (which disclaims liability or warranty for this information). If you have questions about a medical condition or this instruction, always ask your healthcare professional. [place] [address] any warranty or liability for your use of this information.

## 2017-09-21 NOTE — ED NOTES
Caroline Aguilar was discharged in good and improved condition. The patient's diagnosis, condition and treatment were explained to patient and written aftercare instructions were given. The patient verbalized good understanding. Patient armband removed and both labels and armband were placed in shred bin. Patient left ER ambulatory with steady gait in no acute distress. 0 prescriptions provided.

## 2017-09-23 LAB
ATRIAL RATE: 105 BPM
CALCULATED P AXIS, ECG09: 58 DEGREES
CALCULATED R AXIS, ECG10: 22 DEGREES
CALCULATED T AXIS, ECG11: 61 DEGREES
DIAGNOSIS, 93000: NORMAL
P-R INTERVAL, ECG05: 140 MS
Q-T INTERVAL, ECG07: 348 MS
QRS DURATION, ECG06: 74 MS
QTC CALCULATION (BEZET), ECG08: 459 MS
VENTRICULAR RATE, ECG03: 105 BPM

## 2017-09-26 ENCOUNTER — OFFICE VISIT (OUTPATIENT)
Dept: SURGERY | Age: 57
End: 2017-09-26

## 2017-09-26 VITALS
HEIGHT: 67 IN | OXYGEN SATURATION: 100 % | SYSTOLIC BLOOD PRESSURE: 143 MMHG | BODY MASS INDEX: 37.07 KG/M2 | HEART RATE: 105 BPM | DIASTOLIC BLOOD PRESSURE: 63 MMHG | WEIGHT: 236.2 LBS

## 2017-09-26 DIAGNOSIS — K90.9 INTESTINAL MALABSORPTION, UNSPECIFIED TYPE: Primary | ICD-10-CM

## 2017-09-26 DIAGNOSIS — Z98.890 S/P GASTRIC SURGERY: ICD-10-CM

## 2017-09-26 NOTE — MR AVS SNAPSHOT
Visit Information Date & Time Provider Department Dept. Phone Encounter #  
 9/26/2017  2:30 PM Kishore Maxwell, [address] [facility] [place] [phone] [id number] Follow-up Instructions Return in about 2 weeks (around 10/10/2017) for Will meet with dietician at next visit as well as provider. Your Appointments 10/11/2017 10:15 AM  
Office Visit with Hanna Andrade MD  
[facility] [place] ([address]) Appt Note: 1 mo po  
 [address] [facility] [address]  
[phone]  
  
   
 [address] [place] [address]  
  
    
 10/11/2017 11:00 AM  
Office Visit with BHARTI DOMÍNGUEZ VISIT2 [facility] [place] Willett ([address]) Appt Note: 1 mo po  
 [address] [facility] [address]  
  
   
 [address] [place] [address] Upcoming Health Maintenance Date Due Hepatitis C Screening 1960 Pneumococcal 19-64 Highest Risk (1 of 3 - PCV13) 11/15/1978 DTaP/Tdap/Td series (1 - Tdap) 11/15/1980 PAP AKA CERVICAL CYTOLOGY 11/15/1980 BREAST CANCER SCRN MAMMOGRAM 11/15/2009 FOBT Q 1 YEAR AGE 50-75 11/15/2009 INFLUENZA AGE 9 TO ADULT 8/1/2017 Allergies as of 9/26/2017  Review Complete On: 9/26/2017 By: HADLEY Roldan Severity Noted Reaction Type Reactions Shellfish Derived High 03/09/2016   Systemic Shortness of Breath, Swelling Adhesive Tape-silicones  53/72/8302    Other (comments) Skin peels and blister Sulfa (Sulfonamide Antibiotics)  03/09/2016    Itching Current Immunizations  Never Reviewed No immunizations on file. Not reviewed this visit You Were Diagnosed With   
  
 Codes Comments Intestinal malabsorption, unspecified type    -  Primary ICD-10-CM: K90.9 ICD-9-CM: 579.9  S/P gastric surgery     ICD-10-CM: I46.164 
 ICD-9-CM: V45.89 Vitals BP Pulse Height(growth percentile) Weight(growth percentile) SpO2 BMI  
 143/63 (BP 1 Location: Left arm, BP Patient Position: Sitting) (!) 105 5' 7\" (1.702 m) 236 lb 3.2 oz (107.1 kg) 100% 36.99 kg/m2 OB Status Smoking Status Hysterectomy Former Smoker BMI and BSA Data Body Mass Index Body Surface Area  
 36.99 kg/m 2 2.25 m 2 Preferred Pharmacy Pharmacy Name Phone [address] [facility], [place] [address] [facility] [place] [address] Tali Florez [phone] Your Updated Medication List  
  
   
This list is accurate as of: 9/26/17  3:01 PM.  Always use your most recent med list.  
  
  
  
  
 baclofen 10 mg tablet Commonly known as:  LIORESAL Take 10 mg by mouth two (2) times a day. Indications: MS  
  
 cetirizine 10 mg tablet Commonly known as:  ZYRTEC Take 10 mg by mouth daily. cyclobenzaprine 10 mg tablet Commonly known as:  FLEXERIL Take 10 mg by mouth daily. cycloSPORINE 0.05 % ophthalmic emulsion Commonly known as:  RESTASIS Administer 1 Drop to both eyes two (2) times a day. dimethyl fumarate 240 mg Cpdr  
Take 240 mg by mouth two (2) times a day. DULoxetine 60 mg capsule Commonly known as:  CYMBALTA Take 60 mg by mouth nightly. Indications: ANXIETY WITH DEPRESSION, NEUROPATHIC PAIN  
  
 EPINEPHrine 0.3 mg/0.3 mL injection Commonly known as:  EPIPEN  
0.3 mg by IntraMUSCular route once as needed for Anaphylaxis (Iodine/Shellfish). Indications: ANAPHYLAXIS  
  
 gabapentin 300 mg capsule Commonly known as:  NEURONTIN Take 600 mg by mouth two (2) times a day. multivitamin tablet Commonly known as:  ONE A DAY Take 1 Tab by mouth daily. PEPCID AC 20 mg tablet Generic drug:  famotidine Take 20 mg by mouth nightly. Indications: gastroesophageal reflux disease SYNTHROID 112 mcg tablet Generic drug:  levothyroxine Take 112 mcg by mouth Daily (before breakfast). Indications: ADJUNCT TO SURGERY OR RADIOTHERAPY FOR THYROID CARCINOMA  
  
 telmisartan 40 mg tablet Commonly known as:  Kristyn Poag Take 40 mg by mouth daily. Indications: HYPERTENSION  
  
 TYLENOL EXTRA STRENGTH 500 mg tablet Generic drug:  acetaminophen Take 1,000 mg by mouth daily. XOLAIR SC  
by SubCUTAneous route every thirty (30) days. zolpidem 10 mg tablet Commonly known as:  AMBIEN Take 10 mg by mouth nightly as needed for Sleep. Follow-up Instructions Return in about 2 weeks (around 10/10/2017) for Will meet with dietician at next visit as well as provider. Patient Instructions Patient Instructions 1. Advance diet to the section in your book called weeks 3-4. Do not eat hard boiled eggs. 2. Remember hydration goals - minimum of 64 ounces of liquids per day (dehydration is the number one reason for hospital readmission). 3. Continue to monitor carbohydrate and protein intake you need a minimum of  Grams of protein daily- remember to keep your total carbohydrates to 50 grams or less per day for best results. 4. Continue to work towards exercise goals - 60-90 minutes, 5 times a week minimum of deliberate, aerobic exercise is the ultimate goal with strength training 2 times each week. Refer to [facility] for  information. 5. Remember to take vitamins as directed in your handbook. 6. Attend support group the 2nd Thursday of each month. 7. Use Miralax if you become constipated. 8. Call us at [phone] or email us through [place]" with questions,     concerns or worsening of condition, we have someone on call 24 hours a day. If you are unable to reach our office, you are to go to your Primary Care Physician or the Emergency Department. Supplement Resource Guide Importance of Protein: Maintains lean body mass, produces antibodies to fight off infections, heals wounds, minimizes hair loss, helps to give you energy, helps with satiety, and keeping you full between meals. Importance of Calcium: 
Needed for healthy bones and teeth, normal blood clotting, and nervous system functioning, higher risk of osteoporosis and bone disease with non-compliance. Importance of Multivitamins: Many functions. Supply you with extra nutrients that you may be missing from food. May lead to iron deficiency anemia, weakness, fatigue, and many other symptoms with non-compliance. Importance of B Vitamins: 
Important for red blood cell formation, metabolism, energy, and helps to maintain a healthy nervous system. Protein Supplement Find one you like now. Use immediately after surgery. Look for: 
35-50g protein each day from your protein supplement once you reach the progression diet. 0-3 g fat per serving 0-3 g sugar per serving Protein drinks should be split in separate dosages. Recommend: Lifelong 1 year + Calcium Supplement:  
 
Start taking within a month after surgery. Look for: Calcium Citrate Plus D (1500 mg per day) Recommend: Citracal 
 
 . Avoid chocolate chewable calcium. Can use chewable bariatric or [employer] brand or similar chewable. The body cannot absorb more than 500-600 mg of calcium at a time. Take for Life Multi-vitamin Supplement:   
 
Start immediately after surgery: any complete chewable, such as: [place]s Complete chewables. Avoid [place] sours or gummies. They lack iron and other important nutrients and also have added sugar. Continue with chewable vitamin or change to adult complete multivitamin one month after surgery. Menstruating women can take a prenatal vitamin. Make sure has at least 18 mg iron and 671-030 mcg folic acid Vitamin B12, B Complex Vitamin, and Biotin Start taking within a month after surgery. Vitamin B12:  1000 mcg of Vitamin B12 three times weekly Must take sublingually (meaning you take it under your tongue) or in a liquid drop form for easy absorption. B Complex Vitamin: Take a pill or liquid drop form once daily. Biotin: This vitamin can help prevent hair loss. Recommend 5mg  
(5000 mcg) a day Biotin is Optional  
 
 
 
 
 
 
  
Introducing [facility]! Marcelino Alexandra introduces [employer] patient portal. Now you can access parts of your medical record, email your doctor's office, and request medication refills online. 1. In your internet browser, go to [email or web address] 2. Click on the First Time User? Click Here link in the Sign In box. You will see the New Member Sign Up page. 3. Enter your [employer] Access Code exactly as it appears below. You will not need to use this code after youve completed the sign-up process. If you do not sign up before the expiration date, you must request a new code. · [employer] Access Code: Levine, Susan. \Hospital Has a New Name and Outlook.\"" Expires: 12/19/2017  5:02 PM 
 
4. Enter the last four digits of your Social Security Number (xxxx) and Date of Birth (mm/dd/yyyy) as indicated and click Submit. You will be taken to the next sign-up page. 5. Create a [employer] ID. This will be your [employer] login ID and cannot be changed, so think of one that is secure and easy to remember. 6. Create a [employer] password. You can change your password at any time. 7. Enter your Password Reset Question and Answer. This can be used at a later time if you forget your password. 8. Enter your e-mail address. You will receive e-mail notification when new information is available in [address] 9. Click Sign Up. You can now view and download portions of your medical record. 10. Click the Download Summary menu link to download a portable copy of your medical information.  
 
If you have questions, please visit the Frequently Asked Questions section of the [employer]. Remember, [employer] is NOT to be used for urgent needs. For medical emergencies, dial 911. Now available from your iPhone and Android! Please provide this summary of care documentation to your next provider. Your primary care clinician is listed as Luz Maria Dominique. If you have any questions after today's visit, please call [phone].

## 2017-09-26 NOTE — PATIENT INSTRUCTIONS
Patient Instructions      1. Advance diet to the section in your book called weeks 3-4. Do not eat hard boiled eggs. 2. Remember hydration goals - minimum of 64 ounces of liquids per day (dehydration is the number one reason for hospital readmission). 3. Continue to monitor carbohydrate and protein intake you need a minimum of  Grams of protein daily- remember to keep your total carbohydrates to 50 grams or less per day for best results. 4. Continue to work towards exercise goals - 60-90 minutes, 5 times a week minimum of deliberate, aerobic exercise is the ultimate goal with strength training 2 times each week. Refer to [employer] for  information. 5. Remember to take vitamins as directed in your handbook. 6. Attend support group the 2nd Thursday of each month. 7. Use Miralax if you become constipated. 8. Call us at [phone] or email us through [email or web address]" with questions,     concerns or worsening of condition, we have someone on call 24 hours a day. If you are unable to reach our office, you are to go to your Primary Care Physician or the Emergency Department. Supplement Resource Guide    Importance of Protein:   Maintains lean body mass, produces antibodies to fight off infections, heals wounds, minimizes hair loss, helps to give you energy, helps with satiety, and keeping you full between meals. Importance of Calcium:  Needed for healthy bones and teeth, normal blood clotting, and nervous system functioning, higher risk of osteoporosis and bone disease with non-compliance. Importance of Multivitamins: Many functions. Supply you with extra nutrients that you may be missing from food. May lead to iron deficiency anemia, weakness, fatigue, and many other symptoms with non-compliance.     Importance of B Vitamins:  Important for red blood cell formation, metabolism, energy, and helps to maintain a healthy nervous system. Protein Supplement  Find one you like now. Use immediately after surgery. Look for:  35-50g protein each day from your protein supplement once you reach the progression diet. 0-3 g fat per serving  0-3 g sugar per serving    Protein drinks should be split in separate dosages. Recommend: Lifelong  1 year + Calcium Supplement:     Start taking within a month after surgery. Look for: Calcium Citrate Plus D (1500 mg per day)  Recommend: Citracal     .            Avoid chocolate chewable calcium. Can use chewable bariatric or [employer]NC brand or similar chewable. The body cannot absorb more than 500-600 mg of calcium at a time. Take for Life Multi-vitamin Supplement:      Start immediately after surgery: any complete chewable, such as: [place]s Complete chewables. Avoid [place] sours or gummies. They lack iron and other important nutrients and also have added sugar. Continue with chewable vitamin or change to adult complete multivitamin one month after surgery. Menstruating women can take a prenatal vitamin. Make sure has at least 18 mg iron and 406-097 mcg folic acid   Vitamin G57, B Complex Vitamin, and Biotin  Start taking within a month after surgery. Vitamin B12:  1000 mcg of Vitamin B12 three times weekly    Must take sublingually (meaning you take it under your tongue) or in a liquid drop form for easy absorption. B Complex Vitamin: Take a pill or liquid drop form once daily. Biotin: This vitamin can help prevent hair loss.     Recommend 5mg   (5000 mcg) a day  Biotin is Optional

## 2017-10-11 ENCOUNTER — OFFICE VISIT (OUTPATIENT)
Dept: SURGERY | Age: 57
End: 2017-10-11

## 2017-10-11 VITALS — WEIGHT: 233 LBS | HEIGHT: 67 IN | BODY MASS INDEX: 36.57 KG/M2

## 2017-10-11 VITALS
HEIGHT: 67 IN | DIASTOLIC BLOOD PRESSURE: 73 MMHG | SYSTOLIC BLOOD PRESSURE: 143 MMHG | HEART RATE: 88 BPM | BODY MASS INDEX: 36.57 KG/M2 | WEIGHT: 233 LBS | OXYGEN SATURATION: 100 % | RESPIRATION RATE: 16 BRPM

## 2017-10-11 DIAGNOSIS — K90.9 INTESTINAL MALABSORPTION, UNSPECIFIED TYPE: Primary | ICD-10-CM

## 2017-10-11 DIAGNOSIS — Z98.890 S/P GASTRIC SURGERY: ICD-10-CM

## 2017-10-11 DIAGNOSIS — E66.01 MORBID OBESITY WITH BMI OF 40.0-44.9, ADULT (HCC): Primary | ICD-10-CM

## 2017-10-11 NOTE — MR AVS SNAPSHOT
Visit Information Date & Time Provider Department Dept. Phone Encounter #  
 10/11/2017 10:15 AM Jessica Tinoco MD [facility] [id number] Follow-up Instructions Return in about 1 month (around 11/11/2017). Your Appointments 11/9/2017  1:00 PM  
POST OP with Michele Moon NP [facility] [place] ([facility]) Appt Note: 2 mo po  
 [address] [place] [address]  
  
   
 [address] [place] [address] Upcoming Health Maintenance Date Due Hepatitis C Screening 1960 Pneumococcal 19-64 Highest Risk (1 of 3 - PCV13) 11/15/1978 DTaP/Tdap/Td series (1 - Tdap) 11/15/1980 PAP AKA CERVICAL CYTOLOGY 11/15/1980 BREAST CANCER SCRN MAMMOGRAM 11/15/2009 FOBT Q 1 YEAR AGE 50-75 11/15/2009 INFLUENZA AGE 9 TO ADULT 8/1/2017 Allergies as of 10/11/2017  Review Complete On: 10/11/2017 By: Barbara Childers Severity Noted Reaction Type Reactions Shellfish Derived High 03/09/2016   Systemic Shortness of Breath, Swelling Adhesive Tape-silicones  80/75/4670    Other (comments) Skin peels and blister Sulfa (Sulfonamide Antibiotics)  03/09/2016    Itching Current Immunizations  Never Reviewed No immunizations on file. Not reviewed this visit You Were Diagnosed With   
  
 Codes Comments Intestinal malabsorption, unspecified type    -  Primary ICD-10-CM: K90.9 ICD-9-CM: 579.9 S/P gastric surgery     ICD-10-CM: K86.811 ICD-9-CM: V45.89 Vitals BP Pulse Height(growth percentile) Weight(growth percentile) SpO2 BMI  
 143/73 (BP 1 Location: Left arm, BP Patient Position: Sitting) 88 5' 7\" (1.702 m) 233 lb (105.7 kg) 100% 36.49 kg/m2 OB Status Smoking Status Hysterectomy Former Smoker BMI and BSA Data Body Mass Index Body Surface Area 36.49 kg/m 2 2.24 m 2 Preferred Pharmacy Pharmacy Name Phone [address], [place] [address] [address] Tali Florez [phone] Your Updated Medication List  
  
   
This list is accurate as of: 10/11/17 11:26 AM.  Always use your most recent med list.  
  
  
  
  
 baclofen 10 mg tablet Commonly known as:  LIORESAL Take 10 mg by mouth two (2) times a day. Indications: MS  
  
 cetirizine 10 mg tablet Commonly known as:  ZYRTEC Take 10 mg by mouth daily. cyclobenzaprine 10 mg tablet Commonly known as:  FLEXERIL Take 10 mg by mouth daily. cycloSPORINE 0.05 % ophthalmic emulsion Commonly known as:  RESTASIS Administer 1 Drop to both eyes two (2) times a day. dimethyl fumarate 240 mg Cpdr  
Take 240 mg by mouth two (2) times a day. DULoxetine 60 mg capsule Commonly known as:  CYMBALTA Take 60 mg by mouth nightly. Indications: ANXIETY WITH DEPRESSION, NEUROPATHIC PAIN  
  
 EPINEPHrine 0.3 mg/0.3 mL injection Commonly known as:  EPIPEN  
0.3 mg by IntraMUSCular route once as needed for Anaphylaxis (Iodine/Shellfish). Indications: ANAPHYLAXIS  
  
 gabapentin 300 mg capsule Commonly known as:  NEURONTIN Take 600 mg by mouth two (2) times a day. multivitamin tablet Commonly known as:  ONE A DAY Take 1 Tab by mouth daily. PEPCID AC 20 mg tablet Generic drug:  famotidine Take 20 mg by mouth nightly. Indications: gastroesophageal reflux disease SYNTHROID 112 mcg tablet Generic drug:  levothyroxine Take 112 mcg by mouth Daily (before breakfast). Indications: ADJUNCT TO SURGERY OR RADIOTHERAPY FOR THYROID CARCINOMA  
  
 telmisartan 40 mg tablet Commonly known as:  Manoj Half Take 40 mg by mouth daily. Indications: HYPERTENSION  
  
 TYLENOL EXTRA STRENGTH 500 mg tablet Generic drug:  acetaminophen Take 1,000 mg by mouth daily.   
  
 K[place] VALENCIA  
 by SubCUTAneous route every thirty (30) days. zolpidem 10 mg tablet Commonly known as:  AMBIEN Take 10 mg by mouth nightly as needed for Sleep. Follow-up Instructions Return in about 1 month (around 11/11/2017). Patient Instructions Patient Instructions 1. Remember hydration goals - minimum of 64 ounces of liquids per day (dehydration is the number one reason for hospital readmission). 2. Continue to monitor carbohydrate and protein intake you need a minimum of  Grams of protein daily- remember to keep your total carbohydrates to 50 grams or less per day for best results. 3. Continue to work towards exercise goals - 60-90 minutes, 5 times a week minimum of deliberate, aerobic exercise is the ultimate goal with strength training 2 times each week. Refer to [employer] for  information. 4. Remember to take vitamins as directed in your handbook. 5. Attend support group the 2nd Thursday of each month. 6. Use Miralax if you become constipated. 7. Call us at [phone] or email us through [facility]" with questions,     concerns or worsening of condition, we have someone on call 24 hours a day. If you are unable to reach our office, you are to go to your Primary Care Physician or the Emergency Department. Supplement Resource Guide Importance of Protein:  
Maintains lean body mass, produces antibodies to fight off infections, heals wounds, minimizes hair loss, helps to give you energy, helps with satiety, and keeping you full between meals. Importance of Calcium: 
Needed for healthy bones and teeth, normal blood clotting, and nervous system functioning, higher risk of osteoporosis and bone disease with non-compliance. Importance of Multivitamins: Many functions.   Supply you with extra nutrients that you may be missing from food. May lead to iron deficiency anemia, weakness, fatigue, and many other symptoms with non-compliance. Importance of B Vitamins: 
Important for red blood cell formation, metabolism, energy, and helps to maintain a healthy nervous system. Protein Supplement Find one you like now. Use immediately after surgery. Look for: 
35-50g protein each day from your protein supplement once you reach the progression diet. 0-3 g fat per serving 0-3 g sugar per serving Protein drinks should be split in separate dosages. Recommend: Lifelong 1 year + Calcium Supplement:  
 
Start taking within a month after surgery. Look for: Calcium Citrate Plus D (1500 mg per day) Recommend: Citracal 
 
 . Avoid chocolate chewable calcium. Can use chewable bariatric or [employer]NC brand or similar chewable. The body cannot absorb more than 500-600 mg of calcium at a time. Take for Life Multi-vitamin Supplement:   
 
Start immediately after surgery: any complete chewable, such as: [place]s Complete chewables. Avoid [place] sours or gummies. They lack iron and other important nutrients and also have added sugar. Continue with chewable vitamin or change to adult complete multivitamin one month after surgery. Menstruating women can take a prenatal vitamin. Make sure has at least 18 mg iron and 207-476 mcg folic acid Vitamin B12, B Complex Vitamin, and Biotin Start taking within a month after surgery. Vitamin B12:  1000 mcg of Vitamin B12 three times weekly Must take sublingually (meaning you take it under your tongue) or in a liquid drop form for easy absorption. B Complex Vitamin: Take a pill or liquid drop form once daily. Biotin: This vitamin can help prevent hair loss. Recommend 5mg  
(5000 mcg) a day Biotin is Optional  
 
 
 
 
  
Body Mass Index: Care Instructions Your Care Instructions Body mass index (BMI) can help you see if your weight is raising your risk for health problems. It uses a formula to compare how much you weigh with how tall you are. · A BMI lower than 18.5 is considered underweight. · A BMI between 18.5 and 24.9 is considered healthy. · A BMI between 25 and 29.9 is considered overweight. A BMI of 30 or higher is considered obese. If your BMI is in the normal range, it means that you have a lower risk for weight-related health problems. If your BMI is in the overweight or obese range, you may be at increased risk for weight-related health problems, such as high blood pressure, heart disease, stroke, arthritis or joint pain, and diabetes. If your BMI is in the underweight range, you may be at increased risk for health problems such as fatigue, lower protection (immunity) against illness, muscle loss, bone loss, hair loss, and hormone problems. BMI is just one measure of your risk for weight-related health problems. You may be at higher risk for health problems if you are not active, you eat an unhealthy diet, or you drink too much alcohol or use tobacco products. Follow-up care is a key part of your treatment and safety. Be sure to make and go to all appointments, and call your doctor if you are having problems. It's also a good idea to know your test results and keep a list of the medicines you take. How can you care for yourself at home? · Practice healthy eating habits. This includes eating plenty of fruits, vegetables, whole grains, lean protein, and low-fat dairy. · If your doctor recommends it, get more exercise. Walking is a good choice. Bit by bit, increase the amount you walk every day. Try for at least 30 minutes on most days of the week. · Do not smoke. Smoking can increase your risk for health problems. If you need help quitting, talk to your doctor about stop-smoking programs and medicines. These can increase your chances of quitting for good. · Limit alcohol to 2 drinks a day for men and 1 drink a day for women.  Too much alcohol can cause health problems. If you have a BMI higher than 25 · Your doctor may do other tests to check your risk for weight-related health problems. This may include measuring the distance around your waist. A waist measurement of more than 40 inches in men or 35 inches in women can increase the risk of weight-related health problems. · Talk with your doctor about steps you can take to stay healthy or improve your health. You may need to make lifestyle changes to lose weight and stay healthy, such as changing your diet and getting regular exercise. If you have a BMI lower than 18.5 · Your doctor may do other tests to check your risk for health problems. · Talk with your doctor about steps you can take to stay healthy or improve your health. You may need to make lifestyle changes to gain or maintain weight and stay healthy, such as getting more healthy foods in your diet and doing exercises to build muscle. Where can you learn more? Go to http://astrid-jesusita.info/. Enter S176 in the search box to learn more about \"Body Mass Index: Care Instructions. \" Current as of: January 23, 2017 Content Version: 11.3 © 4850-4103 [employer]. Care instructions adapted under license by [employer] (which disclaims liability or warranty for this information). If you have questions about a medical condition or this instruction, always ask your healthcare professional. [address] any warranty or liability for your use of this information. Introducing [facility]! Man [employer] introduces Veebox patient portal. Now you can access parts of your medical record, email your doctor's office, and request medication refills online. 1. In your internet browser, go to [email or web address] 2. Click on the First Time User? Click Here link in the Sign In box. You will see the New Member Sign Up page. 3. Enter your DLS Access Code exactly as it appears below. You will not need to use this code after youve completed the sign-up process. If you do not sign up before the expiration date, you must request a new code. · DLS Access Code: [facility] Expires: 12/19/2017  5:02 PM 
 
4. Enter the last four digits of your Social Security Number (xxxx) and Date of Birth (mm/dd/yyyy) as indicated and click Submit. You will be taken to the next sign-up page. 5. Create a Simple.TVt ID. This will be your DLS login ID and cannot be changed, so think of one that is secure and easy to remember. 6. Create a DLS password. You can change your password at any time. 7. Enter your Password Reset Question and Answer. This can be used at a later time if you forget your password. 8. Enter your e-mail address. You will receive e-mail notification when new information is available in [facility] 9. Click Sign Up. You can now view and download portions of your medical record. 10. Click the Download Summary menu link to download a portable copy of your medical information. If you have questions, please visit the Frequently Asked Questions section of the DLS website. Remember, DLS is NOT to be used for urgent needs. For medical emergencies, dial 911. Now available from your iPhone and Android! Please provide this summary of care documentation to your next provider. Your primary care clinician is listed as Luz Maria Dominique. If you have any questions after today's visit, please call [phone].

## 2017-10-11 NOTE — PATIENT INSTRUCTIONS
Patient Instructions      1. Remember hydration goals - minimum of 64 ounces of liquids per day (dehydration is the number one reason for hospital readmission). 2. Continue to monitor carbohydrate and protein intake you need a minimum of  Grams of protein daily- remember to keep your total carbohydrates to 50 grams or less per day for best results. 3. Continue to work towards exercise goals - 60-90 minutes, 5 times a week minimum of deliberate, aerobic exercise is the ultimate goal with strength training 2 times each week. Refer to [employer] for  information. 4. Remember to take vitamins as directed in your handbook. 5. Attend support group the 2nd Thursday of each month. 6. Use Miralax if you become constipated. 7. Call us at [phone] or email us through [email or web address]" with questions,     concerns or worsening of condition, we have someone on call 24 hours a day. If you are unable to reach our office, you are to go to your Primary Care Physician or the Emergency Department. Supplement Resource Guide    Importance of Protein:   Maintains lean body mass, produces antibodies to fight off infections, heals wounds, minimizes hair loss, helps to give you energy, helps with satiety, and keeping you full between meals. Importance of Calcium:  Needed for healthy bones and teeth, normal blood clotting, and nervous system functioning, higher risk of osteoporosis and bone disease with non-compliance. Importance of Multivitamins: Many functions. Supply you with extra nutrients that you may be missing from food. May lead to iron deficiency anemia, weakness, fatigue, and many other symptoms with non-compliance. Importance of B Vitamins:  Important for red blood cell formation, metabolism, energy, and helps to maintain a healthy nervous system. Protein Supplement  Find one you like now. Use immediately after surgery. Look for:  35-50g protein each day from your protein supplement once you reach the progression diet. 0-3 g fat per serving  0-3 g sugar per serving    Protein drinks should be split in separate dosages. Recommend: Lifelong  1 year + Calcium Supplement:     Start taking within a month after surgery. Look for: Calcium Citrate Plus D (1500 mg per day)  Recommend: Citracal     .            Avoid chocolate chewable calcium. Can use chewable bariatric or [employer]NC brand or similar chewable. The body cannot absorb more than 500-600 mg of calcium at a time. Take for Life Multi-vitamin Supplement:      Start immediately after surgery: any complete chewable, such as: [place]s Complete chewables. Avoid [place] sours or gummies. They lack iron and other important nutrients and also have added sugar. Continue with chewable vitamin or change to adult complete multivitamin one month after surgery. Menstruating women can take a prenatal vitamin. Make sure has at least 18 mg iron and 722-866 mcg folic acid   Vitamin V00, B Complex Vitamin, and Biotin  Start taking within a month after surgery. Vitamin B12:  1000 mcg of Vitamin B12 three times weekly    Must take sublingually (meaning you take it under your tongue) or in a liquid drop form for easy absorption. B Complex Vitamin: Take a pill or liquid drop form once daily. Biotin: This vitamin can help prevent hair loss. Recommend 5mg   (5000 mcg) a day  Biotin is Optional              Body Mass Index: Care Instructions  Your Care Instructions    Body mass index (BMI) can help you see if your weight is raising your risk for health problems. It uses a formula to compare how much you weigh with how tall you are. · A BMI lower than 18.5 is considered underweight. · A BMI between 18.5 and 24.9 is considered healthy. · A BMI between 25 and 29.9 is considered overweight. A BMI of 30 or higher is considered obese.   If your BMI is in the normal range, it means that you have a lower risk for weight-related health problems. If your BMI is in the overweight or obese range, you may be at increased risk for weight-related health problems, such as high blood pressure, heart disease, stroke, arthritis or joint pain, and diabetes. If your BMI is in the underweight range, you may be at increased risk for health problems such as fatigue, lower protection (immunity) against illness, muscle loss, bone loss, hair loss, and hormone problems. BMI is just one measure of your risk for weight-related health problems. You may be at higher risk for health problems if you are not active, you eat an unhealthy diet, or you drink too much alcohol or use tobacco products. Follow-up care is a key part of your treatment and safety. Be sure to make and go to all appointments, and call your doctor if you are having problems. It's also a good idea to know your test results and keep a list of the medicines you take. How can you care for yourself at home? · Practice healthy eating habits. This includes eating plenty of fruits, vegetables, whole grains, lean protein, and low-fat dairy. · If your doctor recommends it, get more exercise. Walking is a good choice. Bit by bit, increase the amount you walk every day. Try for at least 30 minutes on most days of the week. · Do not smoke. Smoking can increase your risk for health problems. If you need help quitting, talk to your doctor about stop-smoking programs and medicines. These can increase your chances of quitting for good. · Limit alcohol to 2 drinks a day for men and 1 drink a day for women. Too much alcohol can cause health problems. If you have a BMI higher than 25  · Your doctor may do other tests to check your risk for weight-related health problems.  This may include measuring the distance around your waist. A waist measurement of more than 40 inches in men or 35 inches in women can increase the risk of weight-related health problems. · Talk with your doctor about steps you can take to stay healthy or improve your health. You may need to make lifestyle changes to lose weight and stay healthy, such as changing your diet and getting regular exercise. If you have a BMI lower than 18.5  · Your doctor may do other tests to check your risk for health problems. · Talk with your doctor about steps you can take to stay healthy or improve your health. You may need to make lifestyle changes to gain or maintain weight and stay healthy, such as getting more healthy foods in your diet and doing exercises to build muscle. Where can you learn more? Go to http://astrid-jesusita.info/. Enter S176 in the search box to learn more about \"Body Mass Index: Care Instructions. \"  Current as of: January 23, 2017  Content Version: 11.3  © 3285-1067 [employer]. Care instructions adapted under license by [employer] (which disclaims liability or warranty for this information). If you have questions about a medical condition or this instruction, always ask your healthcare professional. [address] any warranty or liability for your use of this information.

## 2017-10-11 NOTE — PROGRESS NOTES
Subjective:      William Butler is a 61 y.o. female is now 1 months status post laparoscopic sleeve gastrectomy. Doing well overall. She has lost a total of 25 pounds since surgery. Body mass index is 36.49 kg/(m^2). Has lost 21% of EBW. Currently on a soft food diet without difficulty, reports no issues and denies vomiting and abdominal pain. Taking in 48oz water daily. Sources of protein include chicken salad, protein powder, cheese. 0 min of activity 7 days a week.  has been hospitalized and she has been too busy to start exercising. Bowel movements are regular. The patient is not having any pain. . The patient is compliant with multivitamins.      Weight Loss Metrics 10/11/2017 10/11/2017 9/26/2017 9/20/2017 9/12/2017 8/28/2017 8/23/2017   Today's Wt 233 lb 233 lb 236 lb 3.2 oz 238 lb 265 lb 12.8 oz 258 lb 263 lb   BMI 36.49 kg/m2 36.49 kg/m2 36.99 kg/m2 37.28 kg/m2 45.98 kg/m2 40.71 kg/m2 41.5 kg/m2          Comorbidities:    Hypertension: improved, stopped taking meds  Diabetes: not applicable  Obstructive Sleep Apnea: not applicable  Hyperlipidemia: not applicable  Stress Urinary Incontinence: not applicable  Gastroesophageal Reflux: improved, protonix in AM and pepcid in PM  Weight related arthropathy:not applicable     Patient Active Problem List   Diagnosis Code    Osteoarthritis of left knee M17.12    Morbid obesity with BMI of 40.0-44.9, adult ([address] E66.01, Z68.41    Osteoarthritis of right knee M17.11    Borderline diabetes R73.03    Thyroid cancer ([address] C73    Rheumatoid arthritis ([address] M06.9    Morbid obesity ([address] E66.01    Hypertension I10    GERD (gastroesophageal reflux disease) K21.9    Asthma J45.909    Adverse effect of anesthesia T41.45XA    S/P gastric surgery Z98.890    Intestinal malabsorption K90.9        Past Medical History:   Diagnosis Date    Adverse effect of anesthesia     small airway, difficult intubation, pt requesting anesthesia consult    Asthma seasonal    Borderline diabetes     Carpal tunnel syndrome     Chronic pain     lower back, arms and legs    Depression     Diabetes (HCC)     diet control and exercise    GERD (gastroesophageal reflux disease)     H/O seasonal allergies     Hypertension     [facility]     Morbid obesity with BMI of 40.0-44.9, adult ([facility]     Multiple sclerosis ([facility]     Osteoarthritis of left knee 2016    Osteoarthritis of right knee     Overactive bladder     Rheumatoid arthritis (HCC)     Thyroid cancer ([facility] [facility]        Past Surgical History:   Procedure Laterality Date    HX  SECTION   &     HX HERNIA REPAIR      umbilical x2    HX HYSTERECTOMY      RYNE    HX LAP [facility]LECYSTECTOMY      [facility]    HX ORTHOPAEDIC Bilateral     partial knee replacement    HX OTHER SURGICAL      fatty tumor removal left arm    HX THYROIDECTOMY       [facility]    HX TONSILLECTOMY         Current Outpatient Prescriptions   Medication Sig Dispense Refill    famotidine (PEPCID AC) 20 mg tablet Take 20 mg by mouth nightly. Indications: gastroesophageal reflux disease      cetirizine (ZYRTEC) 10 mg tablet Take 10 mg by mouth daily.  levothyroxine (SYNTHROID) 112 mcg tablet Take 112 mcg by mouth Daily (before breakfast). Indications: ADJUNCT TO SURGERY OR RADIOTHERAPY FOR THYROID CARCINOMA      acetaminophen (TYLENOL EXTRA STRENGTH) 500 mg tablet Take 1,000 mg by mouth daily.  multivitamin (ONE A DAY) tablet Take 1 Tab by mouth daily.  OMALIZUMAB (XOLAIR SC) by SubCUTAneous route every thirty (30) days.  gabapentin (NEURONTIN) 300 mg capsule Take 600 mg by mouth two (2) times a day.  zolpidem (AMBIEN) 10 mg tablet Take 10 mg by mouth nightly as needed for Sleep.  cyclobenzaprine (FLEXERIL) 10 mg tablet Take 10 mg by mouth daily.  dimethyl fumarate 240 mg cpDR Take 240 mg by mouth two (2) times a day.       baclofen (LIORESAL) 10 mg tablet Take 10 mg by mouth two (2) times a day. Indications: MS      telmisartan (MICARDIS) 40 mg tablet Take 40 mg by mouth daily. Indications: HYPERTENSION      cycloSPORINE (RESTASIS) 0.05 % ophthalmic emulsion Administer 1 Drop to both eyes two (2) times a day.  EPINEPHrine (EPIPEN) 0.3 mg/0.3 mL (1:1,000) injection 0.3 mg by IntraMUSCular route once as needed for Anaphylaxis (Iodine/Shellfish). Indications: ANAPHYLAXIS      DULoxetine (CYMBALTA) 60 mg capsule Take 60 mg by mouth nightly. Indications: ANXIETY WITH DEPRESSION, NEUROPATHIC PAIN         Allergies   Allergen Reactions    Shellfish Derived Shortness of Breath and Swelling    Adhesive Tape-Silicones Other (comments)     Skin peels and blister    Sulfa (Sulfonamide Antibiotics) Itching         Objective:     Visit Vitals    /73 (BP 1 Location: Left arm, BP Patient Position: Sitting)    Pulse 88    Ht 5' 7\" (1.702 m)    Wt 105.7 kg (233 lb)    SpO2 100%    BMI 36.49 kg/m2       General:  alert, cooperative, no distress, appears stated age   Chest: lungs clear to auscultation, no accessory muscle use   Cor:   Regular rate and rhythm   Abdomen: soft, bowel sounds active, non-tender, no masses or organomegaly   Incisions:   healing well, no drainage, no erythema, no hernia, no seroma, no swelling, no dehiscence, incision well approximated       Assessment:   History of Morbid obesity, status post laparoscopic sleeve gastrectomy. Doing well postoperatively. Plan:     1. Use Tylenol if needed for any joint pain  2. Can stop probiotic    3. Advance diet to solid phase. Reminded to measure portions, continue high protein, low carbohydrate diet. Reminded to eat regularly, to eat slowly & not to drink with meals. Refer to the handbook given in class. 4. Patient has appt with dietician directly after this visit.   5. Continue multivitamin and add the additional vitamin supplementation (Ca, B complex, B12, D, all listed in handbook)  6. Continue current medications and follow up with PCP for management of regimen. 7. Continue cardio exercise and add resistance exercises. Discussed with patient. Minimum of 30 minutes of exercise daily. 8. Encouraged to attend support group   9. I have discussed this plan with patient and they verbalized understanding  10. Follow up in 1 months or sooner if patient has questions, concerns or worsening of condition, if unable to reach our office, patient should report to the ED. 6. Ms. Krysta Min has a reminder for a \"due or due soon\" health maintenance. I have asked that she contact her primary care provider for a follow-up on this health maintenance.

## 2017-10-11 NOTE — PROGRESS NOTES
Pt given one on one diet education. Appears to have a good understanding of the diet progression, food choices, and dietary/exercise habits for successful weight loss and nourishment one month after surgery. The  material included: post-op diet progression and portion sizes (including low fat, low sugar food recommendations and emphasis on protein foods and protein supplements), good beverage choices, reading a food label, vitamins/minerals required after weight loss surgery, and encouraging dietary and exercise habits that lead to weight loss success. Pt also received a restaurant card, which tells restaurants that the patient had a procedure that decreases the size of their stomach so the restaurant may let them order off the children's menu, the senior's menu, or a smaller portion for a reduced rate.      Karyle Euler

## 2017-11-16 ENCOUNTER — OFFICE VISIT (OUTPATIENT)
Dept: SURGERY | Age: 57
End: 2017-11-16

## 2017-11-16 VITALS
DIASTOLIC BLOOD PRESSURE: 65 MMHG | WEIGHT: 212.7 LBS | BODY MASS INDEX: 33.38 KG/M2 | HEIGHT: 67 IN | OXYGEN SATURATION: 100 % | HEART RATE: 95 BPM | SYSTOLIC BLOOD PRESSURE: 135 MMHG

## 2017-11-16 DIAGNOSIS — I10 ESSENTIAL HYPERTENSION: ICD-10-CM

## 2017-11-16 DIAGNOSIS — Z98.890 S/P GASTRIC SURGERY: ICD-10-CM

## 2017-11-16 DIAGNOSIS — K90.9 INTESTINAL MALABSORPTION, UNSPECIFIED TYPE: Primary | ICD-10-CM

## 2017-11-16 DIAGNOSIS — K21.9 GASTROESOPHAGEAL REFLUX DISEASE WITHOUT ESOPHAGITIS: ICD-10-CM

## 2017-11-16 RX ORDER — BENZONATATE 100 MG/1
100 CAPSULE ORAL
COMMUNITY
End: 2018-02-13

## 2017-11-16 RX ORDER — SUCRALFATE 1 G/1
1 TABLET ORAL 4 TIMES DAILY
Qty: 120 TAB | Refills: 1 | Status: SHIPPED | OUTPATIENT
Start: 2017-11-16 | End: 2018-02-13

## 2017-11-16 NOTE — PROGRESS NOTES
Subjective:      Esmer Wynne is a 61 y.o. female is now 2 months status post laparoscopic sleeve gastrectomy. Doing well overall. She has lost a total of 45 pounds since surgery. Body mass index is 33.31 kg/(m^2). Has lost 38% of EBW. Currently on a solid food diet without difficulty, reports bad reflux and denies vomiting, abdominal pain and diarrhea. Taking in 48-64 oz water daily. Sources of protein include chicken, eggs. Eating 3 meals each day. 0 min of activity 7 days a week. Had ER visit 11/12 for URI and dehydration. Improving with medications. Feeling better. Reports reflux worsenend taking Protonix in morning and pepcid twice a day. Feels 'burning' discomfort in epigastric are and lots of belching. Bowel movements are regular. The patient is not having any pain. . The patient is compliant with multivitamins, calcium, Vit D, B complex and B12 supplements.      Weight Loss Metrics 11/16/2017 10/11/2017 10/11/2017 9/26/2017 9/20/2017 9/12/2017 8/28/2017   Today's Wt 212 lb 11.2 oz 233 lb 233 lb 236 lb 3.2 oz 238 lb 265 lb 12.8 oz 258 lb   BMI 33.31 kg/m2 36.49 kg/m2 36.49 kg/m2 36.99 kg/m2 37.28 kg/m2 45.98 kg/m2 40.71 kg/m2          Comorbidities:    Hypertension: improved  Diabetes: resolved  Obstructive Sleep Apnea: not applicable  Hyperlipidemia: not applicable  Stress Urinary Incontinence: not applicable  Gastroesophageal Reflux: worsened  Weight related arthropathy:improved     Patient Active Problem List   Diagnosis Code    Osteoarthritis of left knee M17.12    Morbid obesity with BMI of 40.0-44.9, adult ([address] E66.01, Z68.41    Osteoarthritis of right knee M17.11    Borderline diabetes R73.03    Thyroid cancer ([address] C73    Rheumatoid arthritis ([address] M06.9    Morbid obesity ([address] E66.01    Hypertension I10    GERD (gastroesophageal reflux disease) K21.9    Asthma J45.909    Adverse effect of anesthesia T41.45XA    S/P gastric surgery Z98.890    Intestinal malabsorption K90.9 Past Medical History:   Diagnosis Date    Adverse effect of anesthesia     small airway, difficult intubation, pt requesting anesthesia consult    Asthma     seasonal    Borderline diabetes     Carpal tunnel syndrome     Chronic pain     lower back, arms and legs    Depression     Diabetes (HCC)     diet control and exercise    GERD (gastroesophageal reflux disease)     H/O seasonal allergies     Hypertension     [facility]bid [facility]     Morbid obesity with BMI of 40.0-44.9, adult ([facility]     Multiple sclerosis ([facility]     Osteoarthritis of left knee 2016    Osteoarthritis of right knee     Overactive bladder     Rheumatoid arthritis (HCC)     Thyroid cancer ([facility] [facility]        Past Surgical History:   Procedure Laterality Date    HX  SECTION   &     HX HERNIA REPAIR      umbilical x2    HX HYSTERECTOMY      RYNE    HX LAP CHOLECYSTECTOMY      - [facility]    HX ORTHOPAEDIC Bilateral     partial knee replacement    HX OTHER SURGICAL      fatty tumor removal left arm    [facility]      -  [facility]    HX TONSILLECTOMY         Current Outpatient Prescriptions   Medication Sig Dispense Refill    famotidine (PEPCID AC) 20 mg tablet Take 20 mg by mouth nightly. Indications: gastroesophageal reflux disease      cetirizine (ZYRTEC) 10 mg tablet Take 10 mg by mouth daily.  levothyroxine (SYNTHROID) 112 mcg tablet Take 112 mcg by mouth Daily (before breakfast). Indications: ADJUNCT TO SURGERY OR RADIOTHERAPY FOR THYROID CARCINOMA      acetaminophen (TYLENOL EXTRA STRENGTH) 500 mg tablet Take 1,000 mg by mouth daily.  multivitamin (ONE A DAY) tablet Take 1 Tab by mouth daily.  OMALIZUMAB (XOLAIR SC) by SubCUTAneous route every thirty (30) days.  gabapentin (NEURONTIN) 300 mg capsule Take 600 mg by mouth two (2) times a day.  zolpidem (AMBIEN) 10 mg tablet Take 10 mg by mouth nightly as needed for Sleep.       cyclobenzaprine (FLEXERIL) 10 mg tablet Take 10 mg by mouth daily.  dimethyl fumarate 240 mg cpDR Take 240 mg by mouth two (2) times a day.  baclofen (LIORESAL) 10 mg tablet Take 10 mg by mouth two (2) times a day. Indications: MS      telmisartan (MICARDIS) 40 mg tablet Take 40 mg by mouth daily. Indications: HYPERTENSION      cycloSPORINE (RESTASIS) 0.05 % ophthalmic emulsion Administer 1 Drop to both eyes two (2) times a day.  EPINEPHrine (EPIPEN) 0.3 mg/0.3 mL (1:1,000) injection 0.3 mg by IntraMUSCular route once as needed for Anaphylaxis (Iodine/Shellfish). Indications: ANAPHYLAXIS      DULoxetine (CYMBALTA) 60 mg capsule Take 60 mg by mouth nightly.  Indications: ANXIETY WITH DEPRESSION, NEUROPATHIC PAIN         Allergies   Allergen Reactions    Shellfish Derived Shortness of Breath and Swelling    Adhesive Tape-Silicones Other (comments)     Skin peels and blister    Sulfa (Sulfonamide Antibiotics) Itching         Review of Symptoms:       General - No history or complaints of unexpected fever or chills  Head/Neck - No history or complaints of headache or dizziness  Cardiac - No history or complaints of chest pain, palpitations, or shortness of breath  Pulmonary - No history or complaints of shortness of breath or productive cough  Gastrointestinal - as noted above  Genitourinary - No history or complaints of hematuria/dysuria or renal lithiasis  Musculoskeletal - No history or complaints of joint  muscular weakness  Hematologic - No history of any bleeding episodes  Neurologic - No history or complaints of  migraine headaches or neurologic symptoms      Objective:     Visit Vitals    Ht 5' 7\" (1.702 m)    Wt 96.5 kg (212 lb 11.2 oz)    BMI 33.31 kg/m2       General:  alert, cooperative, no distress, appears stated age   Chest: lungs clear to auscultation, breath sounds equal and symmetric, no rhonchi, rales or wheezes, no accessory muscle use   Cor:   Regular rate and rhythm, S1S2 present or without murmur or extra heart sounds   Abdomen: soft, bowel sounds active, non-tender, no masses or organomegaly   Incisions:   healing well, no drainage, no erythema, no hernia, no seroma, no swelling, no dehiscence, incision well approximated       Assessment:   History of Morbid obesity, status post laparoscopic sleeve gastrectomy. Doing well postoperatively. HTN - only one 1 Rx  URI with decreased intake - reolved  GERD - worsened    Plan:     1. Increase activity to the goal of 30 minutes daily   2. Add carafate for GERD sxs, continue pepcid BID, increase PPI to BID for 3 days then resume daily  3. Call office if sxs not improving. May have to consider UGI if persists. 4. Reminded to measure portions, continue high protein, low carbohydrate diet. Reminded to eat regularly, to eat slowly & not to drink with meals. 5. Continue vitamin supplementation  6. Continue current medications and follow up with PCP for management of regimen. 7. Continue cardio exercise and resistance exercises. 60-90 min aerobic exercise 5 times a week and strength training 2 days each week. 8. Encouraged to attend support group   9. I have discussed this plan with patient and they verbalized understanding  10. Follow up in 2 months or sooner if patient has questions, concerns or worsening of condition, if unable to reach our office, patient should report to the ED. 6. Ms. Yo Nuñez has a reminder for a \"due or due soon\" health maintenance. I have asked that she contact her primary care provider for a follow-up on this health maintenance.

## 2017-11-16 NOTE — PATIENT INSTRUCTIONS
Patient Instructions      1. Remember hydration goals - minimum of 64 ounces of liquids per day (dehydration is the number one reason for hospital readmission). 2. Continue to monitor carbohydrate and protein intake you need a minimum of  Grams of protein daily- remember to keep your total carbohydrates to 50 grams or less per day for best results. 3. Continue to work towards exercise goals - 60-90 minutes, 5 times a week minimum of deliberate, aerobic exercise is the ultimate goal with strength training 2 times each week. Refer to [employer] for  information. 4. Remember to take vitamins as directed. 5. Attend support group the 2nd Thursday of each month. 6. Use Miralax if you become constipated. 7. Call us at [phone] or email us through [email or web address]" with questions,     concerns or worsening of condition, we have someone on call 24 hours a day. If you are unable to reach our office, you are to go to your Primary Care Physician or the Emergency Department. Supplement Resource Guide    Importance of Protein:   Maintains lean body mass, produces antibodies to fight off infections, heals wounds, minimizes hair loss, helps to give you energy, helps with satiety, and keeping you full between meals. Importance of Calcium:  Needed for healthy bones and teeth, normal blood clotting, and nervous system functioning, higher risk of osteoporosis and bone disease with non-compliance. Importance of Multivitamins: Many functions. Supply you with extra nutrients that you may be missing from food. May lead to iron deficiency anemia, weakness, fatigue, and many other symptoms with non-compliance. Importance of B Vitamins:  Important for red blood cell formation, metabolism, energy, and helps to maintain a healthy nervous system. Protein Supplement  Find one you like now. Use immediately after surgery.    Look for:  35-50g protein each day from your protein supplement once you reach the progression diet. 0-3 g fat per serving  0-3 g sugar per serving    Protein drinks should be split in separate dosages. Recommend: Lifelong  1 year + Calcium Supplement:     Start taking within a month after surgery. Look for: Calcium Citrate Plus D (1500 mg per day)  Recommend: Citracal     .            Avoid chocolate chewable calcium. Can use chewable bariatric or [employer] brand or similar chewable. The body cannot absorb more than 500-600 mg of calcium at a time. Take for Life Multi-vitamin Supplement:      Start immediately after surgery: any complete chewable, such as: [place]s Complete chewables. Avoid [place] sours or gummies. They lack iron and other important nutrients and also have added sugar. Continue with chewable vitamin or change to adult complete multivitamin one month after surgery. Menstruating women can take a prenatal vitamin. Make sure has at least 18 mg iron and 828-003 mcg folic acid   Vitamin Z86, B Complex Vitamin, and Biotin  Start taking within a month after surgery. Vitamin B12:  1000 mcg of Vitamin B12 three times weekly    Must take sublingually (meaning you take it under your tongue) or in a liquid drop form for easy absorption. B Complex Vitamin: Take a pill or liquid drop form once daily. Biotin: This vitamin can help prevent hair loss. Recommend 5mg   (5000 mcg) a day  Biotin is Optional       5 [place] to Success    1. Stay hydrated  2. Focus on protein  3. Eat regularly  4. Take your vitamins  5. Be active     Walking for Exercise: Care Instructions  Your Care Instructions    Walking is one of the easiest ways to get the exercise you need for good health. A brisk, 30-minute walk each day can help you feel better and have more energy. It can help you lower your risk of disease. Walking can help you keep your bones strong and your heart healthy.   Check with your doctor before you start a walking plan if you have heart problems, other health issues, or you have not been active in a long time. Follow your doctor's instructions for safe levels of exercise. Follow-up care is a key part of your treatment and safety. Be sure to make and go to all appointments, and call your doctor if you are having problems. It's also a good idea to know your test results and keep a list of the medicines you take. How can you care for yourself at home? Getting started  · Start slowly and set a short-term goal. For example, walk for 5 or 10 minutes every day. · Bit by bit, increase the amount you walk every day. Try for at least 30 minutes on most days of the week. You also may want to swim, bike, or do other activities. · If finding enough time is a problem, it is fine to be active in blocks of 10 minutes or more throughout your day and week. · To get the heart-healthy benefits of walking, you need to walk briskly enough to increase your heart rate and breathing, but not so fast that you cannot talk comfortably. · Wear comfortable shoes that fit well and provide good support for your feet and ankles. Staying with your plan  · After you've made walking a habit, set a longer-term goal. You may want to set a goal of walking briskly for longer or walking farther. Experts say to do 2½ hours of moderate activity a week. A faster heartbeat is what defines moderate-level activity. · To stay motivated, walk with friends, coworkers, or pets. · Use a phone julio c or pedometer to track your steps each day. Set a goal to increase your steps. Once you get there, set a higher goal. Aim for 10,000 steps a day. · If the weather keeps you from walking outside, go for walks at the mall with a friend. Local schools and churches may have indoor gyms where you can walk. Fitting a walk into your workday  · Park several blocks away from work, or get off the bus a few stops early.   · Use the stairs instead of the elevator, at least for a few floors. · Suggest holding meetings with colleagues during a walk inside or outside the building. · Use the restroom that is the farthest from your desk or workstation. · Use your morning and afternoon breaks to take quick 15-minute walks. Staying safe  · Know your surroundings. Walk in a well-lighted, safe place. If it is dark, walk with a partner. Wear light-colored clothing. If you can, buy a vest or jacket that reflects light. · Carry a cell phone for emergencies. · Drink plenty of water. Take a water bottle with you when you walk. This is very important if it is hot out. · Be careful not to slip on wet or icy ground. You can buy \"grippers\" for your shoes to help keep you from slipping. · Pay attention to your walking surface. Use sidewalks and paths. · If you have breathing problems like asthma or COPD, ask your doctor when it is safe for you to walk outdoors. Cold, dry air, smog, pollen, or other things in the air could cause breathing problems. Where can you learn more? Go to http://astrid-jesusita.info/. Enter R159 in the search box to learn more about \"Walking for Exercise: Care Instructions. \"  Current as of: March 13, 2017  Content Version: 11.4  © 8450-0568 [employer]. Care instructions adapted under license by [employer] (which disclaims liability or warranty for this information). If you have questions about a medical condition or this instruction, always ask your healthcare professional. [place] [address] any warranty or liability for your use of this information.

## 2017-11-16 NOTE — MR AVS SNAPSHOT
Visit Information Date & Time Provider Department Dept. Phone Encounter #  
 11/16/2017  4:00 PM [facility] [phone] [id number] Follow-up Instructions Return in about 2 months (around 1/16/2018). Your Appointments 1/16/2018  1:30 PM  
Office Visit with [facility] ([facility]) Appt Note: 4 mo po  
 [address] [facility] [place] [address]  
  
   
 [address] [place] [address] Upcoming Health Maintenance Date Due Hepatitis C Screening 1960 Pneumococcal 19-64 Highest Risk (1 of 3 - PCV13) 11/15/1978 DTaP/Tdap/Td series (1 - Tdap) 11/15/1980 PAP AKA CERVICAL CYTOLOGY 11/15/1980 BREAST CANCER SCRN MAMMOGRAM 11/15/2009 FOBT Q 1 YEAR AGE 50-75 11/15/2009 Influenza Age 5 to Adult 8/1/2017 Allergies as of 11/16/2017  Review Complete On: 11/16/2017 By: Allan Canales NP Severity Noted Reaction Type Reactions Shellfish Derived High 03/09/2016   Systemic Shortness of Breath, Swelling Adhesive Tape-silicones  16/10/8524    Other (comments) Skin peels and blister Sulfa (Sulfonamide Antibiotics)  03/09/2016    Itching Current Immunizations  Never Reviewed No immunizations on file. Not reviewed this visit Vitals BP Pulse Height(growth percentile) Weight(growth percentile) SpO2 BMI  
 135/65 (BP 1 Location: Left arm, BP Patient Position: Sitting) 95 5' 7\" (1.702 m) 212 lb 11.2 oz (96.5 kg) 100% 33.31 kg/m2 OB Status Smoking Status Hysterectomy Former Smoker Vitals History BMI and BSA Data Body Mass Index Body Surface Area  
 33.31 kg/m 2 2.14 m 2 Preferred Pharmacy Pharmacy Name Phone  [address], [address] [facility] AT [facility] [phone] Your Updated Medication List  
  
   
This list is accurate as of: 11/16/17  4:08 PM.  Always use your most recent med list.  
  
  
  
  
 baclofen 10 mg tablet Commonly known as:  LIORESAL Take 10 mg by mouth two (2) times a day. Indications: MS  
  
 cetirizine 10 mg tablet Commonly known as:  ZYRTEC Take 10 mg by mouth daily. cyclobenzaprine 10 mg tablet Commonly known as:  FLEXERIL Take 10 mg by mouth daily. cycloSPORINE 0.05 % ophthalmic emulsion Commonly known as:  RESTASIS Administer 1 Drop to both eyes two (2) times a day. dimethyl fumarate 240 mg Cpdr  
Take 240 mg by mouth two (2) times a day. DULoxetine 60 mg capsule Commonly known as:  CYMBALTA Take 60 mg by mouth nightly. Indications: ANXIETY WITH DEPRESSION, NEUROPATHIC PAIN  
  
 EPINEPHrine 0.3 mg/0.3 mL injection Commonly known as:  EPIPEN  
0.3 mg by IntraMUSCular route once as needed for Anaphylaxis (Iodine/Shellfish). Indications: ANAPHYLAXIS  
  
 gabapentin 300 mg capsule Commonly known as:  NEURONTIN Take 600 mg by mouth two (2) times a day. multivitamin tablet Commonly known as:  ONE A DAY Take 1 Tab by mouth daily. PEPCID AC 20 mg tablet Generic drug:  famotidine Take 20 mg by mouth nightly. Indications: gastroesophageal reflux disease PROMETHAZINE (BULK)  
by Does Not Apply route. sucralfate 1 gram tablet Commonly known as:  Lala Hashimoto Take 1 Tab by mouth four (4) times daily. Dissolve in 1 tablespoon of water. SYNTHROID 112 mcg tablet Generic drug:  levothyroxine Take 112 mcg by mouth Daily (before breakfast). Indications: ADJUNCT TO SURGERY OR RADIOTHERAPY FOR THYROID CARCINOMA  
  
 telmisartan 40 mg tablet Commonly known as:  Paulino Krabbe Take 40 mg by mouth daily. Indications: HYPERTENSION  
  
 TESSALON PERLES 100 mg capsule Generic drug:  benzonatate Take 100 mg by mouth three (3) times daily as needed for Cough. TYLENOL EXTRA STRENGTH 500 mg tablet Generic drug:  acetaminophen Take 1,000 mg by mouth daily. XOLAIR SC  
by SubCUTAneous route every thirty (30) days. zolpidem 10 mg tablet Commonly known as:  AMBIEN Take 10 mg by mouth nightly as needed for Sleep. Prescriptions Printed Refills  
 sucralfate (CARAFATE) 1 gram tablet 1 Sig: Take 1 Tab by mouth four (4) times daily. Dissolve in 1 tablespoon of water. Class: Print Route: Oral  
  
Follow-up Instructions Return in about 2 months (around 1/16/2018). Patient Instructions Patient Instructions 1. Remember hydration goals - minimum of 64 ounces of liquids per day (dehydration is the number one reason for hospital readmission). 2. Continue to monitor carbohydrate and protein intake you need a minimum of  Grams of protein daily- remember to keep your total carbohydrates to 50 grams or less per day for best results. 3. Continue to work towards exercise goals - 60-90 minutes, 5 times a week minimum of deliberate, aerobic exercise is the ultimate goal with strength training 2 times each week. Refer to [employer] for  information. 4. Remember to take vitamins as directed. 5. Attend support group the 2nd Thursday of each month. 6. Use Miralax if you become constipated. 7. Call us at  or email us through [email or web address] with questions,     concerns or worsening of condition, we have someone on call 24 hours a day. If you are unable to reach our office, you are to go to your Primary Care Physician or the Emergency Department. Supplement Resource Guide Importance of Protein:  
Maintains lean body mass, produces antibodies to fight off infections, heals wounds, minimizes hair loss, helps to give you energy, helps with satiety, and keeping you full between meals. Importance of Calcium: 
Needed for healthy bones and teeth, normal blood clotting, and nervous system functioning, higher risk of osteoporosis and bone disease with non-compliance. Importance of Multivitamins: Many functions. Supply you with extra nutrients that you may be missing from food. May lead to iron deficiency anemia, weakness, fatigue, and many other symptoms with non-compliance. Importance of B Vitamins: 
Important for red blood cell formation, metabolism, energy, and helps to maintain a healthy nervous system. Protein Supplement Find one you like now. Use immediately after surgery. Look for: 
35-50g protein each day from your protein supplement once you reach the progression diet. 0-3 g fat per serving 0-3 g sugar per serving Protein drinks should be split in separate dosages. Recommend: Lifelong 1 year + Calcium Supplement:  
 
Start taking within a month after surgery. Look for: Calcium Citrate Plus D (1500 mg per day) Recommend: Citracal 
 
 . Avoid chocolate chewable calcium. Can use chewable bariatric or [employer] brand or similar chewable. The body cannot absorb more than 500-600 mg of calcium at a time. Take for Life Multi-vitamin Supplement:   
 
Start immediately after surgery: any complete chewable, such as: [place]s Complete chewables. Avoid [place] sours or gummies. They lack iron and other important nutrients and also have added sugar. Continue with chewable vitamin or change to adult complete multivitamin one month after surgery. Menstruating women can take a prenatal vitamin. Make sure has at least 18 mg iron and 665-260 mcg folic acid Vitamin B12, B Complex Vitamin, and Biotin Start taking within a month after surgery. Vitamin B12:  1000 mcg of Vitamin B12 three times weekly Must take sublingually (meaning you take it under your tongue) or in a liquid drop form for easy absorption. B Complex Vitamin: Take a pill or liquid drop form once daily. Biotin: This vitamin can help prevent hair loss. Recommend 5mg  
(5000 mcg) a day Biotin is Optional  
 
 
[address] [place] 1. Stay hydrated 2. Focus on protein 3. Eat regularly 4. Take your vitamins 5. Be active Walking for Exercise: Care Instructions Your Care Instructions Walking is one of the easiest ways to get the exercise you need for good health. A brisk, 30-minute walk each day can help you feel better and have more energy. It can help you lower your risk of disease. Walking can help you keep your bones strong and your heart healthy. Check with your doctor before you start a walking plan if you have heart problems, other health issues, or you have not been active in a long time. Follow your doctor's instructions for safe levels of exercise. Follow-up care is a key part of your treatment and safety. Be sure to make and go to all appointments, and call your doctor if you are having problems. It's also a good idea to know your test results and keep a list of the medicines you take. How can you care for yourself at home? Getting started · Start slowly and set a short-term goal. For example, walk for 5 or 10 minutes every day. · Bit by bit, increase the amount you walk every day. Try for at least 30 minutes on most days of the week. You also may want to swim, bike, or do other activities. · If finding enough time is a problem, it is fine to be active in blocks of 10 minutes or more throughout your day and week. · To get the heart-healthy benefits of walking, you need to walk briskly enough to increase your heart rate and breathing, but not so fast that you cannot talk comfortably. · Wear comfortable shoes that fit well and provide good support for your feet and ankles. Staying with your plan · After you've made walking a habit, set a longer-term goal. You may want to set a goal of walking briskly for longer or walking farther. Experts say to do 2½ hours of moderate activity a week. A faster heartbeat is what defines moderate-level activity. · To stay motivated, walk with friends, coworkers, or pets. · Use a phone julio c or pedometer to track your steps each day. Set a goal to increase your steps. Once you get there, set a higher goal. Aim for 10,000 steps a day. · If the weather keeps you from walking outside, go for walks at the mall with a friend. Local schools and churches may have indoor gyms where you can walk. Fitting a walk into your workday · Park several blocks away from work, or get off the bus a few stops early. · Use the stairs instead of the elevator, at least for a few floors. · Suggest holding meetings with colleagues during a walk inside or outside the building. · Use the restroom that is the farthest from your desk or workstation. · Use your morning and afternoon breaks to take quick 15-minute walks. Staying safe · Know your surroundings. Walk in a well-lighted, safe place. If it is dark, walk with a partner. Wear light-colored clothing. If you can, buy a vest or jacket that reflects light. · Carry a cell phone for emergencies. · Drink plenty of water. Take a water bottle with you when you walk. This is very important if it is hot out. · Be careful not to slip on wet or icy ground. You can buy \"grippers\" for your shoes to help keep you from slipping. · Pay attention to your walking surface. Use sidewalks and paths. · If you have breathing problems like asthma or COPD, ask your doctor when it is safe for you to walk outdoors. Cold, dry air, smog, pollen, or other things in the air could cause breathing problems. Where can you learn more? Go to http://astrid-jesusita.info/. Enter R159 in the search box to learn more about \"Walking for Exercise: Care Instructions. \" Current as of: March 13, 2017 Content Version: 11.4 © 9969-5067 [employer]. Care instructions adapted under license by [employer] (which disclaims liability or warranty for this information). If you have questions about a medical condition or this instruction, always ask your healthcare professional. [address] any warranty or liability for your use of this information. Introducing [facility]! Carmel Tavarez introduces [employer] patient portal. Now you can access parts of your medical record, email your doctor's office, and request medication refills online. 1. In your internet browser, go to [email or web address] 2. Click on the First Time User? Click Here link in the Sign In box. You will see the New Member Sign Up page. 3. Enter your [employer] Access Code exactly as it appears below. You will not need to use this code after youve completed the sign-up process. If you do not sign up before the expiration date, you must request a new code. · [employer]ub[employer]s Access Code: [facility] Expires: 12/19/2017  4:02 PM 
 
4. Enter the last four digits of your Social Security Number (xxxx) and Date of Birth (mm/dd/yyyy) as indicated and click Submit. You will be taken to the next sign-up page. 5. Create a Nubimetrics ID. This will be your [employer]s login ID and cannot be changed, so think of one that is secure and easy to remember. 6. Create a [employer] password. You can change your password at any time. 7. Enter your Password Reset Question and Answer. This can be used at a later time if you forget your password. 8. Enter your e-mail address. You will receive e-mail notification when new information is available in [address] 9. Click Sign Up. You can now view and download portions of your medical record. 10. Click the Download Summary menu link to download a portable copy of your medical information.  
 
If you have questions, please visit the Frequently Asked Questions section of the "[employer]. Remember, [employer] is NOT to be used for urgent needs. For medical emergencies, dial 911. Now available from your iPhone and Android! Please provide this summary of care documentation to your next provider. Your primary care clinician is listed as Luz Maria Dominique. If you have any questions after today's visit, please call [phone].

## 2018-02-13 ENCOUNTER — OFFICE VISIT (OUTPATIENT)
Dept: SURGERY | Age: 58
End: 2018-02-13

## 2018-02-13 ENCOUNTER — HOSPITAL ENCOUNTER (OUTPATIENT)
Dept: LAB | Age: 58
Discharge: HOME | End: 2018-02-13
Payer: MEDICARE

## 2018-02-13 VITALS
BODY MASS INDEX: 30.06 KG/M2 | HEART RATE: 90 BPM | DIASTOLIC BLOOD PRESSURE: 67 MMHG | HEIGHT: 67 IN | WEIGHT: 191.5 LBS | SYSTOLIC BLOOD PRESSURE: 138 MMHG | OXYGEN SATURATION: 100 %

## 2018-02-13 DIAGNOSIS — E55.9 HYPOVITAMINOSIS D: ICD-10-CM

## 2018-02-13 DIAGNOSIS — K90.9 INTESTINAL MALABSORPTION, UNSPECIFIED TYPE: Primary | ICD-10-CM

## 2018-02-13 DIAGNOSIS — K90.9 INTESTINAL MALABSORPTION, UNSPECIFIED TYPE: ICD-10-CM

## 2018-02-13 DIAGNOSIS — Z98.890 S/P GASTRIC SURGERY: ICD-10-CM

## 2018-02-13 LAB
25(OH)D3 SERPL-MCNC: 68.6 NG/ML (ref 30–100)
ALBUMIN SERPL-MCNC: 3.3 G/DL (ref 3.4–5)
ALBUMIN/GLOB SERPL: 1 {RATIO} (ref 0.8–1.7)
ALP SERPL-CCNC: 51 U/L (ref 45–117)
ALT SERPL-CCNC: 27 U/L (ref 13–56)
ANION GAP SERPL CALC-SCNC: 6 MMOL/L (ref 3–18)
AST SERPL-CCNC: 20 U/L (ref 15–37)
BASOPHILS # BLD: 0 K/UL (ref 0–0.06)
BASOPHILS NFR BLD: 1 % (ref 0–2)
BILIRUB SERPL-MCNC: 0.5 MG/DL (ref 0.2–1)
BUN SERPL-MCNC: 9 MG/DL (ref 7–18)
BUN/CREAT SERPL: 13 (ref 12–20)
CALCIUM SERPL-MCNC: 8.2 MG/DL (ref 8.5–10.1)
CHLORIDE SERPL-SCNC: 105 MMOL/L (ref 100–108)
CO2 SERPL-SCNC: 33 MMOL/L (ref 21–32)
CREAT SERPL-MCNC: 0.69 MG/DL (ref 0.6–1.3)
DIFFERENTIAL METHOD BLD: ABNORMAL
EOSINOPHIL # BLD: 0.2 K/UL (ref 0–0.4)
EOSINOPHIL NFR BLD: 5 % (ref 0–5)
ERYTHROCYTE [DISTWIDTH] IN BLOOD BY AUTOMATED COUNT: 17.2 % (ref 11.6–14.5)
GLOBULIN SER CALC-MCNC: 3.4 G/DL (ref 2–4)
GLUCOSE SERPL-MCNC: 94 MG/DL (ref 74–99)
HCT VFR BLD AUTO: 34.6 % (ref 35–45)
HGB BLD-MCNC: 11.8 G/DL (ref 12–16)
LYMPHOCYTES # BLD: 1.8 K/UL (ref 0.9–3.6)
LYMPHOCYTES NFR BLD: 45 % (ref 21–52)
MCH RBC QN AUTO: 25.1 PG (ref 24–34)
MCHC RBC AUTO-ENTMCNC: 34.1 G/DL (ref 31–37)
MCV RBC AUTO: 73.5 FL (ref 74–97)
MONOCYTES # BLD: 0.3 K/UL (ref 0.05–1.2)
MONOCYTES NFR BLD: 8 % (ref 3–10)
NEUTS SEG # BLD: 1.6 K/UL (ref 1.8–8)
NEUTS SEG NFR BLD: 41 % (ref 40–73)
PLATELET # BLD AUTO: 202 K/UL (ref 135–420)
PMV BLD AUTO: 10.4 FL (ref 9.2–11.8)
POTASSIUM SERPL-SCNC: 3.4 MMOL/L (ref 3.5–5.5)
PROT SERPL-MCNC: 6.7 G/DL (ref 6.4–8.2)
RBC # BLD AUTO: 4.71 M/UL (ref 4.2–5.3)
SODIUM SERPL-SCNC: 144 MMOL/L (ref 136–145)
WBC # BLD AUTO: 3.9 K/UL (ref 4.6–13.2)

## 2018-02-13 PROCEDURE — 80053 COMPREHEN METABOLIC PANEL: CPT | Performed by: PHYSICIAN ASSISTANT

## 2018-02-13 PROCEDURE — 36415 COLL VENOUS BLD VENIPUNCTURE: CPT | Performed by: PHYSICIAN ASSISTANT

## 2018-02-13 PROCEDURE — 85025 COMPLETE CBC W/AUTO DIFF WBC: CPT | Performed by: PHYSICIAN ASSISTANT

## 2018-02-13 PROCEDURE — 82306 VITAMIN D 25 HYDROXY: CPT | Performed by: PHYSICIAN ASSISTANT

## 2018-02-13 PROCEDURE — 82607 VITAMIN B-12: CPT | Performed by: PHYSICIAN ASSISTANT

## 2018-02-13 PROCEDURE — 83540 ASSAY OF IRON: CPT | Performed by: PHYSICIAN ASSISTANT

## 2018-02-13 PROCEDURE — 82728 ASSAY OF FERRITIN: CPT | Performed by: PHYSICIAN ASSISTANT

## 2018-02-13 PROCEDURE — 84425 ASSAY OF VITAMIN B-1: CPT | Performed by: PHYSICIAN ASSISTANT

## 2018-02-13 NOTE — PATIENT INSTRUCTIONS
Patient Instructions      1. Remember hydration goals - minimum of 64 ounces of liquids per day (dehydration is the number one reason for hospital readmission). 2. Continue to monitor carbohydrate and protein intake you need a minimum of  Grams of protein daily- remember to keep your total carbohydrates to 50 grams or less per day for best results. 3. Continue to work towards exercise goals - 60-90 minutes, 5 times a week minimum of deliberate, aerobic exercise is the ultimate goal with strength training 2 times each week. Refer to [employer] for  information. 4. Remember to take vitamins as directed in your handbook. 5. Attend support group the 2nd Thursday of each month. 6. Use Miralax if you become constipated. 7. Call us at [phone] or email us through [email or web address]" with questions,     concerns or worsening of condition, we have someone on call 24 hours a day. If you are unable to reach our office, you are to go to your Primary Care Physician or the Emergency Department. Supplement Resource Guide    Importance of Protein:   Maintains lean body mass, produces antibodies to fight off infections, heals wounds, minimizes hair loss, helps to give you energy, helps with satiety, and keeping you full between meals. Importance of Calcium:  Needed for healthy bones and teeth, normal blood clotting, and nervous system functioning, higher risk of osteoporosis and bone disease with non-compliance. Importance of Multivitamins: Many functions. Supply you with extra nutrients that you may be missing from food. May lead to iron deficiency anemia, weakness, fatigue, and many other symptoms with non-compliance. Importance of B Vitamins:  Important for red blood cell formation, metabolism, energy, and helps to maintain a healthy nervous system. Protein Supplement  Find one you like now. Use immediately after surgery. Look for:  35-50g protein each day from your protein supplement once you reach the progression diet. 0-3 g fat per serving  0-3 g sugar per serving    Protein drinks should be split in separate dosages. Recommend: Lifelong  1 year + Calcium Supplement:     Start taking within a month after surgery. Look for: Calcium Citrate Plus D (1500 mg per day)  Recommend: Citracal     .            Avoid chocolate chewable calcium. Can use chewable bariatric or [employer] brand or similar chewable. The body cannot absorb more than 500-600 mg of calcium at a time. Take for Life Multi-vitamin Supplement:      Start immediately after surgery: any complete chewable, such as: [place]s Complete chewables. Avoid [place] sours or gummies. They lack iron and other important nutrients and also have added sugar. Continue with chewable vitamin or change to adult complete multivitamin one month after surgery. Menstruating women can take a prenatal vitamin. Make sure has at least 18 mg iron and 884-116 mcg folic acid   Vitamin U31, B Complex Vitamin, and Biotin  Start taking within a month after surgery. Vitamin B12:  1000 mcg of Vitamin B12 three times weekly    Must take sublingually (meaning you take it under your tongue) or in a liquid drop form for easy absorption. B Complex Vitamin: Take a pill or liquid drop form once daily. Biotin: This vitamin can help prevent hair loss.     Recommend 5mg   (5000 mcg) a day  Biotin is Optional

## 2018-02-13 NOTE — PROGRESS NOTES
Subjective:      Jacinta Fonseca is a 61 y.o. female is now 4 months status post laparoscopic sleeve gastrectomy. Doing well overall. She has lost a total of 66 pounds since surgery. Body mass index is 29.99 kg/(m^2). Has lost 56% of EBW. Currently on a solid food diet without difficulty, reports still having the belching, but it is getting better and denies vomiting and abdominal pain. Taking in 48oz water daily. Sources of protein include shots, eggs, meat, fish, peanuts, beans, states she is not getting close to the 90-100g of protein. Has not been exercising. Bowel movements are regular. The patient is not having any pain. . The patient is compliant with multivitamins, calcium, Vit D and B12 supplements.      Weight Loss Metrics 2/13/2018 11/16/2017 10/11/2017 10/11/2017 9/26/2017 9/20/2017 9/12/2017   Today's Wt 191 lb 8 oz 212 lb 11.2 oz 233 lb 233 lb 236 lb 3.2 oz 238 lb 265 lb 12.8 oz   BMI 29.99 kg/m2 33.31 kg/m2 36.49 kg/m2 36.49 kg/m2 36.99 kg/m2 37.28 kg/m2 45.98 kg/m2          Comorbidities:    Hypertension: improved  Diabetes: resolved  Obstructive Sleep Apnea: not applicable  Hyperlipidemia: not applicable  Stress Urinary Incontinence: not applicable  Gastroesophageal Reflux: improved, is taking Pepcid and Protonix  Weight related arthropathy:improved     Patient Active Problem List   Diagnosis Code    Osteoarthritis of left knee M17.12    Morbid obesity with BMI of 40.0-44.9, adult ([address] E66.01, Z68.41    Osteoarthritis of right knee M17.11    Borderline diabetes R73.03    Thyroid cancer ([address] C73    Rheumatoid arthritis ([facility] M06.9    Morbid obesity (HCC) E66.01    Hypertension I10    GERD (gastroesophageal reflux disease) K21.9    Asthma J45.909    Adverse effect of anesthesia T41.45XA    S/P gastric surgery Z98.890    Intestinal malabsorption K90.9        Past Medical History:   Diagnosis Date    Adverse effect of anesthesia     small airway, difficult intubation, pt requesting anesthesia consult    Asthma     seasonal    Borderline diabetes     Carpal tunnel syndrome     Chronic pain     lower back, arms and legs    Depression     Diabetes ([facility]     diet control and exercise    GERD (gastroesophageal reflux disease)     H/O seasonal allergies     Hypertension     Morbid obesity ([facility]     Morbid obesity with BMI of 40.0-44.9, adult ([facility]     Multiple sclerosis ([facility] [address]     Osteoarthritis of left knee 2016    Osteoarthritis of right knee     Overactive bladder     Rheumatoid arthritis ([facility] [address] [facility]     Thyroid cancer ([facility] [address]        Past Surgical History:   Procedure Laterality Date    HX  SECTION   &     HX HERNIA REPAIR      umbilical x2    HX HYSTERECTOMY      RYNE    HX LAP [facility]LECYSTECTOMY      -  [facility]    HX ORTHOPAEDIC Bilateral     partial knee replacement    HX OTHER SURGICAL      fatty tumor removal left arm    HX THYROIDECTOMY       [facility]    HX TONSILLECTOMY         Current Outpatient Prescriptions   Medication Sig Dispense Refill    PROMETHAZINE HCL (PROMETHAZINE, BULK,) by Does Not Apply route.  famotidine (PEPCID AC) 20 mg tablet Take 20 mg by mouth nightly. Indications: gastroesophageal reflux disease      levothyroxine (SYNTHROID) 112 mcg tablet Take 112 mcg by mouth Daily (before breakfast). Indications: ADJUNCT TO SURGERY OR RADIOTHERAPY FOR THYROID CARCINOMA      acetaminophen (TYLENOL EXTRA STRENGTH) 500 mg tablet Take 1,000 mg by mouth daily.  multivitamin (ONE A DAY) tablet Take 1 Tab by mouth daily.  OMALIZUMAB (XOLAIR SC) by SubCUTAneous route every thirty (30) days.  gabapentin (NEURONTIN) 300 mg capsule Take 600 mg by mouth two (2) times a day.  zolpidem (AMBIEN) 10 mg tablet Take 10 mg by mouth nightly as needed for Sleep.  cyclobenzaprine (FLEXERIL) 10 mg tablet Take 10 mg by mouth daily.       dimethyl fumarate 240 mg cpDR Take 240 mg by mouth two (2) times a day.  baclofen (LIORESAL) 10 mg tablet Take 10 mg by mouth two (2) times a day. Indications: MS      cycloSPORINE (RESTASIS) 0.05 % ophthalmic emulsion Administer 1 Drop to both eyes two (2) times a day.  EPINEPHrine (EPIPEN) 0.3 mg/0.3 mL (1:1,000) injection 0.3 mg by IntraMUSCular route once as needed for Anaphylaxis (Iodine/Shellfish). Indications: ANAPHYLAXIS      DULoxetine (CYMBALTA) 60 mg capsule Take 60 mg by mouth nightly. Indications: ANXIETY WITH DEPRESSION, NEUROPATHIC PAIN      sucralfate (CARAFATE) 1 gram tablet Take 1 Tab by mouth four (4) times daily. Dissolve in 1 tablespoon of water.  120 Tab 1       Allergies   Allergen Reactions    Shellfish Derived Shortness of Breath and Swelling    Adhesive Tape-Silicones Other (comments)     Skin peels and blister    Sulfa (Sulfonamide Antibiotics) Itching       Review of Systems:  General - No history or complaints of unexpected fever or chills  Head/Neck - No history or complaints of headache or dizziness  Cardiac - No history or complaints of chest pain, palpitations, or shortness of breath  Pulmonary - No history or complaints of shortness of breath or productive cough  Gastrointestinal - as noted above  Genitourinary - No history or complaints of hematuria/dysuria or renal lithiasis  Musculoskeletal - No history or complaints of joint  muscular weakness  Hematologic - No history of any bleeding episodes  Neurologic - No history or complaints of  migraine headaches or neurologic symptoms    Objective:     Visit Vitals    /67 (BP 1 Location: Left arm, BP Patient Position: Sitting)    Pulse 90    Ht 5' 7\" (1.702 m)    Wt 86.9 kg (191 lb 8 oz)    SpO2 100%    BMI 29.99 kg/m2       General:  alert, cooperative, no distress, appears stated age   Chest: lungs clear to auscultation, no rhonchi, rales or wheezes, no accessory muscle use   Cor:   Regular rate and rhythm or without murmur or extra heart sounds   Abdomen: soft, bowel sounds active, non-tender, no masses or organomegaly   Incisions:   healing well, no drainage, no erythema, no hernia, no seroma, no swelling, no dehiscence, incision well approximated       Assessment:   History of Morbid obesity, status post laparoscopic sleeve gastrectomy. Doing well postoperatively. Plan:     1. Increase activity to the goal of 30 minutes daily and Increase fluids  2. Discussed patients weight loss goals and dietary choices in relation to goals. 3. Reminded to measure portions, continue high protein, low carbohydrate diet. Reminded to eat regularly, to eat slowly & not to drink with meals. 4. Continue vitamin supplementation  5. Continue current medications and follow up with PCP for management of regimen. 6. Continue cardio exercise and add resistance exercises. 60-90 minutes of aerobic activity 5 days a week and strength training 2 days each week. 7. Encouraged to attend support group   8. Patient to complete labs before next visit. Lab slip given today. 9. I have discussed this plan with patient and they verbalized understanding  10. Follow up in 2 months or sooner if patient has questions, concerns or worsening of condition, if unable to reach our office, patient should report to the ED. 6. Ms. Tasha Rosario has a reminder for a \"due or due soon\" health maintenance. I have asked that she contact her primary care provider for a follow-up on this health maintenance.

## 2018-02-15 LAB
FERRITIN SERPL-MCNC: 66 NG/ML (ref 8–388)
FOLATE SERPL-MCNC: >20 NG/ML (ref 3.1–17.5)
IRON SERPL-MCNC: 49 UG/DL (ref 50–175)
VIT B1 BLD-SCNC: 106 NMOL/L (ref 66.5–200)
VIT B12 SERPL-MCNC: 1412 PG/ML (ref 211–911)

## 2018-02-19 ENCOUNTER — TELEPHONE (OUTPATIENT)
Dept: SURGERY | Age: 58
End: 2018-02-19

## 2018-02-19 NOTE — TELEPHONE ENCOUNTER
----- Message from Johana Isaacma sent at 2/16/2018  2:36 PM EST -----  Advise on Iron and Calcium supplementation    Patient advised of instructions through voice mail. Office number provided for further questions.

## 2018-04-18 ENCOUNTER — OFFICE VISIT (OUTPATIENT)
Dept: SURGERY | Age: 58
End: 2018-04-18

## 2018-04-18 VITALS
BODY MASS INDEX: 28.05 KG/M2 | WEIGHT: 178.7 LBS | HEART RATE: 91 BPM | HEIGHT: 67 IN | OXYGEN SATURATION: 100 % | RESPIRATION RATE: 16 BRPM | DIASTOLIC BLOOD PRESSURE: 70 MMHG | SYSTOLIC BLOOD PRESSURE: 144 MMHG

## 2018-04-18 DIAGNOSIS — Z98.890 S/P GASTRIC SURGERY: ICD-10-CM

## 2018-04-18 DIAGNOSIS — K90.9 INTESTINAL MALABSORPTION, UNSPECIFIED TYPE: Primary | ICD-10-CM

## 2018-04-18 RX ORDER — GLUCOSAMINE/CHONDR SU A SOD 750-600 MG
TABLET ORAL
COMMUNITY
End: 2019-09-13

## 2018-04-18 RX ORDER — GLUCOSAMINE SULFATE 1500 MG
1000 POWDER IN PACKET (EA) ORAL DAILY
COMMUNITY

## 2018-04-18 RX ORDER — LANOLIN ALCOHOL/MO/W.PET/CERES
500 CREAM (GRAM) TOPICAL
COMMUNITY

## 2018-04-18 RX ORDER — MULTIVIT WITH MINERALS/HERBS
1 TABLET ORAL DAILY
COMMUNITY

## 2018-04-18 NOTE — PROGRESS NOTES
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 Doing well overall. She has lost a total of 79 pounds since surgery. Body mass index is 27.99 kg/(m^2). Has lost 67% of EBW. Currently on a solid food diet without difficulty, reports no issues and denies vomiting and abdominal pain. Taking in 50oz water daily. Sources of protein include meats, cheese, beans, shots. She exercises a little bit, but not like she is supposed to. Bowel movements are regular. The patient is not having any pain. . The patient is compliant with multivitamins, calcium, Vit D and B12 supplements.      Weight Loss Metrics 4/18/2018 2/13/2018 11/16/2017 10/11/2017 10/11/2017 9/26/2017 9/20/2017   Today's Wt 178 lb 11.2 oz 191 lb 8 oz 212 lb 11.2 oz 233 lb 233 lb 236 lb 3.2 oz 238 lb   BMI 27.99 kg/m2 29.99 kg/m2 33.31 kg/m2 36.49 kg/m2 36.49 kg/m2 36.99 kg/m2 37.28 kg/m2          Comorbidities:    Hypertension: resolved  Diabetes: resolved  Obstructive Sleep Apnea: not applicable  Hyperlipidemia: not applicable  Stress Urinary Incontinence: not applicable  Gastroesophageal Reflux: improved, is taking Protonix in AM  Weight related arthropathy:improved     Patient Active Problem List   Diagnosis Code    Osteoarthritis of left knee M17.12    Osteoarthritis of right knee M17.11    Thyroid cancer ([address] C73    Rheumatoid arthritis ([address] M06.9    Hypertension I10    Asthma J45.909    Adverse effect of anesthesia T41.45XA    S/P gastric surgery Z98.890    Intestinal malabsorption K90.9        Past Medical History:   Diagnosis Date    Adverse effect of anesthesia     small airway, difficult intubation, pt requesting anesthesia consult    Asthma     seasonal    Borderline diabetes     Carpal tunnel syndrome     Chronic pain     lower back, arms and legs    Depression     Diabetes ([address]     diet control and exercise    GERD (gastroesophageal reflux disease)     H/O seasonal allergies     Hypertension     Morbid obesity ([facility]     Morbid obesity with BMI of 40.0-44.9, adult ([facility]     Multiple sclerosis ([facility]     Osteoarthritis of left knee 2016    Osteoarthritis of right knee     Overactive bladder     Rheumatoid arthritis ([facility] [address]     Thyroid cancer ([facility] [address] [facility]        Past Surgical History:   Procedure Laterality Date    HX  SECTION   &     HX HERNIA REPAIR      umbilical x2    HX HYSTERECTOMY      RYNE    HX LAP CHOLECYSTECTOMY      -  [facility]    HX ORTHOPAEDIC Bilateral     partial knee replacement    HX OTHER SURGICAL      fatty tumor removal left arm    HX [facility]Y      -  [facility]    HX TONSILLECTOMY         Current Outpatient Prescriptions   Medication Sig Dispense Refill    cranberry extract 450 mg tab tablet Take 450 mg by mouth.  Biotin 2,500 mcg cap Take  by mouth.  omega 3-dha-epa-fish oil (FISH OIL) 100-160-1,000 mg cap Take  by mouth.  flaxseed 1,000 mg cap Take  by mouth.  cyanocobalamin (VITAMIN B-12) 1,000 mcg tablet Take 1,000 mcg by mouth daily.  calcium-cholecalciferol, d3, (CALCIUM 600 + D) 600-125 mg-unit tab Take  by mouth.  cholecalciferol (VITAMIN D3) 1,000 unit cap Take  by mouth daily.  b complex vitamins (B COMPLEX 1) tablet Take 1 Tab by mouth daily.  PROMETHAZINE HCL (PROMETHAZINE, BULK,) by Does Not Apply route.  famotidine (PEPCID AC) 20 mg tablet Take 20 mg by mouth nightly. Indications: gastroesophageal reflux disease      levothyroxine (SYNTHROID) 112 mcg tablet Take 100 mcg by mouth Daily (before breakfast). Indications: ADJUNCT TO SURGERY OR RADIOTHERAPY FOR THYROID CARCINOMA      acetaminophen (TYLENOL EXTRA STRENGTH) 500 mg tablet Take 1,000 mg by mouth daily.  multivitamin (ONE A DAY) tablet Take 1 Tab by mouth daily.  OMALIZUMAB (XOLAIR SC) by SubCUTAneous route every thirty (30) days.       gabapentin (NEURONTIN) 300 mg capsule Take 600 mg by mouth two (2) times a day.  zolpidem (AMBIEN) 10 mg tablet Take 10 mg by mouth nightly as needed for Sleep.  cyclobenzaprine (FLEXERIL) 10 mg tablet Take 10 mg by mouth daily.  dimethyl fumarate 240 mg cpDR Take 240 mg by mouth two (2) times a day.  baclofen (LIORESAL) 10 mg tablet Take 10 mg by mouth two (2) times a day. Indications: MS      cycloSPORINE (RESTASIS) 0.05 % ophthalmic emulsion Administer 1 Drop to both eyes two (2) times a day.  EPINEPHrine (EPIPEN) 0.3 mg/0.3 mL (1:1,000) injection 0.3 mg by IntraMUSCular route once as needed for Anaphylaxis (Iodine/Shellfish). Indications: ANAPHYLAXIS      DULoxetine (CYMBALTA) 60 mg capsule Take 60 mg by mouth nightly.  Indications: ANXIETY WITH DEPRESSION, NEUROPATHIC PAIN         Allergies   Allergen Reactions    Shellfish Derived Shortness of Breath and Swelling    Adhesive Tape-Silicones Other (comments)     Skin peels and blister    Sulfa (Sulfonamide Antibiotics) Itching       Review of Systems:  General - No history or complaints of unexpected fever or chills  Head/Neck - No history or complaints of headache or dizziness  Cardiac - No history or complaints of chest pain, palpitations, or shortness of breath  Pulmonary - No history or complaints of shortness of breath or productive cough  Gastrointestinal - as noted above  Genitourinary - No history or complaints of hematuria/dysuria or renal lithiasis  Musculoskeletal - No history or complaints of joint  muscular weakness  Hematologic - No history of any bleeding episodes  Neurologic - No history or complaints of  migraine headaches or neurologic symptoms    Objective:     Visit Vitals    /70 (BP 1 Location: Left arm, BP Patient Position: Sitting)    Pulse 91    Resp 16    Ht 5' 7\" (1.702 m)    Wt 81.1 kg (178 lb 11.2 oz)    SpO2 100%    BMI 27.99 kg/m2       General:  alert, cooperative, no distress, appears stated age Chest: lungs clear to auscultation, no rhonchi, rales or wheezes, no accessory muscle use   Cor:   Regular rate and rhythm or without murmur or extra heart sounds   Abdomen: soft, bowel sounds active, non-tender, no masses or organomegaly   Incisions:   healing well, no drainage, no erythema, no hernia, no seroma, no swelling, no dehiscence, incision well approximated       Assessment:   History of Morbid obesity, status post laparoscopic sleeve gastrectomy. Doing well postoperatively. Plan:     1. Increase fluids  2. Discussed patients weight loss goals and dietary choices in relation to goals. 3. Reminded to measure portions, continue high protein, low carbohydrate diet. Reminded to eat regularly, to eat slowly & not to drink with meals. 4. Continue vitamin supplementation  5. Continue current medications and follow up with PCP for management of regimen. 6. Continue cardio exercise and add resistance exercises. 60-90 minutes of aerobic activity 5 days a week and strength training 2 days each week. 7. Encouraged to attend support group   8. I have discussed this plan with patient and they verbalized understanding  9. Follow up in 5 months or sooner if patient has questions, concerns or worsening of condition, if unable to reach our office, patient should report to the ED. 10. Ms. Azra Brewer has a reminder for a \"due or due soon\" health maintenance. I have asked that she contact her primary care provider for a follow-up on this health maintenance.

## 2018-04-18 NOTE — MR AVS SNAPSHOT
[address] [facility] 
 
 
 [facility] [address] [facility] 
[phone] Patient: Sonia Hemphill MRN: [id number] Visit Information Date & Time Provider Department Dept. Phone Encounter #  
 4/18/2018  9:00 AM Anna Cortes [facility] [phone] [id number] Follow-up Instructions Return in about 5 months (around 9/18/2018). Upcoming Health Maintenance Date Due Hepatitis C Screening 1960 Pneumococcal 19-64 Highest Risk (1 of 3 - PCV13) 11/15/1978 DTaP/Tdap/Td series (1 - Tdap) 11/15/1980 PAP AKA CERVICAL CYTOLOGY 11/15/1980 BREAST CANCER SCRN MAMMOGRAM 11/15/2009 FOBT Q 1 YEAR AGE 50-75 11/15/2009 Influenza Age 5 to Adult 8/1/2017 MEDICARE YEARLY EXAM 3/28/2018 Allergies as of 4/18/2018  Review Complete On: 4/18/2018 By: Arnetta Ormond, LPN Severity Noted Reaction Type Reactions Shellfish Derived High 03/09/2016   Systemic Shortness of Breath, Swelling Adhesive Tape-silicones  21/76/1093    Other (comments) Skin peels and blister Sulfa (Sulfonamide Antibiotics)  03/09/2016    Itching Current Immunizations  Never Reviewed No immunizations on file. Not reviewed this visit You Were Diagnosed With   
  
 Codes Comments Intestinal malabsorption, unspecified type    -  Primary ICD-10-CM: K90.9 ICD-9-CM: 579.9 S/P gastric surgery     ICD-10-CM: Y39.357 ICD-9-CM: V45.89 Vitals BP Pulse Height(growth percentile) Weight(growth percentile) SpO2 BMI  
 144/70 (BP 1 Location: Left arm, BP Patient Position: Sitting) 91 5' 7\" (1.702 m) 178 lb 11.2 oz (81.1 kg) 100% 27.99 kg/m2 OB Status Smoking Status Hysterectomy Former Smoker Vitals History BMI and BSA Data Body Mass Index Body Surface Area  
 27.99 kg/m 2 1.96 m 2 Preferred Pharmacy Pharmacy Name Phone [address] [address] Tali Florez [phone] Your Updated Medication List  
  
   
This list is accurate as of 4/18/18  9:31 AM.  Always use your most recent med list.  
  
  
  
  
 B COMPLEX 1 tablet Generic drug:  b complex vitamins Take 1 Tab by mouth daily. baclofen 10 mg tablet Commonly known as:  LIORESAL Take 10 mg by mouth two (2) times a day. Indications: MS Biotin 2,500 mcg Cap Take  by mouth. CALCIUM 600 + D 600-125 mg-unit Tab Generic drug:  calcium-cholecalciferol (d3) Take  by mouth.  
  
 cranberry extract 450 mg Tab tablet Take 450 mg by mouth. cyclobenzaprine 10 mg tablet Commonly known as:  FLEXERIL Take 10 mg by mouth daily. cycloSPORINE 0.05 % ophthalmic emulsion Commonly known as:  RESTASIS Administer 1 Drop to both eyes two (2) times a day. dimethyl fumarate 240 mg Cpdr  
Take 240 mg by mouth two (2) times a day. DULoxetine 60 mg capsule Commonly known as:  CYMBALTA Take 60 mg by mouth nightly. Indications: ANXIETY WITH DEPRESSION, NEUROPATHIC PAIN  
  
 EPINEPHrine 0.3 mg/0.3 mL injection Commonly known as:  EPIPEN  
0.3 mg by IntraMUSCular route once as needed for Anaphylaxis (Iodine/Shellfish). Indications: ANAPHYLAXIS FISH -160-1,000 mg Cap Generic drug:  omega 3-dha-epa-fish oil Take  by mouth. flaxseed 1,000 mg Cap Take  by mouth.  
  
 gabapentin 300 mg capsule Commonly known as:  NEURONTIN Take 600 mg by mouth two (2) times a day. multivitamin tablet Commonly known as:  ONE A DAY Take 1 Tab by mouth daily. PEPCID AC 20 mg tablet Generic drug:  famotidine Take 20 mg by mouth nightly. Indications: gastroesophageal reflux disease PROMETHAZINE (BULK)  
by Does Not Apply route. SYNTHROID 112 mcg tablet Generic drug:  levothyroxine Take 100 mcg by mouth Daily (before breakfast). Indications: ADJUNCT TO SURGERY OR RADIOTHERAPY FOR THYROID CARCINOMA TYLENOL EXTRA STRENGTH 500 mg tablet Generic drug:  acetaminophen Take 1,000 mg by mouth daily. VITAMIN B-12 1,000 mcg tablet Generic drug:  cyanocobalamin Take 1,000 mcg by mouth daily. VITAMIN D3 1,000 unit Cap Generic drug:  cholecalciferol Take  by mouth daily. XOLAIR SC  
by SubCUTAneous route every thirty (30) days. zolpidem 10 mg tablet Commonly known as:  AMBIEN Take 10 mg by mouth nightly as needed for Sleep. Follow-up Instructions Return in about 5 months (around 9/18/2018). Patient Instructions Patient Instructions 1. Remember hydration goals - minimum of 64 ounces of liquids per day (dehydration is the number one reason for hospital readmission). 2. Continue to monitor carbohydrate and protein intake you need a minimum of  Grams of protein daily- remember to keep your total carbohydrates to 50 grams or less per day for best results. 3. Continue to work towards exercise goals - 60-90 minutes, 5 times a week minimum of deliberate, aerobic exercise is the ultimate goal with strength training 2 times each week. Refer to [employer] for  information. 4. Remember to take vitamins as directed in your handbook. 5. Attend support group the 2nd Thursday of each month. 6. Constipation: Milk of Magnesia is for immediate relief. Miralax is to be used every day if constipation is a chronic problem. 7. Diarrhea: patients will occasionally develop lactose intolerance after surgery. Check to see if your protein shake has whey in it. If it does try one that does not have whey and stop all yogurts, cheeses and milks to see if the diarrhea goes away.    
8. Call us at [phone] or email us through [place]" with questions, concerns or worsening of condition, we have someone on call 24 hours a day. If you are unable to reach our office, you are to go to your Primary Care Physician or the Emergency Department. Supplement Resource Guide Importance of Protein:  
Maintains lean body mass, produces antibodies to fight off infections, heals wounds, minimizes hair loss, helps to give you energy, helps with satiety, and keeping you full between meals. Importance of Calcium: 
Needed for healthy bones and teeth, normal blood clotting, and nervous system functioning, higher risk of osteoporosis and bone disease with non-compliance. Importance of Multivitamins: Many functions. Supply you with extra nutrients that you may be missing from food. May lead to iron deficiency anemia, weakness, fatigue, and many other symptoms with non-compliance. Importance of B Vitamins: 
Important for red blood cell formation, metabolism, energy, and helps to maintain a healthy nervous system. Protein Supplement Find one you like now. Use immediately after surgery. Look for: 
35-50g protein each day from your protein supplement once you reach the progression diet. 0-3 g fat per serving 0-3 g sugar per serving Protein drinks should be split in separate dosages. Recommend: Lifelong 1 year + Calcium Supplement:  
 
Start taking within a month after surgery. Look for: Calcium Citrate Plus D (1500 mg per day) Recommend: Citracal 
 
 . Avoid chocolate chewable calcium. Can use chewable bariatric or [employer]NC brand or similar chewable. The body cannot absorb more than 500-600 mg of calcium at a time. Take for Life Multi-vitamin Supplement:   
 
Start immediately after surgery: any complete chewable, such as: [place]s Complete chewables. Avoid [place] sours or gummies. They lack iron and other important nutrients and also have added sugar. Continue with chewable vitamin or change to adult complete multivitamin one month after surgery. Menstruating women can take a prenatal vitamin. Make sure has at least 18 mg iron and 087-697 mcg folic acid Vitamin B12, B Complex Vitamin, and Biotin Start taking within a month after surgery. Vitamin B12:  1000 mcg of Vitamin B12 three times weekly Must take sublingually (meaning you take it under your tongue) or in a liquid drop form for easy absorption. B Complex Vitamin: Take a pill or liquid drop form once daily. Biotin: This vitamin can help prevent hair loss. Recommend 5mg  
(5000 mcg) a day Biotin is Optional  
 
 
 
 
 
 
  
Introducing [facility]! [facility] introduces [employer] patient portal. Now you can access parts of your medical record, email your doctor's office, and request medication refills online. 1. In your internet browser, go to [email or web address] 2. Click on the First Time User? Click Here link in the Sign In box. You will see the New Member Sign Up page. 3. Enter your [employer] Access Code exactly as it appears below. You will not need to use this code after youve completed the sign-up process. If you do not sign up before the expiration date, you must request a new code. · [employer] Access Code: [email or web address] Expires: 5/14/2018  3:06 PM 
 
4. Enter the last four digits of your Social Security Number (xxxx) and Date of Birth (mm/dd/yyyy) as indicated and click Submit. You will be taken to the next sign-up page. 5. Create a [email or web address] ID. This will be your [employer] login ID and cannot be changed, so think of one that is secure and easy to remember. 6. Create a [employer] password. You can change your password at any time. 7. Enter your Password Reset Question and Answer. This can be used at a later time if you forget your password. 8. Enter your e-mail address.  You will receive e-mail notification when new information is available in [email or web address]. 9. Click Sign Up. You can now view and download portions of your medical record. 10. Click the Download Summary menu link to download a portable copy of your medical information. If you have questions, please visit the Frequently Asked Questions section of the [email or web address] website. Remember, [email or web address] is NOT to be used for urgent needs. For medical emergencies, dial 911. Now available from your iPhone and Android! Please provide this summary of care documentation to your next provider. Your primary care clinician is listed as Luz Maria Dominique. If you have any questions after today's visit, please call [phone].

## 2018-04-18 NOTE — PATIENT INSTRUCTIONS
Patient Instructions      1. Remember hydration goals - minimum of 64 ounces of liquids per day (dehydration is the number one reason for hospital readmission). 2. Continue to monitor carbohydrate and protein intake you need a minimum of  Grams of protein daily- remember to keep your total carbohydrates to 50 grams or less per day for best results. 3. Continue to work towards exercise goals - 60-90 minutes, 5 times a week minimum of deliberate, aerobic exercise is the ultimate goal with strength training 2 times each week. Refer to [employer] for  information. 4. Remember to take vitamins as directed in your handbook. 5. Attend support group the 2nd Thursday of each month. 6. Constipation: Milk of Magnesia is for immediate relief. Miralax is to be used every day if constipation is a chronic problem. 7. Diarrhea: patients will occasionally develop lactose intolerance after surgery. Check to see if your protein shake has whey in it. If it does try one that does not have whey and stop all yogurts, cheeses and milks to see if the diarrhea goes away. 8. Call us at [phone] or email us through [place]" with questions,     concerns or worsening of condition, we have someone on call 24 hours a day. If you are unable to reach our office, you are to go to your Primary Care Physician or the Emergency Department. Supplement Resource Guide    Importance of Protein:   Maintains lean body mass, produces antibodies to fight off infections, heals wounds, minimizes hair loss, helps to give you energy, helps with satiety, and keeping you full between meals. Importance of Calcium:  Needed for healthy bones and teeth, normal blood clotting, and nervous system functioning, higher risk of osteoporosis and bone disease with non-compliance. Importance of Multivitamins: Many functions.   Supply you with extra nutrients that you may be missing from food. May lead to iron deficiency anemia, weakness, fatigue, and many other symptoms with non-compliance. Importance of B Vitamins:  Important for red blood cell formation, metabolism, energy, and helps to maintain a healthy nervous system. Protein Supplement  Find one you like now. Use immediately after surgery. Look for:  35-50g protein each day from your protein supplement once you reach the progression diet. 0-3 g fat per serving  0-3 g sugar per serving    Protein drinks should be split in separate dosages. Recommend: Lifelong  1 year + Calcium Supplement:     Start taking within a month after surgery. Look for: Calcium Citrate Plus D (1500 mg per day)  Recommend: Citracal     .            Avoid chocolate chewable calcium. Can use chewable bariatric or GNC brand or similar chewable. The body cannot absorb more than 500-600 mg of calcium at a time. Take for Life Multi-vitamin Supplement:      Start immediately after surgery: any complete chewable, such as: [place]s Complete chewables. Avoid [place] sours or gummies. They lack iron and other important nutrients and also have added sugar. Continue with chewable vitamin or change to adult complete multivitamin one month after surgery. Menstruating women can take a prenatal vitamin. Make sure has at least 18 mg iron and 633-136 mcg folic acid   Vitamin E19, B Complex Vitamin, and Biotin  Start taking within a month after surgery. Vitamin B12:  1000 mcg of Vitamin B12 three times weekly    Must take sublingually (meaning you take it under your tongue) or in a liquid drop form for easy absorption. B Complex Vitamin: Take a pill or liquid drop form once daily. Biotin: This vitamin can help prevent hair loss.     Recommend 5mg   (5000 mcg) a day  Biotin is Optional

## 2018-09-12 ENCOUNTER — HOSPITAL ENCOUNTER (OUTPATIENT)
Dept: LAB | Age: 58
Discharge: HOME | End: 2018-09-12
Payer: MEDICARE

## 2018-09-12 ENCOUNTER — OFFICE VISIT (OUTPATIENT)
Dept: SURGERY | Age: 58
End: 2018-09-12

## 2018-09-12 VITALS
TEMPERATURE: 97.8 F | RESPIRATION RATE: 16 BRPM | HEIGHT: 67 IN | BODY MASS INDEX: 27.44 KG/M2 | DIASTOLIC BLOOD PRESSURE: 75 MMHG | WEIGHT: 174.8 LBS | HEART RATE: 83 BPM | SYSTOLIC BLOOD PRESSURE: 146 MMHG | OXYGEN SATURATION: 100 %

## 2018-09-12 DIAGNOSIS — K90.9 INTESTINAL MALABSORPTION, UNSPECIFIED TYPE: ICD-10-CM

## 2018-09-12 DIAGNOSIS — K90.9 INTESTINAL MALABSORPTION, UNSPECIFIED TYPE: Primary | ICD-10-CM

## 2018-09-12 LAB
25(OH)D3 SERPL-MCNC: 49.9 NG/ML (ref 30–100)
ALBUMIN SERPL-MCNC: 3.2 G/DL (ref 3.4–5)
ALBUMIN/GLOB SERPL: 1 {RATIO} (ref 0.8–1.7)
ALP SERPL-CCNC: 62 U/L (ref 45–117)
ALT SERPL-CCNC: 22 U/L (ref 13–56)
ANION GAP SERPL CALC-SCNC: 7 MMOL/L (ref 3–18)
AST SERPL-CCNC: 19 U/L (ref 15–37)
BASOPHILS # BLD: 0 K/UL (ref 0–0.1)
BASOPHILS NFR BLD: 1 % (ref 0–2)
BILIRUB SERPL-MCNC: 0.5 MG/DL (ref 0.2–1)
BUN SERPL-MCNC: 7 MG/DL (ref 7–18)
BUN/CREAT SERPL: 9 (ref 12–20)
CALCIUM SERPL-MCNC: 8.9 MG/DL (ref 8.5–10.1)
CHLORIDE SERPL-SCNC: 104 MMOL/L (ref 100–108)
CO2 SERPL-SCNC: 30 MMOL/L (ref 21–32)
CREAT SERPL-MCNC: 0.76 MG/DL (ref 0.6–1.3)
DIFFERENTIAL METHOD BLD: ABNORMAL
EOSINOPHIL # BLD: 0.3 K/UL (ref 0–0.4)
EOSINOPHIL NFR BLD: 6 % (ref 0–5)
ERYTHROCYTE [DISTWIDTH] IN BLOOD BY AUTOMATED COUNT: 15.2 % (ref 11.6–14.5)
FERRITIN SERPL-MCNC: 72 NG/ML (ref 8–388)
FOLATE SERPL-MCNC: >20 NG/ML (ref 3.1–17.5)
GLOBULIN SER CALC-MCNC: 3.3 G/DL (ref 2–4)
GLUCOSE SERPL-MCNC: 90 MG/DL (ref 74–99)
HCT VFR BLD AUTO: 35.3 % (ref 35–45)
HGB BLD-MCNC: 12 G/DL (ref 12–16)
IRON SERPL-MCNC: 54 UG/DL (ref 50–175)
LYMPHOCYTES # BLD: 1.4 K/UL (ref 0.9–3.6)
LYMPHOCYTES NFR BLD: 34 % (ref 21–52)
MCH RBC QN AUTO: 25.6 PG (ref 24–34)
MCHC RBC AUTO-ENTMCNC: 34 G/DL (ref 31–37)
MCV RBC AUTO: 75.3 FL (ref 74–97)
MONOCYTES # BLD: 0.3 K/UL (ref 0.05–1.2)
MONOCYTES NFR BLD: 7 % (ref 3–10)
NEUTS SEG # BLD: 2 K/UL (ref 1.8–8)
NEUTS SEG NFR BLD: 52 % (ref 40–73)
PLATELET # BLD AUTO: 205 K/UL (ref 135–420)
PMV BLD AUTO: 10.4 FL (ref 9.2–11.8)
POTASSIUM SERPL-SCNC: 4.1 MMOL/L (ref 3.5–5.5)
PROT SERPL-MCNC: 6.5 G/DL (ref 6.4–8.2)
RBC # BLD AUTO: 4.69 M/UL (ref 4.2–5.3)
SODIUM SERPL-SCNC: 141 MMOL/L (ref 136–145)
VIT B12 SERPL-MCNC: 1812 PG/ML (ref 211–911)
WBC # BLD AUTO: 3.9 K/UL (ref 4.6–13.2)

## 2018-09-12 PROCEDURE — 80053 COMPREHEN METABOLIC PANEL: CPT | Performed by: SPECIALIST

## 2018-09-12 PROCEDURE — 82306 VITAMIN D 25 HYDROXY: CPT | Performed by: SPECIALIST

## 2018-09-12 PROCEDURE — 82728 ASSAY OF FERRITIN: CPT | Performed by: SPECIALIST

## 2018-09-12 PROCEDURE — 36415 COLL VENOUS BLD VENIPUNCTURE: CPT | Performed by: SPECIALIST

## 2018-09-12 PROCEDURE — 83540 ASSAY OF IRON: CPT | Performed by: SPECIALIST

## 2018-09-12 PROCEDURE — 84425 ASSAY OF VITAMIN B-1: CPT | Performed by: SPECIALIST

## 2018-09-12 PROCEDURE — 85025 COMPLETE CBC W/AUTO DIFF WBC: CPT | Performed by: SPECIALIST

## 2018-09-12 PROCEDURE — 82607 VITAMIN B-12: CPT | Performed by: SPECIALIST

## 2018-09-12 NOTE — PATIENT INSTRUCTIONS
Body Mass Index: Care Instructions Your Care Instructions Body mass index (BMI) can help you see if your weight is raising your risk for health problems. It uses a formula to compare how much you weigh with how tall you are. · A BMI lower than 18.5 is considered underweight. · A BMI between 18.5 and 24.9 is considered healthy. · A BMI between 25 and 29.9 is considered overweight. A BMI of 30 or higher is considered obese. If your BMI is in the normal range, it means that you have a lower risk for weight-related health problems. If your BMI is in the overweight or obese range, you may be at increased risk for weight-related health problems, such as high blood pressure, heart disease, stroke, arthritis or joint pain, and diabetes. If your BMI is in the underweight range, you may be at increased risk for health problems such as fatigue, lower protection (immunity) against illness, muscle loss, bone loss, hair loss, and hormone problems. BMI is just one measure of your risk for weight-related health problems. You may be at higher risk for health problems if you are not active, you eat an unhealthy diet, or you drink too much alcohol or use tobacco products. Follow-up care is a key part of your treatment and safety. Be sure to make and go to all appointments, and call your doctor if you are having problems. It's also a good idea to know your test results and keep a list of the medicines you take. How can you care for yourself at home? · Practice healthy eating habits. This includes eating plenty of fruits, vegetables, whole grains, lean protein, and low-fat dairy. · If your doctor recommends it, get more exercise. Walking is a good choice. Bit by bit, increase the amount you walk every day. Try for at least 30 minutes on most days of the week. · Do not smoke. Smoking can increase your risk for health problems.  If you need help quitting, talk to your doctor about stop-smoking programs and medicines. These can increase your chances of quitting for good. · Limit alcohol to 2 drinks a day for men and 1 drink a day for women. Too much alcohol can cause health problems. If you have a BMI higher than 25 · Your doctor may do other tests to check your risk for weight-related health problems. This may include measuring the distance around your waist. A waist measurement of more than 40 inches in men or 35 inches in women can increase the risk of weight-related health problems. · Talk with your doctor about steps you can take to stay healthy or improve your health. You may need to make lifestyle changes to lose weight and stay healthy, such as changing your diet and getting regular exercise. If you have a BMI lower than 18.5 · Your doctor may do other tests to check your risk for health problems. · Talk with your doctor about steps you can take to stay healthy or improve your health. You may need to make lifestyle changes to gain or maintain weight and stay healthy, such as getting more healthy foods in your diet and doing exercises to build muscle. Where can you learn more? Go to http://astrid-jesusita.info/. Enter S176 in the search box to learn more about \"Body Mass Index: Care Instructions. \" Current as of: October 9, 2017 Content Version: 11.7 © 1359-7906 [employer]. Care instructions adapted under license by [employer] (which disclaims liability or warranty for this information). If you have questions about a medical condition or this instruction, always ask your healthcare professional. [place] [address] any warranty or liability for your use of this information. Patient Instructions 1. Continue to monitor carbohydrate and protein intake- remember to keep your           total  carbohydrates to 50 grams or less per day for best results. 2. Remember hydration goals - usually 48 to 64 ounces of liquids per day 3. Continue to work towards exercise goals - minimum 3 days per week of 45          minutes to  1 hour at a time. 4. Remember to take vitamins as directed Supplement Resource Guide Importance of Protein:  
Maintains lean body mass, produces antibodies to fight off infections, heals wounds, minimizes hair loss, helps to give you energy, helps with satiety, and keeping you full between meals. Importance of Calcium: 
Needed for healthy bones and teeth, normal blood clotting, and nervous system functioning, higher risk of osteoporosis and bone disease with non-compliance. Importance of Multivitamins: Many functions. Supply you with extra nutrients that you may be missing from food. May lead to iron deficiency anemia, weakness, fatigue, and many other symptoms with non-compliance. Importance of B Vitamins: 
Important for red blood cell formation, metabolism, energy, and helps to maintain a healthy nervous system. Protein Supplement Find one you like now. Use immediately after surgery. Look for: 
35-50g protein each day from your protein supplement once you reach the progression diet. 0-3 g fat per serving 0-3 g sugar per serving Protein drinks should be split in separate dosages. Recommend: Lifelong 1 year + Calcium Supplement:  
 
Start taking within a month after surgery. Look for: Calcium Citrate Plus D (1500 mg per day) Recommend: Citracal 
 
 . Avoid chocolate chewable calcium. Can use chewable bariatric or [employer]NC brand or similar chewable. The body cannot absorb more than 500-600 mg @ a time. Take for Life Multi-vitamin Supplement:   
 
1st Month After Surgery: Any complete chewable, such as: [place]s Complete chewables. Avoid [place] sours or gummies. They lack iron and other important nutrients and also have added sugar. Continue with chewable vitamin or change to adult complete multivitamin one month after surgery. Menstruating women can take a prenatal vitamin. Make sure has at least 18 mg iron and 874-772 mcg folic acid): Vitamin B12, B Complex Vitamin, and Biotin Start taking within a month after surgery. Vitamin B12:  1000 mcg of Vitamin B12 three times weekly Must take sublingually (meaning you take it under your tongue) or in a liquid drop form for easy absorption. B Complex Vitamin: Take a pill or liquid drop form once daily. Biotin: This vitamin can help prevent hair loss. Recommend 5mg  
(5000 mcg) a day Biotin is Optional

## 2018-09-14 LAB — VIT B1 BLD-SCNC: 131.6 NMOL/L (ref 66.5–200)

## 2019-03-13 ENCOUNTER — OFFICE VISIT (OUTPATIENT)
Dept: SURGERY | Age: 59
End: 2019-03-13

## 2019-03-13 VITALS
OXYGEN SATURATION: 100 % | HEIGHT: 67 IN | HEART RATE: 77 BPM | RESPIRATION RATE: 16 BRPM | DIASTOLIC BLOOD PRESSURE: 78 MMHG | WEIGHT: 183.6 LBS | BODY MASS INDEX: 28.82 KG/M2 | SYSTOLIC BLOOD PRESSURE: 146 MMHG | TEMPERATURE: 98.3 F

## 2019-03-13 DIAGNOSIS — K90.9 INTESTINAL MALABSORPTION, UNSPECIFIED TYPE: Primary | ICD-10-CM

## 2019-03-13 DIAGNOSIS — Z98.890 S/P GASTRIC SURGERY: ICD-10-CM

## 2019-03-13 NOTE — PROGRESS NOTES
1.5 year follow-up    Subjective:     Jadiel Fajardo  is a 60 y.o. female who presents for follow-up about 1.5 years following laparoscopic sleeve gastrectomy. She has lost a total of 75 pounds since surgery. Body mass index is 28.76 kg/m². . EBWL is (64%). The patient presents today to assess their progress toward their goal of weight loss and to address any issues that may be present. Today the patient and I have reviewed their diet and how appropriate their food choices are. The following issues have been identified - none from a surgical issue. Pain assessment - 0/10  . Surgery related complication: NA       She reports no real issues and denies vomiting, abdominal pain, diarrhea and difficulty breathing. The patient's exercise level: moderately active. Changes in her medical history and medications have been reviewed.     Patient Active Problem List   Diagnosis Code    Osteoarthritis of left knee M17.12    Osteoarthritis of right knee M17.11    Thyroid cancer ([address] C73    Rheumatoid arthritis ([address] M06.9    Hypertension I10    Asthma J45.909    Adverse effect of anesthesia T41.45XA    S/P gastric surgery Z98.890    Intestinal malabsorption K90.9     Past Medical History:   Diagnosis Date    Adverse effect of anesthesia     small airway, difficult intubation, pt requesting anesthesia consult    Asthma     seasonal    Borderline diabetes     Carpal tunnel syndrome     Chronic pain     lower back, arms and legs    Depression     Diabetes ([address]     diet control and exercise    GERD (gastroesophageal reflux disease)     H/O seasonal allergies     Hypertension     Morbid obesity ([address]     Morbid obesity with BMI of 40.0-44.9, adult ([address]     Multiple sclerosis ([address] 2008    Osteoarthritis of left knee 03/24/2016    Osteoarthritis of right knee     Overactive bladder     Rheumatoid arthritis ([address]     Thyroid cancer [facility])      Past Surgical History:   Procedure Laterality Date    HX  SECTION   &     HX HERNIA REPAIR      umbilical x2    HX HYSTERECTOMY      RYNE    HX LAP [facility]LECYSTECTOMY      -  [facility]    HX ORTHOPAEDIC Bilateral     partial knee replacement    HX OTHER SURGICAL      fatty tumor removal left arm    HX THYROIDECTOMY       [facility]    HX TONSILLECTOMY       Current Outpatient Medications   Medication Sig Dispense Refill    teriflunomide (AUBAGIO) 14 mg tab Take  by mouth.  cranberry extract 450 mg tab tablet Take 450 mg by mouth.  Biotin 2,500 mcg cap Take  by mouth.  omega 3-dha-epa-fish oil (FISH OIL) 100-160-1,000 mg cap Take  by mouth.  cyanocobalamin (VITAMIN B-12) 1,000 mcg tablet Take 1,000 mcg by mouth daily.  calcium-cholecalciferol, d3, (CALCIUM 600 + D) 600-125 mg-unit tab Take  by mouth.  cholecalciferol (VITAMIN D3) 1,000 unit cap Take  by mouth daily.  b complex vitamins (B COMPLEX 1) tablet Take 1 Tab by mouth daily.  PROMETHAZINE HCL (PROMETHAZINE, BULK,) by Does Not Apply route.  famotidine (PEPCID AC) 20 mg tablet Take 20 mg by mouth nightly. Indications: gastroesophageal reflux disease      levothyroxine (SYNTHROID) 112 mcg tablet Take 100 mcg by mouth Daily (before breakfast). Indications: ADJUNCT TO SURGERY OR RADIOTHERAPY FOR THYROID CARCINOMA      acetaminophen (TYLENOL EXTRA STRENGTH) 500 mg tablet Take 1,000 mg by mouth daily.  multivitamin (ONE A DAY) tablet Take 1 Tab by mouth daily.  gabapentin (NEURONTIN) 300 mg capsule Take 600 mg by mouth two (2) times a day.  zolpidem (AMBIEN) 10 mg tablet Take 10 mg by mouth nightly as needed for Sleep.  cyclobenzaprine (FLEXERIL) 10 mg tablet Take 10 mg by mouth daily.  baclofen (LIORESAL) 10 mg tablet Take 10 mg by mouth two (2) times a day. Indications: MS      cycloSPORINE (RESTASIS) 0.05 % ophthalmic emulsion Administer 1 Drop to both eyes two (2) times a day.       EPINEPHrine (EPIPEN) 0.3 mg/0.3 mL (1:1,000) injection 0.3 mg by IntraMUSCular route once as needed for Anaphylaxis (Iodine/Shellfish). Indications: ANAPHYLAXIS      DULoxetine (CYMBALTA) 60 mg capsule Take 60 mg by mouth nightly. Indications: ANXIETY WITH DEPRESSION, NEUROPATHIC PAIN      OMALIZUMAB (XOLAIR SC) by SubCUTAneous route every thirty (30) days. Review of Symptoms:     General - No history or complaints of unexpected fever or chills  Head/Neck - No history or complaints of headache or dizziness  Cardiac - No history or complaints of chest pain, palpitations, or shortness of breath  Pulmonary - No history or complaints of shortness of breath or productive cough  Gastrointestinal - as noted above  Genitourinary - No history or complaints of hematuria/dysuria or renal lithiasis  Musculoskeletal - No history or complaints of joint  muscular weakness  Hematologic - No history of any bleeding episodes  Neurologic - No history or complaints of  migraine headaches or neurologic symptoms    Objective:     Visit Vitals  /78 (BP 1 Location: Left arm, BP Patient Position: Sitting)   Pulse 77   Temp 98.3 °F (36.8 °C)   Resp 16   Ht 5' 7\" (1.702 m)   Wt 83.3 kg (183 lb 9.6 oz)   SpO2 100%   BMI 28.76 kg/m²        Physical Exam:    General:  alert, cooperative, no distress, appears stated age   Lungs:   clear to auscultation bilaterally   Heart:  Regular rate and rhythm   Abdomen:   abdomen is soft without significant tenderness, masses, organomegaly or guarding; Incisions: healing well, no significant drainage         Labs:     No results found for this or any previous visit (from the past 2016 hour(s)). Assessment:     1. History of Morbid obesity, status post  laparoscopic sleeve gastrectomy. Doing well, no concerns. She is happy with her weight and wants to stay between 175 - 180lbs.   She has a good understanding of the post bariatric lifestyle and acknowledges she has a \"day to day\" fight to keep her weight off. Plan:     1. Remember to measure portions, continue low carbohydrate diet  2. Reviewed labs and appropriate changes made to vitamin regimen  3. Remember vitamin supplements - The importance of such was discussed regarding the malabsorptive issues that the surgery creates  4. Exercise regimen appears adequate for age  11. Attend support group  6. Follow-up in 6 month(s). 7. The patient understands the plan of action  8. Total time spent with the patient 30 minutes.

## 2019-09-13 ENCOUNTER — OFFICE VISIT (OUTPATIENT)
Dept: SURGERY | Age: 59
End: 2019-09-13

## 2019-09-13 ENCOUNTER — HOSPITAL ENCOUNTER (OUTPATIENT)
Dept: LAB | Age: 59
Discharge: HOME | End: 2019-09-13
Payer: MEDICARE

## 2019-09-13 VITALS
OXYGEN SATURATION: 100 % | BODY MASS INDEX: 30.61 KG/M2 | DIASTOLIC BLOOD PRESSURE: 68 MMHG | HEIGHT: 67 IN | SYSTOLIC BLOOD PRESSURE: 139 MMHG | HEART RATE: 76 BPM | WEIGHT: 195 LBS | RESPIRATION RATE: 16 BRPM

## 2019-09-13 DIAGNOSIS — K90.9 INTESTINAL MALABSORPTION, UNSPECIFIED TYPE: ICD-10-CM

## 2019-09-13 DIAGNOSIS — Z98.890 S/P GASTRIC SURGERY: ICD-10-CM

## 2019-09-13 DIAGNOSIS — K90.9 INTESTINAL MALABSORPTION, UNSPECIFIED TYPE: Primary | ICD-10-CM

## 2019-09-13 LAB
25(OH)D3 SERPL-MCNC: 41.4 NG/ML (ref 30–100)
ALBUMIN SERPL-MCNC: 3.4 G/DL (ref 3.4–5)
ALBUMIN/GLOB SERPL: 1.1 {RATIO} (ref 0.8–1.7)
ALP SERPL-CCNC: 57 U/L (ref 45–117)
ALT SERPL-CCNC: 16 U/L (ref 13–56)
ANION GAP SERPL CALC-SCNC: 2 MMOL/L (ref 3–18)
AST SERPL-CCNC: 12 U/L (ref 10–38)
BASOPHILS # BLD: 0.1 K/UL (ref 0–0.1)
BASOPHILS NFR BLD: 2 % (ref 0–2)
BILIRUB SERPL-MCNC: 0.4 MG/DL (ref 0.2–1)
BUN SERPL-MCNC: 10 MG/DL (ref 7–18)
BUN/CREAT SERPL: 12 (ref 12–20)
CALCIUM SERPL-MCNC: 8.6 MG/DL (ref 8.5–10.1)
CHLORIDE SERPL-SCNC: 107 MMOL/L (ref 100–111)
CO2 SERPL-SCNC: 33 MMOL/L (ref 21–32)
CREAT SERPL-MCNC: 0.82 MG/DL (ref 0.6–1.3)
DIFFERENTIAL METHOD BLD: ABNORMAL
EOSINOPHIL # BLD: 0.2 K/UL (ref 0–0.4)
EOSINOPHIL NFR BLD: 6 % (ref 0–5)
ERYTHROCYTE [DISTWIDTH] IN BLOOD BY AUTOMATED COUNT: 15.9 % (ref 11.6–14.5)
FERRITIN SERPL-MCNC: 59 NG/ML (ref 8–388)
FOLATE SERPL-MCNC: >20 NG/ML (ref 3.1–17.5)
GLOBULIN SER CALC-MCNC: 3.2 G/DL (ref 2–4)
GLUCOSE SERPL-MCNC: 92 MG/DL (ref 74–99)
HCT VFR BLD AUTO: 36 % (ref 35–45)
HGB BLD-MCNC: 11.9 G/DL (ref 12–16)
IRON SERPL-MCNC: 54 UG/DL (ref 50–175)
LYMPHOCYTES # BLD: 1.3 K/UL (ref 0.9–3.6)
LYMPHOCYTES NFR BLD: 38 % (ref 21–52)
MCH RBC QN AUTO: 25.5 PG (ref 24–34)
MCHC RBC AUTO-ENTMCNC: 33.1 G/DL (ref 31–37)
MCV RBC AUTO: 77.1 FL (ref 74–97)
MONOCYTES # BLD: 0.4 K/UL (ref 0.05–1.2)
MONOCYTES NFR BLD: 11 % (ref 3–10)
NEUTS SEG # BLD: 1.5 K/UL (ref 1.8–8)
NEUTS SEG NFR BLD: 43 % (ref 40–73)
PLATELET # BLD AUTO: 209 K/UL (ref 135–420)
PMV BLD AUTO: 10.5 FL (ref 9.2–11.8)
POTASSIUM SERPL-SCNC: 4.4 MMOL/L (ref 3.5–5.5)
PROT SERPL-MCNC: 6.6 G/DL (ref 6.4–8.2)
RBC # BLD AUTO: 4.67 M/UL (ref 4.2–5.3)
SODIUM SERPL-SCNC: 142 MMOL/L (ref 136–145)
VIT B12 SERPL-MCNC: 790 PG/ML (ref 211–911)
WBC # BLD AUTO: 3.4 K/UL (ref 4.6–13.2)

## 2019-09-13 PROCEDURE — 82306 VITAMIN D 25 HYDROXY: CPT

## 2019-09-13 PROCEDURE — 85025 COMPLETE CBC W/AUTO DIFF WBC: CPT

## 2019-09-13 PROCEDURE — 82607 VITAMIN B-12: CPT

## 2019-09-13 PROCEDURE — 80053 COMPREHEN METABOLIC PANEL: CPT

## 2019-09-13 PROCEDURE — 83540 ASSAY OF IRON: CPT

## 2019-09-13 PROCEDURE — 84425 ASSAY OF VITAMIN B-1: CPT

## 2019-09-13 PROCEDURE — 82728 ASSAY OF FERRITIN: CPT

## 2019-09-13 PROCEDURE — 36415 COLL VENOUS BLD VENIPUNCTURE: CPT

## 2019-09-13 NOTE — PATIENT INSTRUCTIONS

## 2019-09-13 NOTE — PROGRESS NOTES
1. Have you been to the ER, urgent care clinic since your last visit? Hospitalized since your last visit? No    2. Have you seen or consulted any other health care providers outside of the [address] [place] [address] since your last visit? Include any pap smears or colon screening.  No        Chief Complaint   Patient presents with    Follow-up     2 year follow up

## 2019-09-13 NOTE — PROGRESS NOTES
Subjective:     Caleb Wilburn  is a 60 y.o. female who presents for follow-up about 2 years following laparoscopic sleeve gastrectomy. She has lost a total of 63 pounds since surgery. Body mass index is 30.54 kg/m². . EBWL is (54%). The patient presents today to assess their progress toward their goal of weight loss and to address any issues that may be present. Today the patient and I have reviewed their diet and how appropriate their food choices are. The following issues have been identified : some weight regain due to steroid cycles over past 2 months. Weight Loss Metrics 9/13/2019 3/13/2019 9/12/2018 4/18/2018 2/13/2018 11/16/2017 10/11/2017   Today's Wt 195 lb 183 lb 9.6 oz 174 lb 12.8 oz 178 lb 11.2 oz 191 lb 8 oz 212 lb 11.2 oz 233 lb   BMI 30.54 kg/m2 28.76 kg/m2 27.38 kg/m2 27.99 kg/m2 29.99 kg/m2 33.31 kg/m2 36.49 kg/m2     . Surgery related complication: none       She reports as above and denies vomiting and abdominal pain. The patients diet choices have been reviewed today and are good  Patients pain score:0    Weight Loss Metrics 9/13/2019 3/13/2019 9/12/2018 4/18/2018 2/13/2018 11/16/2017 10/11/2017   Today's Wt 195 lb 183 lb 9.6 oz 174 lb 12.8 oz 178 lb 11.2 oz 191 lb 8 oz 212 lb 11.2 oz 233 lb   BMI 30.54 kg/m2 28.76 kg/m2 27.38 kg/m2 27.99 kg/m2 29.99 kg/m2 33.31 kg/m2 36.49 kg/m2        The patient's exercise level: very active. Changes in her medical history and medications have been reviewed.     Patient Active Problem List   Diagnosis Code    Osteoarthritis of left knee M17.12    Osteoarthritis of right knee M17.11    Thyroid cancer ([address] C73    Rheumatoid arthritis ([facility] M06.9    Asthma J45.909    Adverse effect of anesthesia T41.45XA    S/P gastric surgery Z98.890    Intestinal malabsorption K90.9     Past Medical History:   Diagnosis Date    Adverse effect of anesthesia     small airway, difficult intubation, pt requesting anesthesia consult    Asthma     seasonal    Borderline diabetes     Carpal tunnel syndrome     Chronic pain     lower back, arms and legs    Depression     Diabetes (HCC)     diet control and exercise    GERD (gastroesophageal reflux disease)     H/O seasonal allergies     Hypertension     [facility]     Morbid obesity with BMI of 40.0-44.9, adult ([facility]     Multiple sclerosis ([facility]     Osteoarthritis of left knee 2016    Osteoarthritis of right knee     Overactive bladder     Rheumatoid arthritis (HCC)     Thyroid cancer ([facility]      Past Surgical History:   Procedure Laterality Date    HX  SECTION   &     HX HERNIA REPAIR      umbilical x2    HX HYSTERECTOMY      RYNE    HX LAP CHOLECYSTECTOMY      - [facility]    HX ORTHOPAEDIC Bilateral     partial knee replacement    HX OTHER SURGICAL      fatty tumor removal left arm    HX THYROIDECTOMY      - [facility]    HX TONSILLECTOMY      MD LAP, RADHA RESTRICT PROC, LONGITUDINAL GASTRECTOMY      2017     Current Outpatient Medications   Medication Sig Dispense Refill    ocrelizumab (OCREVUS) 30 mg/mL soln injection by IntraVENous route.  cranberry extract 450 mg tab tablet Take 450 mg by mouth.  omega 3-dha-epa-fish oil (FISH OIL) 100-160-1,000 mg cap Take  by mouth.  cyanocobalamin (VITAMIN B-12) 1,000 mcg tablet Take 1,000 mcg by mouth daily.  calcium-cholecalciferol, d3, (CALCIUM 600 + D) 600-125 mg-unit tab Take  by mouth.  cholecalciferol (VITAMIN D3) 1,000 unit cap Take  by mouth daily.  b complex vitamins (B COMPLEX 1) tablet Take 1 Tab by mouth daily.  PROMETHAZINE HCL (PROMETHAZINE, BULK,) by Does Not Apply route.  famotidine (PEPCID AC) 20 mg tablet Take 20 mg by mouth nightly. Indications: gastroesophageal reflux disease      levothyroxine (SYNTHROID) 112 mcg tablet Take 75 mcg by mouth Daily (before breakfast). Indications:  Additional Treatment for Thyroid Cancer      acetaminophen (TYLENOL EXTRA STRENGTH) 500 mg tablet Take 1,000 mg by mouth daily.  multivitamin (ONE A DAY) tablet Take 1 Tab by mouth daily.  gabapentin (NEURONTIN) 300 mg capsule Take 600 mg by mouth two (2) times a day.  zolpidem (AMBIEN) 10 mg tablet Take 10 mg by mouth nightly as needed for Sleep.  cyclobenzaprine (FLEXERIL) 10 mg tablet Take 10 mg by mouth daily.  baclofen (LIORESAL) 10 mg tablet Take 10 mg by mouth two (2) times a day. Indications: MS      cycloSPORINE (RESTASIS) 0.05 % ophthalmic emulsion Administer 1 Drop to both eyes two (2) times a day.  EPINEPHrine (EPIPEN) 0.3 mg/0.3 mL (1:1,000) injection 0.3 mg by IntraMUSCular route once as needed for Anaphylaxis (Iodine/Shellfish). Indications: ANAPHYLAXIS      DULoxetine (CYMBALTA) 60 mg capsule Take 60 mg by mouth nightly.  Indications: ANXIETY WITH DEPRESSION, NEUROPATHIC PAIN          Review of Symptoms:       General - No history or complaints of unexpected fever or chills  Head/Neck - No history or complaints of headache or dizziness  Cardiac - No history or complaints of chest pain, palpitations, or shortness of breath  Pulmonary - No history or complaints of shortness of breath or productive cough  Gastrointestinal - as noted above  Genitourinary - No history or complaints of hematuria/dysuria or renal lithiasis  Musculoskeletal - No history or complaints of joint  muscular weakness  Hematologic - No history of any bleeding episodes  Neurologic - No history or complaints of  migraine headaches or neurologic symptoms                     Objective:     Visit Vitals  /68 (BP 1 Location: Left arm, BP Patient Position: Sitting)   Pulse 76   Resp 16   Ht 5' 7\" (1.702 m)   Wt 88.5 kg (195 lb)   SpO2 100%   BMI 30.54 kg/m²        Physical Exam:    General:  alert, cooperative, no distress, appears stated age   Lungs:   clear to auscultation bilaterally   Heart:  Regular rate and rhythm   Abdomen:   abdomen is soft without significant tenderness, masses, organomegaly or guarding; Incisions: healing well, no hernias       Lab Results   Component Value Date/Time    WBC 3.9 (L) 09/12/2018 09:45 AM    HGB 12.0 09/12/2018 09:45 AM    HCT 35.3 09/12/2018 09:45 AM    PLATELET 879 98/89/2154 09:45 AM    MCV 75.3 09/12/2018 09:45 AM     Lab Results   Component Value Date/Time    Sodium 141 09/12/2018 09:45 AM    Potassium 4.1 09/12/2018 09:45 AM    Chloride 104 09/12/2018 09:45 AM    CO2 30 09/12/2018 09:45 AM    Anion gap 7 09/12/2018 09:45 AM    Glucose 90 09/12/2018 09:45 AM    BUN 7 09/12/2018 09:45 AM    Creatinine 0.76 09/12/2018 09:45 AM    BUN/Creatinine ratio 9 (L) 09/12/2018 09:45 AM    GFR est AA >60 09/12/2018 09:45 AM    GFR est non-AA >60 09/12/2018 09:45 AM    Calcium 8.9 09/12/2018 09:45 AM    Bilirubin, total 0.5 09/12/2018 09:45 AM    AST (SGOT) 19 09/12/2018 09:45 AM    Alk. phosphatase 62 09/12/2018 09:45 AM    Protein, total 6.5 09/12/2018 09:45 AM    Albumin 3.2 (L) 09/12/2018 09:45 AM    Globulin 3.3 09/12/2018 09:45 AM    A-G Ratio 1.0 09/12/2018 09:45 AM    ALT (SGPT) 22 09/12/2018 09:45 AM     Lab Results   Component Value Date/Time    Iron 54 09/12/2018 09:45 AM    TIBC 333 11/25/2009 01:16 PM    Iron % saturation 12 (L) 11/25/2009 01:16 PM    Ferritin 72 09/12/2018 09:45 AM     Lab Results   Component Value Date/Time    Folate >20.0 (H) 09/12/2018 09:45 AM     Lab Results   Component Value Date/Time    Vitamin D 25-Hydroxy 49.9 09/12/2018 09:46 AM           Assessment:     1. History of Morbid obesity, status post  laparoscopic sleeve gastrectomy. Doing well; no concerns. .     Plan:     1. Remember to measure portions, continue low carbohydrate diet  2. Continue to concentrate on protein intake meeting daily requirements  3. Remember vitamin supplements. The importance of such was discussed regarding the malabsorptive issues that the surgery creates.   4. Exercise regimen appears to be: good now  5. Try and attend support group if feasible. 6. Follow-up in 1 year(s). 7. Lab reviewed and appropriate changes made. 8. Total time spent with the patient 30 minutes.

## 2019-09-16 LAB — VIT B1 BLD-SCNC: 133.5 NMOL/L (ref 66.5–200)

## 2019-12-21 NOTE — PROGRESS NOTES
Subjective:      Luci Beyer is a 61 y.o. female is now 2 weeks status post laparoscopic sleeve gastrectomy. Doing well overall. She has lost a total of 29 pounds since surgery. Body mass index is 36.99 kg/(m^2). Currently on a liquid diet without difficulty. Taking in 48oz water daily. Sources of protein include protein shakes. 0 min of activity 7 days a week. Bowel movements are loose from sugar alcohol in sugar free popsicles and pudding. The patient is not having any pain. . The patient is not compliant with multivitamins. Surgery related complications: none. Liver bx report reviewed with patient. Stomach bx report reviewed with patient.     Weight Loss Metrics 9/26/2017 9/20/2017 9/12/2017 8/28/2017 8/23/2017 8/2/2017 7/20/2017   Today's Wt 236 lb 3.2 oz 238 lb 265 lb 12.8 oz 258 lb 263 lb 263 lb 8 oz 259 lb   BMI 36.99 kg/m2 37.28 kg/m2 45.98 kg/m2 40.71 kg/m2 41.5 kg/m2 42.53 kg/m2 41.8 kg/m2          Comorbidities:    Hypertension: improved just stopped meds  Diabetes: not applicable  Obstructive Sleep Apnea: not applicable  Hyperlipidemia: not applicable  Stress Urinary Incontinence: not applicable  Gastroesophageal Reflux: worsened  Weight related arthropathy:resolved     Patient Active Problem List   Diagnosis Code    Osteoarthritis of left knee M17.12    Morbid obesity with BMI of 40.0-44.9, adult ([facility] E66.01, Z68.41    Osteoarthritis of right knee M17.11    Borderline diabetes R73.03    Thyroid cancer ([facility] C73    Rheumatoid arthritis ([facility] M06.9    Morbid obesity ([facility] E66.01    Hypertension I10    GERD (gastroesophageal reflux disease) K21.9    Asthma J45.909    Adverse effect of anesthesia T41.45XA    S/P gastric surgery Z98.890    Intestinal malabsorption K90.9        Past Medical History:   Diagnosis Date    Adverse effect of anesthesia     small airway, difficult intubation, pt requesting anesthesia consult    Asthma     seasonal    Borderline diabetes     Carpal tunnel syndrome     Chronic pain     lower back, arms and legs    Depression     Diabetes ([facility]     diet control and exercise    GERD (gastroesophageal reflux disease)     H/O seasonal allergies     Hypertension     Morbid obesity ([facility]     Morbid obesity with BMI of 40.0-44.9, adult ([facility]     Multiple sclerosis ([facility]     Osteoarthritis of left knee 2016    Osteoarthritis of right knee     Overactive bladder     Rheumatoid arthritis (HCC)     Thyroid cancer ([facility]        Past Surgical History:   Procedure Laterality Date    HX  SECTION   &     HX HERNIA REPAIR      umbilical x2    HX HYSTERECTOMY      RYNE    HX LAP [facility]LECYSTECTOMY      [facility]    HX ORTHOPAEDIC Bilateral     partial knee replacement    HX OTHER SURGICAL      fatty tumor removal left arm    [facility]ECT[facility]Y       [facility]    HX TONSILLECTOMY         Current Outpatient Prescriptions   Medication Sig Dispense Refill    famotidine (PEPCID AC) 20 mg tablet Take 20 mg by mouth nightly. Indications: gastroesophageal reflux disease      cetirizine (ZYRTEC) 10 mg tablet Take 10 mg by mouth daily.  levothyroxine (SYNTHROID) 112 mcg tablet Take 112 mcg by mouth Daily (before breakfast). Indications: ADJUNCT TO SURGERY OR RADIOTHERAPY FOR THYROID CARCINOMA      acetaminophen (TYLENOL EXTRA STRENGTH) 500 mg tablet Take 1,000 mg by mouth daily.  multivitamin (ONE A DAY) tablet Take 1 Tab by mouth daily.  OMALIZUMAB (XOLAIR SC) by SubCUTAneous route every thirty (30) days.  gabapentin (NEURONTIN) 300 mg capsule Take 600 mg by mouth two (2) times a day.  zolpidem (AMBIEN) 10 mg tablet Take 10 mg by mouth nightly as needed for Sleep.  cyclobenzaprine (FLEXERIL) 10 mg tablet Take 10 mg by mouth daily.  dimethyl fumarate 240 mg cpDR Take 240 mg by mouth two (2) times a day.       baclofen (LIORESAL) 10 mg tablet Take 10 mg by mouth two (2) times a day. Indications: MS      telmisartan (MICARDIS) 40 mg tablet Take 40 mg by mouth daily. Indications: HYPERTENSION      cycloSPORINE (RESTASIS) 0.05 % ophthalmic emulsion Administer 1 Drop to both eyes two (2) times a day.  EPINEPHrine (EPIPEN) 0.3 mg/0.3 mL (1:1,000) injection 0.3 mg by IntraMUSCular route once as needed for Anaphylaxis (Iodine/Shellfish). Indications: ANAPHYLAXIS      DULoxetine (CYMBALTA) 60 mg capsule Take 60 mg by mouth nightly. Indications: ANXIETY WITH DEPRESSION, NEUROPATHIC PAIN         Allergies   Allergen Reactions    Shellfish Derived Shortness of Breath and Swelling    Adhesive Tape-Silicones Other (comments)     Skin peels and blister    Sulfa (Sulfonamide Antibiotics) Itching         Objective:     Visit Vitals    /63 (BP 1 Location: Left arm, BP Patient Position: Sitting)    Pulse (!) 105    Ht 5' 7\" (1.702 m)    Wt 107.1 kg (236 lb 3.2 oz)    SpO2 100%    BMI 36.99 kg/m2       General:  alert, cooperative, no distress, appears stated age   Chest: lungs clear to auscultation, no accessory muscle use   Cor:   Regular rate and rhythm   Abdomen: soft, bowel sounds active, non-tender, no masses or organomegaly   Incisions:   healing well, no drainage, no erythema, no hernia, no seroma, no swelling, no dehiscence, incision well approximated       Assessment:   History of Morbid obesity, status post laparoscopic sleeve gastrectomy. Doing well postoperatively. GERD  Plan:     1. Take protonix in AM and Pepcid in PM for GERD  2. Advance diet to soft solid phase. Reminded to measure portions, continue high protein, low carbohydrate diet. Reminded to eat regularly, to eat slowly & not to drink with meals. Refer to the handbook given in class. 3. Continue multivitamin   4. Continue current medications and follow up with PCP for management of regimen. 5. Encouraged to attend support group   6.  I have discussed this plan with patient and they verbalized understanding  7. Follow up in 2 weeks or sooner if patient has questions, concerns or worsening of condition, if unable to reach our office, patient should report to the ED. 8. Ms. Susan Carroll has a reminder for a \"due or due soon\" health maintenance. I have asked that she contact her primary care provider for a follow-up on this health maintenance. 15.4

## 2020-08-25 DIAGNOSIS — J45.909 MILD ASTHMA WITHOUT COMPLICATION, UNSPECIFIED WHETHER PERSISTENT: Primary | ICD-10-CM

## 2020-08-25 DIAGNOSIS — Z98.890 S/P GASTRIC SURGERY: ICD-10-CM

## 2020-08-25 DIAGNOSIS — K90.9 INTESTINAL MALABSORPTION, UNSPECIFIED TYPE: ICD-10-CM

## 2021-01-28 ENCOUNTER — TRANSCRIBE ORDER (OUTPATIENT)
Dept: SCHEDULING | Age: 61
End: 2021-01-28

## 2021-01-28 DIAGNOSIS — M17.12 DEGENERATIVE ARTHRITIS OF LEFT KNEE: Primary | ICD-10-CM

## 2021-01-28 DIAGNOSIS — Z96.652 PRESENCE OF LEFT ARTIFICIAL KNEE JOINT: ICD-10-CM

## 2021-02-02 ENCOUNTER — HOSPITAL ENCOUNTER (OUTPATIENT)
Dept: LAB | Age: 61
Discharge: HOME | End: 2021-02-02
Payer: MEDICARE

## 2021-02-02 ENCOUNTER — TRANSCRIBE ORDER (OUTPATIENT)
Dept: REGISTRATION | Age: 61
End: 2021-02-02

## 2021-02-02 ENCOUNTER — HOSPITAL ENCOUNTER (OUTPATIENT)
Dept: MRI IMAGING | Age: 61
Discharge: HOME | End: 2021-02-02
Attending: PHYSICIAN ASSISTANT
Payer: MEDICARE

## 2021-02-02 DIAGNOSIS — Z96.652 PRESENCE OF LEFT ARTIFICIAL KNEE JOINT: ICD-10-CM

## 2021-02-02 DIAGNOSIS — M17.12 DEGENERATIVE ARTHRITIS OF LEFT KNEE: ICD-10-CM

## 2021-02-02 LAB
ALBUMIN SERPL-MCNC: 3.4 G/DL (ref 3.4–5)
ALBUMIN/GLOB SERPL: 1 {RATIO} (ref 0.8–1.7)
ALP SERPL-CCNC: 68 U/L (ref 45–117)
ALT SERPL-CCNC: 26 U/L (ref 13–56)
ANION GAP SERPL CALC-SCNC: 7 MMOL/L (ref 3–18)
AST SERPL-CCNC: 19 U/L (ref 10–38)
BASOPHILS # BLD: 0.1 K/UL (ref 0–0.1)
BASOPHILS NFR BLD: 1 % (ref 0–2)
BILIRUB SERPL-MCNC: 0.3 MG/DL (ref 0.2–1)
BUN SERPL-MCNC: 7 MG/DL (ref 7–18)
BUN/CREAT SERPL: 11 (ref 12–20)
CALCIUM SERPL-MCNC: 8.2 MG/DL (ref 8.5–10.1)
CHLORIDE SERPL-SCNC: 106 MMOL/L (ref 100–111)
CO2 SERPL-SCNC: 28 MMOL/L (ref 21–32)
CREAT SERPL-MCNC: 0.64 MG/DL (ref 0.6–1.3)
DIFFERENTIAL METHOD BLD: ABNORMAL
EOSINOPHIL # BLD: 0.3 K/UL (ref 0–0.4)
EOSINOPHIL NFR BLD: 6 % (ref 0–5)
ERYTHROCYTE [DISTWIDTH] IN BLOOD BY AUTOMATED COUNT: 16.8 % (ref 11.6–14.5)
FERRITIN SERPL-MCNC: 84 NG/ML (ref 8–388)
FOLATE SERPL-MCNC: >20 NG/ML (ref 3.1–17.5)
GLOBULIN SER CALC-MCNC: 3.4 G/DL (ref 2–4)
GLUCOSE SERPL-MCNC: 72 MG/DL (ref 74–99)
HCT VFR BLD AUTO: 38.1 % (ref 35–45)
HGB BLD-MCNC: 12.5 G/DL (ref 12–16)
IRON SERPL-MCNC: 35 UG/DL (ref 50–175)
LYMPHOCYTES # BLD: 1.8 K/UL (ref 0.9–3.6)
LYMPHOCYTES NFR BLD: 35 % (ref 21–52)
MCH RBC QN AUTO: 25.8 PG (ref 24–34)
MCHC RBC AUTO-ENTMCNC: 32.8 G/DL (ref 31–37)
MCV RBC AUTO: 78.6 FL (ref 74–97)
MONOCYTES # BLD: 0.4 K/UL (ref 0.05–1.2)
MONOCYTES NFR BLD: 7 % (ref 3–10)
NEUTS SEG # BLD: 2.6 K/UL (ref 1.8–8)
NEUTS SEG NFR BLD: 51 % (ref 40–73)
PLATELET # BLD AUTO: 240 K/UL (ref 135–420)
PMV BLD AUTO: 11.3 FL (ref 9.2–11.8)
POTASSIUM SERPL-SCNC: 3.8 MMOL/L (ref 3.5–5.5)
PROT SERPL-MCNC: 6.8 G/DL (ref 6.4–8.2)
RBC # BLD AUTO: 4.85 M/UL (ref 4.2–5.3)
SODIUM SERPL-SCNC: 141 MMOL/L (ref 136–145)
VIT B12 SERPL-MCNC: 761 PG/ML (ref 211–911)
WBC # BLD AUTO: 5.1 K/UL (ref 4.6–13.2)

## 2021-02-02 PROCEDURE — 80053 COMPREHEN METABOLIC PANEL: CPT

## 2021-02-02 PROCEDURE — 73721 MRI JNT OF LWR EXTRE W/O DYE: CPT

## 2021-02-02 PROCEDURE — 36415 COLL VENOUS BLD VENIPUNCTURE: CPT

## 2021-02-02 PROCEDURE — 84425 ASSAY OF VITAMIN B-1: CPT

## 2021-02-02 PROCEDURE — 82728 ASSAY OF FERRITIN: CPT

## 2021-02-02 PROCEDURE — 83540 ASSAY OF IRON: CPT

## 2021-02-02 PROCEDURE — 85025 COMPLETE CBC W/AUTO DIFF WBC: CPT

## 2021-02-02 PROCEDURE — 82607 VITAMIN B-12: CPT

## 2021-02-07 LAB — VIT B1 BLD-SCNC: 81.6 NMOL/L (ref 66.5–200)

## 2021-02-17 ENCOUNTER — HOSPITAL ENCOUNTER (OUTPATIENT)
Dept: LAB | Age: 61
Discharge: HOME | End: 2021-02-17
Payer: MEDICARE

## 2021-02-17 ENCOUNTER — OFFICE VISIT (OUTPATIENT)
Dept: SURGERY | Age: 61
End: 2021-02-17
Payer: MEDICARE

## 2021-02-17 VITALS
WEIGHT: 210.2 LBS | SYSTOLIC BLOOD PRESSURE: 144 MMHG | OXYGEN SATURATION: 97 % | RESPIRATION RATE: 16 BRPM | HEART RATE: 89 BPM | HEIGHT: 66 IN | DIASTOLIC BLOOD PRESSURE: 73 MMHG | BODY MASS INDEX: 33.78 KG/M2

## 2021-02-17 DIAGNOSIS — E55.9 HYPOVITAMINOSIS D: ICD-10-CM

## 2021-02-17 DIAGNOSIS — K90.9 INTESTINAL MALABSORPTION, UNSPECIFIED TYPE: Primary | ICD-10-CM

## 2021-02-17 LAB — 25(OH)D3 SERPL-MCNC: 40.8 NG/ML (ref 30–100)

## 2021-02-17 PROCEDURE — 99214 OFFICE O/P EST MOD 30 MIN: CPT | Performed by: SPECIALIST

## 2021-02-17 PROCEDURE — 36415 COLL VENOUS BLD VENIPUNCTURE: CPT

## 2021-02-17 PROCEDURE — 82306 VITAMIN D 25 HYDROXY: CPT

## 2021-02-17 PROCEDURE — G0463 HOSPITAL OUTPT CLINIC VISIT: HCPCS | Performed by: SPECIALIST

## 2021-02-17 RX ORDER — PANTOPRAZOLE SODIUM 20 MG/1
20 TABLET, DELAYED RELEASE ORAL
COMMUNITY

## 2021-02-17 RX ORDER — ATORVASTATIN CALCIUM 10 MG/1
10 TABLET, FILM COATED ORAL
COMMUNITY

## 2021-02-17 NOTE — PATIENT INSTRUCTIONS
Patient Instructions 1. Continue to monitor carbohydrate and protein intake- remember to keep your           total  carbohydrates to 50 grams or less per day for best results. 2. Remember hydration goals - usually 48 to 64 ounces of liquids per day 3. Continue to work towards exercise goals - minimum 3 days per week of 45          minutes to  1 hour at a time. 4. Remember to take vitamins as directed Supplement Resource Guide Importance of Protein:  
Maintains lean body mass, produces antibodies to fight off infections, heals wounds, minimizes hair loss, helps to give you energy, helps with satiety, and keeping you full between meals. Importance of Calcium: 
Needed for healthy bones and teeth, normal blood clotting, and nervous system functioning, higher risk of osteoporosis and bone disease with non-compliance. Importance of Multivitamins: Many functions. Supply you with extra nutrients that you may be missing from food. May lead to iron deficiency anemia, weakness, fatigue, and many other symptoms with non-compliance. Importance of B Vitamins: 
Important for red blood cell formation, metabolism, energy, and helps to maintain a healthy nervous system. Protein Supplement Find one you like now. Use immediately after surgery. Look for: 
35-50g protein each day from your protein supplement once you reach the progression diet. 0-3 g fat per serving 0-3 g sugar per serving Protein drinks should be split in separate dosages. Recommend: Lifelong 1 year + Calcium Supplement:  
 
Start taking within a month after surgery. Look for: Calcium Citrate Plus D (1500 mg per day) Recommend: Citracal 
 
 . Avoid chocolate chewable calcium. Can use chewable bariatric or [employer] brand or similar chewable. The body cannot absorb more than 500-600 mg @ a time. Take for Life Multi-vitamin Supplement: 1st Month After Surgery: Any complete chewable, such as: Everettss Complete chewables. Avoid Everetts sours or gummies. They lack iron and other important nutrients and also have added sugar. Continue with chewable vitamin or change to adult complete multivitamin one month after surgery. Menstruating women can take a prenatal vitamin. Make sure has at least 18 mg iron and 524-561 mcg folic acid): Vitamin B12, B Complex Vitamin, and Biotin Start taking within a month after surgery. Vitamin B12:  1000 mcg of Vitamin B12 three times weekly Must take sublingually (meaning you take it under your tongue) or in a liquid drop form for easy absorption. B Complex Vitamin: Take a pill or liquid drop form once daily. Biotin: This vitamin can help prevent hair loss. Recommend 5mg  
(5000 mcg) a day Biotin is Optional

## 2021-02-17 NOTE — PROGRESS NOTES
Subjective:      Pauline Gray is a 63 y.o. female is now 3.5 years status post laparoscopic sleeve gastrectomy. Doing well overall. She has lost a total of 48 pounds since surgery. Body mass index is 33.93 kg/m². Has lost 41% of EBW. Currently on a solid food diet without difficulty, reports no issues and denies vomiting and abdominal pain. Taking in 64oz water daily. Sources of protein include protein shakes, cheese, eggs and nuts. She knows she is not getting in enough protein. She won't eat all day and then she will eat carbs at night in the form of cookies, chips, etc.  60 min of activity 1 days a week, including yoga and bike riding. Patient is sleeping 3 hours a night on average. She is taking [place] when she has two or more days without sleeping. Bowel movements are regular. The patient is not having any pain regarding her bariatric surgery. The patient is compliant with multivitamins, calcium, Vit D and B12 supplements.      Weight Loss Metrics 2/17/2021 9/13/2019 3/13/2019 9/12/2018 4/18/2018 2/13/2018 11/16/2017   Today's Wt 210 lb 3.2 oz 195 lb 183 lb 9.6 oz 174 lb 12.8 oz 178 lb 11.2 oz 191 lb 8 oz 212 lb 11.2 oz   BMI 33.93 kg/m2 30.54 kg/m2 28.76 kg/m2 27.38 kg/m2 27.99 kg/m2 29.99 kg/m2 33.31 kg/m2          Comorbidities:    Hypertension: improved, off Rx  Diabetes: resolved  Obstructive Sleep Apnea: not applicable  Hyperlipidemia: not applicable  Stress Urinary Incontinence: not applicable  Gastroesophageal Reflux: improved, is taking Protonix  Weight related arthropathy:improved, has MS     Patient Active Problem List   Diagnosis Code    Osteoarthritis of left knee M17.12    Osteoarthritis of right knee M17.11    Thyroid cancer ([address] C73    Rheumatoid arthritis ([address] M06.9    Asthma J45.909    Adverse effect of anesthesia T41.45XA    S/P gastric surgery Z98.890    Intestinal malabsorption K90.9        Past Medical History:   Diagnosis Date    Adverse effect of anesthesia small airway, difficult intubation, pt requesting anesthesia consult    Asthma     seasonal    Borderline diabetes     Carpal tunnel syndrome     Chronic pain     lower back, arms and legs    Depression     Diabetes ([facility]     diet control and exercise    GERD (gastroesophageal reflux disease)     H/O seasonal allergies     Hypertension     Morbid obesity ([facility]     Morbid obesity with BMI of 40.0-44.9, adult ([facility]     Multiple sclerosis ([facility]     Osteoarthritis of left knee 2016    Osteoarthritis of right knee     Overactive bladder     Rheumatoid arthritis (HCC)     Thyroid cancer ([facility]        Past Surgical History:   Procedure Laterality Date    HX  SECTION   &     HX HERNIA REPAIR      umbilical x2    HX HYSTERECTOMY      RYNE    [facility]      -  [facility]    HX ORTHOPAEDIC Bilateral     partial knee replacement    HX OTHER SURGICAL      fatty tumor removal left arm    HX TH[facility]ROIDECTOMY      -  [facility]    HX TONSILLECTOMY      CT LAP, RADHA RESTRICT PROC, LONGITUDINAL GASTRECTOMY      2017       Current Outpatient Medications   Medication Sig Dispense Refill    atorvastatin (Lipitor) 10 mg tablet Take  by mouth daily.  pantoprazole (Protonix) 20 mg tablet Take 20 mg by mouth daily.  ocrelizumab (OCREVUS) 30 mg/mL soln injection by IntraVENous route.  cranberry extract 450 mg tab tablet Take 450 mg by mouth.  omega 3-dha-epa-fish oil (FISH OIL) 100-160-1,000 mg cap Take  by mouth.  cyanocobalamin (VITAMIN B-12) 1,000 mcg tablet Take 1,000 mcg by mouth daily.  calcium-cholecalciferol, d3, (CALCIUM 600 + D) 600-125 mg-unit tab Take  by mouth.  cholecalciferol (VITAMIN D3) 1,000 unit cap Take  by mouth daily.  b complex vitamins (B COMPLEX 1) tablet Take 1 Tab by mouth daily.  levothyroxine (SYNTHROID) 112 mcg tablet Take 75 mcg by mouth Daily (before breakfast). Indications: Additional Treatment for Thyroid Cancer      acetaminophen (TYLENOL EXTRA STRENGTH) 500 mg tablet Take 1,000 mg by mouth daily.  multivitamin (ONE A DAY) tablet Take 1 Tab by mouth daily.  gabapentin (NEURONTIN) 300 mg capsule Take 600 mg by mouth two (2) times a day.  zolpidem (AMBIEN) 10 mg tablet Take 10 mg by mouth nightly as needed for Sleep.  baclofen (LIORESAL) 10 mg tablet Take 10 mg by mouth two (2) times a day. Indications: MS      cycloSPORINE (RESTASIS) 0.05 % ophthalmic emulsion Administer 1 Drop to both eyes two (2) times a day.  EPINEPHrine (EPIPEN) 0.3 mg/0.3 mL (1:1,000) injection 0.3 mg by IntraMUSCular route once as needed for Anaphylaxis (Iodine/Shellfish). Indications: ANAPHYLAXIS      DULoxetine (CYMBALTA) 60 mg capsule Take 60 mg by mouth nightly.  Indications: ANXIETY WITH DEPRESSION, NEUROPATHIC PAIN         Allergies   Allergen Reactions    Shellfish Derived Shortness of Breath and Swelling    Adhesive Tape-Silicones Other (comments)     Skin peels and blister    Sulfa (Sulfonamide Antibiotics) Itching       Review of Systems:  General - No history or complaints of unexpected fever or chills  Head/Neck - No history or complaints of headache or dizziness  Cardiac - No history or complaints of chest pain, palpitations, or shortness of breath  Pulmonary - No history or complaints of shortness of breath or productive cough  Gastrointestinal - as noted above  Genitourinary - No history or complaints of hematuria/dysuria or renal lithiasis  Musculoskeletal - No history or complaints of joint  muscular weakness  Hematologic - No history of any bleeding episodes  Neurologic - No history or complaints of  migraine headaches or neurologic symptoms    Objective:     Visit Vitals  BP (!) 144/73 (BP 1 Location: Left upper arm, BP Patient Position: Sitting)   Pulse 89   Resp 16   Ht 5' 6\" (1.676 m)   Wt 95.3 kg (210 lb 3.2 oz)   SpO2 97%   BMI 33.93 kg/m² General:  alert, cooperative, no distress, appears stated age   Chest: no accessory muscle use   Cor:   Regular rate and rhythm   Abdomen: soft, bowel sounds active, non-tender, no masses or organomegaly   Incisions:   healing well, no drainage, no erythema, no hernia, no seroma, no swelling, no dehiscence, incision well approximated           Assessment and Plan   1. Intestinal malabsorption  - Continue required Vitamins: B12, B complex, D, iron, calcium, multivitamin  2. S/p laparoscopic bariatric surgery, SLEEVE GASTRECTOMY, history of morbid obesity  - Sleep goal is 7-9 hours each night. Patient education given on the effects of sleep deprivation on weight control.  - Discussed patients weight loss goals and dietary choices in relation to goals. - Reminded to measure portions, continue high protein, low carbohydrate diet. Reminded to eat regularly, to eat slowly & not to drink with meals.    - Continue cardio exercise and add resistance exercises. 60-90 minutes of aerobic activity 5 days a week and strength training 2 days each week. - Encouraged to attend support group   - Required fluid intake is >64oz daily of sugar free beverages. Labs ordered today  Follow up in 1 year or sooner if patient has questions, concerns or worsening of condition. If unable to reach our office and receive immediate care, patient should go to the Emergency Department immediately. Ms. Padma Cline has a reminder for a \"due or due soon\" health maintenance. I have asked that she contact her primary care provider for a follow-up on this health maintenance.

## 2021-03-24 ENCOUNTER — HOSPITAL ENCOUNTER (OUTPATIENT)
Dept: PREADMISSION TESTING | Age: 61
Discharge: HOME | End: 2021-03-24
Payer: MEDICARE

## 2021-03-24 LAB
ALBUMIN SERPL-MCNC: 3.3 G/DL (ref 3.4–5)
ALBUMIN/GLOB SERPL: 1 {RATIO} (ref 0.8–1.7)
ALP SERPL-CCNC: 71 U/L (ref 45–117)
ALT SERPL-CCNC: 21 U/L (ref 13–56)
ANION GAP SERPL CALC-SCNC: 5 MMOL/L (ref 3–18)
APTT PPP: 28.6 SEC (ref 23–36.4)
AST SERPL-CCNC: 13 U/L (ref 10–38)
BASOPHILS # BLD: 0 K/UL (ref 0–0.1)
BASOPHILS NFR BLD: 1 % (ref 0–2)
BILIRUB SERPL-MCNC: 0.5 MG/DL (ref 0.2–1)
BUN SERPL-MCNC: 8 MG/DL (ref 7–18)
BUN/CREAT SERPL: 14 (ref 12–20)
CALCIUM SERPL-MCNC: 8.3 MG/DL (ref 8.5–10.1)
CHLORIDE SERPL-SCNC: 105 MMOL/L (ref 100–111)
CO2 SERPL-SCNC: 30 MMOL/L (ref 21–32)
CREAT SERPL-MCNC: 0.58 MG/DL (ref 0.6–1.3)
DIFFERENTIAL METHOD BLD: ABNORMAL
EOSINOPHIL # BLD: 0.2 K/UL (ref 0–0.4)
EOSINOPHIL NFR BLD: 5 % (ref 0–5)
ERYTHROCYTE [DISTWIDTH] IN BLOOD BY AUTOMATED COUNT: 16.6 % (ref 11.6–14.5)
ERYTHROCYTE [SEDIMENTATION RATE] IN BLOOD: 65 MM/HR (ref 0–30)
EST. AVERAGE GLUCOSE BLD GHB EST-MCNC: 123 MG/DL
GLOBULIN SER CALC-MCNC: 3.3 G/DL (ref 2–4)
GLUCOSE SERPL-MCNC: 81 MG/DL (ref 74–99)
HBA1C MFR BLD: 5.9 % (ref 4.2–5.6)
HCT VFR BLD AUTO: 35.1 % (ref 35–45)
HGB BLD-MCNC: 11.6 G/DL (ref 12–16)
INR PPP: 1 (ref 0.8–1.2)
LYMPHOCYTES # BLD: 1.4 K/UL (ref 0.9–3.6)
LYMPHOCYTES NFR BLD: 28 % (ref 21–52)
MCH RBC QN AUTO: 26.1 PG (ref 24–34)
MCHC RBC AUTO-ENTMCNC: 33 G/DL (ref 31–37)
MCV RBC AUTO: 79.1 FL (ref 74–97)
MONOCYTES # BLD: 0.4 K/UL (ref 0.05–1.2)
MONOCYTES NFR BLD: 9 % (ref 3–10)
NEUTS SEG # BLD: 2.9 K/UL (ref 1.8–8)
NEUTS SEG NFR BLD: 57 % (ref 40–73)
PLATELET # BLD AUTO: 236 K/UL (ref 135–420)
PMV BLD AUTO: 11 FL (ref 9.2–11.8)
POTASSIUM SERPL-SCNC: 4 MMOL/L (ref 3.5–5.5)
PROT SERPL-MCNC: 6.6 G/DL (ref 6.4–8.2)
PROTHROMBIN TIME: 13.4 SEC (ref 11.5–15.2)
RBC # BLD AUTO: 4.44 M/UL (ref 4.2–5.3)
SODIUM SERPL-SCNC: 140 MMOL/L (ref 136–145)
WBC # BLD AUTO: 5.1 K/UL (ref 4.6–13.2)

## 2021-03-24 PROCEDURE — 80053 COMPREHEN METABOLIC PANEL: CPT

## 2021-03-24 PROCEDURE — 93005 ELECTROCARDIOGRAM TRACING: CPT

## 2021-03-24 PROCEDURE — 83036 HEMOGLOBIN GLYCOSYLATED A1C: CPT

## 2021-03-24 PROCEDURE — 85730 THROMBOPLASTIN TIME PARTIAL: CPT

## 2021-03-24 PROCEDURE — 85025 COMPLETE CBC W/AUTO DIFF WBC: CPT

## 2021-03-24 PROCEDURE — 85652 RBC SED RATE AUTOMATED: CPT

## 2021-03-24 PROCEDURE — 85610 PROTHROMBIN TIME: CPT

## 2021-03-24 PROCEDURE — 36415 COLL VENOUS BLD VENIPUNCTURE: CPT

## 2021-03-25 LAB
BACTERIA SPEC CULT: NORMAL
BACTERIA SPEC CULT: NORMAL
SERVICE CMNT-IMP: NORMAL

## 2021-03-26 LAB
ATRIAL RATE: 72 BPM
CALCULATED P AXIS, ECG09: 72 DEGREES
CALCULATED R AXIS, ECG10: 62 DEGREES
CALCULATED T AXIS, ECG11: 53 DEGREES
DIAGNOSIS, 93000: NORMAL
P-R INTERVAL, ECG05: 144 MS
Q-T INTERVAL, ECG07: 392 MS
QRS DURATION, ECG06: 78 MS
QTC CALCULATION (BEZET), ECG08: 429 MS
VENTRICULAR RATE, ECG03: 72 BPM

## 2021-03-30 ENCOUNTER — HOSPITAL ENCOUNTER (OUTPATIENT)
Dept: PREADMISSION TESTING | Age: 61
Discharge: HOME | End: 2021-03-30
Payer: MEDICARE

## 2021-03-30 LAB
APPEARANCE UR: CLEAR
BACTERIA URNS QL MICRO: ABNORMAL /HPF
BILIRUB UR QL: NEGATIVE
COLOR UR: YELLOW
EPITH CASTS URNS QL MICRO: ABNORMAL /LPF (ref 0–5)
GLUCOSE UR STRIP.AUTO-MCNC: NEGATIVE MG/DL
HGB UR QL STRIP: NEGATIVE
KETONES UR QL STRIP.AUTO: NEGATIVE MG/DL
LEUKOCYTE ESTERASE UR QL STRIP.AUTO: ABNORMAL
NITRITE UR QL STRIP.AUTO: NEGATIVE
PH UR STRIP: 7 [PH] (ref 5–8)
PROT UR STRIP-MCNC: NEGATIVE MG/DL
RBC #/AREA URNS HPF: NEGATIVE /HPF (ref 0–5)
SP GR UR REFRACTOMETRY: 1.01 (ref 1–1.03)
UROBILINOGEN UR QL STRIP.AUTO: 1 EU/DL (ref 0.2–1)
WBC URNS QL MICRO: ABNORMAL /HPF (ref 0–5)

## 2021-03-30 PROCEDURE — 81001 URINALYSIS AUTO W/SCOPE: CPT

## 2021-03-30 PROCEDURE — 87086 URINE CULTURE/COLONY COUNT: CPT

## 2021-03-31 LAB
BACTERIA SPEC CULT: NORMAL
SERVICE CMNT-IMP: NORMAL

## 2021-04-06 ENCOUNTER — HOSPITAL ENCOUNTER (OUTPATIENT)
Dept: PREADMISSION TESTING | Age: 61
Discharge: HOME | End: 2021-04-06
Payer: MEDICARE

## 2021-04-06 PROCEDURE — U0003 INFECTIOUS AGENT DETECTION BY NUCLEIC ACID (DNA OR RNA); SEVERE ACUTE RESPIRATORY SYNDROME CORONAVIRUS 2 (SARS-COV-2) (CORONAVIRUS DISEASE [COVID-19]), AMPLIFIED PROBE TECHNIQUE, MAKING USE OF HIGH THROUGHPUT TECHNOLOGIES AS DESCRIBED BY CMS-2020-01-R: HCPCS

## 2021-04-07 LAB — SARS-COV-2, COV2NT: NOT DETECTED

## 2021-04-11 ENCOUNTER — ANESTHESIA EVENT (OUTPATIENT)
Dept: SURGERY | Age: 61
End: 2021-04-11
Payer: MEDICARE

## 2021-04-11 RX ORDER — DEXAMETHASONE SODIUM PHOSPHATE 4 MG/ML
4 INJECTION, SOLUTION INTRA-ARTICULAR; INTRALESIONAL; INTRAMUSCULAR; INTRAVENOUS; SOFT TISSUE ONCE
Status: CANCELLED | OUTPATIENT
Start: 2021-04-11 | End: 2021-04-11

## 2021-04-11 RX ORDER — ACETAMINOPHEN 500 MG
1000 TABLET ORAL ONCE
Status: CANCELLED | OUTPATIENT
Start: 2021-04-11 | End: 2021-04-11

## 2021-04-11 NOTE — H&P
[address] [facility] [address] Medicine  History and Physical Exam    Patient: Micky Wang MRN: [id number]  SSN: [id number]    YOB: 1960  Age: 63 y.o. Sex: female      Subjective:      Chief Complaint: Left knee pain    History of Present Illness:  Patient complains of pain to the left knee and difficulty ambulating, which has progressively worsened over several months. She is s/p left uni knee replacement in 2016. X-rays showed progressive osteoarthritis of the joint. The patient's pain has persisted and progressed despite conservative treatments and therapies. The patient has been previously treated with medications and/or injections. The patient has at this time opted for surgical intervention.        Past Medical History:   Diagnosis Date    Adverse effect of anesthesia     small airway, difficult intubation, pt requesting anesthesia consult    Asthma     seasonal; no inhaler    Borderline diabetes     Chronic pain     lower back, arms and legs    Depression     Diabetes (HCC)     diet control and exercise    Difficult intubation     GERD (gastroesophageal reflux disease)     H/O seasonal allergies     Hypertension     no meds at this time    Morbid obesity ([address]     Morbid obesity with BMI of 40.0-44.9, adult ([address]     Multiple sclerosis ([address]     Osteoarthritis of left knee 2016    Osteoarthritis of right knee     Overactive bladder     Rheumatoid arthritis ([address]     patient denies    Thyroid cancer ([address]      Past Surgical History:   Procedure Laterality Date    HX  SECTION   &     HX GASTRIC BYPASS      gastric sleeve    HX HERNIA REPAIR      umbilical x2    HX HYSTERECTOMY      RYNE    HX LAP CHOLECYSTECTOMY      -  [address]    HX ORTHOPAEDIC Bilateral     partial knee replacement    HX ORTHOPAEDIC Left     knee \"ligament repair\"    HX OTHER SURGICAL      fatty tumor removal left arm    HX [facility]ROIDECTOMY      [facility]- [facility]    HX TONSILLECTOMY      [place] LAP, RADHA RESTRICT PROC, LONGITUDINAL GASTRECTOMY      2017     Social History     Occupational History    Not on file   Tobacco Use    Smoking status: Former Smoker     Quit date: 3/9/2002     Years since quittin.1    Smokeless tobacco: Never Used   Substance and Sexual Activity    Alcohol use: Yes     Comment: 2 alcohol beverages per month    Drug use: Yes     Types: Marijuana     Comment: edibles; patient advised to hold for 7 days prior to DOS.  Sexual activity: Yes     Prior to Admission medications    Medication Sig Start Date End Date Taking? Authorizing Provider   Flaxseed Oil oil by Does Not Apply route daily. Provider, Historical   cyanocobalamin (Vitamin B-12) 500 mcg tablet Take 500 mcg by mouth daily. Provider, Historical   fluticasone propionate (FLONASE NA) by Nasal route. Provider, Historical   atorvastatin (Lipitor) 10 mg tablet Take  by mouth nightly. Provider, Historical   pantoprazole (Protonix) 20 mg tablet Take 40 mg by mouth daily. Provider, Historical   ocrelizumab (OCREVUS) 30 mg/mL soln injection by IntraVENous route. q6months    Provider, Historical   cranberry extract 450 mg tab tablet Take 450 mg by mouth. Provider, Historical   omega 3-dha-epa-fish oil (FISH OIL) 100-160-1,000 mg cap Take  by mouth. Provider, Historical   cyanocobalamin (VITAMIN B-12) 1,000 mcg tablet Take 1,000 mcg by mouth daily. Provider, Historical   calcium-cholecalciferol, d3, (CALCIUM 600 + D) 600-125 mg-unit tab Take  by mouth. Provider, Historical   cholecalciferol (VITAMIN D3) 1,000 unit cap Take  by mouth daily. Provider, Historical   b complex vitamins (B COMPLEX 1) tablet Take 1 Tab by mouth daily. Provider, Historical   levothyroxine (SYNTHROID) 112 mcg tablet Take 100 mcg by mouth Daily (before breakfast).  Indications: additional treatment for thyroid cancer    Provider, Historical   acetaminophen (TYLENOL EXTRA STRENGTH) 500 mg tablet Take 1,000 mg by mouth daily. Provider, Historical   multivitamin (ONE A DAY) tablet Take 1 Tab by mouth daily. Provider, Historical   gabapentin (NEURONTIN) 300 mg capsule Take 600 mg by mouth two (2) times a day. Provider, Historical   zolpidem (AMBIEN) 10 mg tablet Take 5 mg by mouth nightly as needed for Sleep. Provider, Historical   baclofen (LIORESAL) 10 mg tablet Take 10 mg by mouth two (2) times a day. Indications: MS    Provider, Historical   cycloSPORINE (RESTASIS) 0.05 % ophthalmic emulsion Administer 1 Drop to both eyes two (2) times a day. Provider, Historical   EPINEPHrine (EPIPEN) 0.3 mg/0.3 mL (1:1,000) injection 0.3 mg by IntraMUSCular route once as needed for Anaphylaxis (Iodine/Shellfish). Indications: ANAPHYLAXIS    Provider, Historical   DULoxetine (CYMBALTA) 60 mg capsule Take 60 mg by mouth nightly. Indications: ANXIETY WITH DEPRESSION, NEUROPATHIC PAIN    Provider, Historical       Allergies: Allergies   Allergen Reactions    Shellfish Derived Shortness of Breath and Swelling    Adhesive Tape-Silicones Other (comments)     Skin peels and blister    Dilaudid [Hydromorphone] Other (comments)     \"uncontrollable crying\"    Sulfa (Sulfonamide Antibiotics) Itching        Review of Systems:  Pertinent items are noted in the History of Present Illness. Objective:       Physical Exam:  HEENT: Normocephalic, atraumatic  Lungs:  Clear to auscultation  Heart:   Regular rate and rhythm  Abdomen: Soft  Extremities:  Pain with range of motion of the left knee. Active ROM limited due to pain. Tenderness generalized. Crepitus present. Antalgic gait. Assessment:      Arthritis of the left knee. Plan:       Proceed with scheduled REVISION LEFT TOTAL KNEE ARTHROPLASTY - CONVERT UNI TOTAL.      The patient would benefit from the use of Vancomycin for antibiotic prophylaxis due to increased risk of infection. The various methods of treatment have been discussed with the patient and family. After consideration of risks, benefits, and other options for treatment, the patient has consented to surgical interventions. Questions were answered and preoperative teaching was done by Dr Susan Bae.      Signed By: Latosha Yip     April 11, 2021

## 2021-04-12 ENCOUNTER — HOSPITAL ENCOUNTER (OUTPATIENT)
Age: 61
LOS: 1 days | Discharge: HOME HEALTH | End: 2021-04-12
Attending: ORTHOPAEDIC SURGERY | Admitting: ORTHOPAEDIC SURGERY
Payer: MEDICARE

## 2021-04-12 ENCOUNTER — APPOINTMENT (OUTPATIENT)
Dept: GENERAL RADIOLOGY | Age: 61
End: 2021-04-12
Attending: PHYSICIAN ASSISTANT
Payer: MEDICARE

## 2021-04-12 ENCOUNTER — ANESTHESIA (OUTPATIENT)
Dept: SURGERY | Age: 61
End: 2021-04-12
Payer: MEDICARE

## 2021-04-12 ENCOUNTER — HOME HEALTH ADMISSION (OUTPATIENT)
Dept: HOME HEALTH SERVICES | Facility: HOME HEALTH | Age: 61
End: 2021-04-12
Payer: MEDICARE

## 2021-04-12 VITALS
RESPIRATION RATE: 16 BRPM | WEIGHT: 211.9 LBS | BODY MASS INDEX: 34.06 KG/M2 | DIASTOLIC BLOOD PRESSURE: 57 MMHG | HEIGHT: 66 IN | HEART RATE: 85 BPM | OXYGEN SATURATION: 97 % | TEMPERATURE: 97.6 F | SYSTOLIC BLOOD PRESSURE: 112 MMHG

## 2021-04-12 DIAGNOSIS — M17.12 PRIMARY OSTEOARTHRITIS OF LEFT KNEE: Primary | Chronic | ICD-10-CM

## 2021-04-12 LAB
ABO + RH BLD: NORMAL
BLOOD GROUP ANTIBODIES SERPL: NORMAL
GLUCOSE BLD STRIP.AUTO-MCNC: 100 MG/DL (ref 70–110)
GLUCOSE BLD STRIP.AUTO-MCNC: 123 MG/DL (ref 70–110)
SPECIMEN EXP DATE BLD: NORMAL

## 2021-04-12 PROCEDURE — C1713 ANCHOR/SCREW BN/BN,TIS/BN: HCPCS | Performed by: ORTHOPAEDIC SURGERY

## 2021-04-12 PROCEDURE — 77030027138 HC INCENT SPIROMETER -A: Performed by: ORTHOPAEDIC SURGERY

## 2021-04-12 PROCEDURE — 77030031139 HC SUT VCRL2 J&J -A: Performed by: ORTHOPAEDIC SURGERY

## 2021-04-12 PROCEDURE — 74011250637 HC RX REV CODE- 250/637: Performed by: ANESTHESIOLOGY

## 2021-04-12 PROCEDURE — 74011250637 HC RX REV CODE- 250/637: Performed by: PHYSICIAN ASSISTANT

## 2021-04-12 PROCEDURE — 77030034694 HC SCPL CANADY PLSM DISP USMD -E: Performed by: ORTHOPAEDIC SURGERY

## 2021-04-12 PROCEDURE — 77030012508 HC MSK AIRWY LMA AMBU -A: Performed by: ANESTHESIOLOGY

## 2021-04-12 PROCEDURE — 76210000016 HC OR PH I REC 1 TO 1.5 HR: Performed by: ORTHOPAEDIC SURGERY

## 2021-04-12 PROCEDURE — 77030037713 HC CLOSR DEV INCIS ZIP STRY -B: Performed by: ORTHOPAEDIC SURGERY

## 2021-04-12 PROCEDURE — C1776 JOINT DEVICE (IMPLANTABLE): HCPCS | Performed by: ORTHOPAEDIC SURGERY

## 2021-04-12 PROCEDURE — 36415 COLL VENOUS BLD VENIPUNCTURE: CPT

## 2021-04-12 PROCEDURE — 74011250636 HC RX REV CODE- 250/636: Performed by: PHYSICIAN ASSISTANT

## 2021-04-12 PROCEDURE — 77030002934 HC SUT MCRYL J&J -B: Performed by: ORTHOPAEDIC SURGERY

## 2021-04-12 PROCEDURE — 73560 X-RAY EXAM OF KNEE 1 OR 2: CPT

## 2021-04-12 PROCEDURE — 74011000258 HC RX REV CODE- 258: Performed by: ORTHOPAEDIC SURGERY

## 2021-04-12 PROCEDURE — 74011250636 HC RX REV CODE- 250/636: Performed by: NURSE ANESTHETIST, CERTIFIED REGISTERED

## 2021-04-12 PROCEDURE — 2709999900 HC NON-CHARGEABLE SUPPLY: Performed by: ORTHOPAEDIC SURGERY

## 2021-04-12 PROCEDURE — 74011000250 HC RX REV CODE- 250: Performed by: ANESTHESIOLOGY

## 2021-04-12 PROCEDURE — 97535 SELF CARE MNGMENT TRAINING: CPT

## 2021-04-12 PROCEDURE — 77030038010: Performed by: ORTHOPAEDIC SURGERY

## 2021-04-12 PROCEDURE — 77010033678 HC OXYGEN DAILY

## 2021-04-12 PROCEDURE — 77030013708 HC HNDPC SUC IRR PULS STRY –B: Performed by: ORTHOPAEDIC SURGERY

## 2021-04-12 PROCEDURE — 64447 NJX AA&/STRD FEMORAL NRV IMG: CPT | Performed by: ORTHOPAEDIC SURGERY

## 2021-04-12 PROCEDURE — 74011250637 HC RX REV CODE- 250/637: Performed by: ORTHOPAEDIC SURGERY

## 2021-04-12 PROCEDURE — 97165 OT EVAL LOW COMPLEX 30 MIN: CPT

## 2021-04-12 PROCEDURE — 74011250636 HC RX REV CODE- 250/636: Performed by: ANESTHESIOLOGY

## 2021-04-12 PROCEDURE — 77030020782 HC GWN BAIR PAWS FLX 3M -B: Performed by: ORTHOPAEDIC SURGERY

## 2021-04-12 PROCEDURE — 76010000153 HC OR TIME 1.5 TO 2 HR: Performed by: ORTHOPAEDIC SURGERY

## 2021-04-12 PROCEDURE — 77030012893: Performed by: ORTHOPAEDIC SURGERY

## 2021-04-12 PROCEDURE — 74011250636 HC RX REV CODE- 250/636: Performed by: ORTHOPAEDIC SURGERY

## 2021-04-12 PROCEDURE — 77030006802 HC BLD SAW RECIP BRSM -B: Performed by: ORTHOPAEDIC SURGERY

## 2021-04-12 PROCEDURE — 77030003666 HC NDL SPINAL BD -A: Performed by: ORTHOPAEDIC SURGERY

## 2021-04-12 PROCEDURE — 74011000250 HC RX REV CODE- 250: Performed by: ORTHOPAEDIC SURGERY

## 2021-04-12 PROCEDURE — 76060000034 HC ANESTHESIA 1.5 TO 2 HR: Performed by: ORTHOPAEDIC SURGERY

## 2021-04-12 PROCEDURE — 86901 BLOOD TYPING SEROLOGIC RH(D): CPT

## 2021-04-12 PROCEDURE — 74011000250 HC RX REV CODE- 250: Performed by: NURSE ANESTHETIST, CERTIFIED REGISTERED

## 2021-04-12 PROCEDURE — 82962 GLUCOSE BLOOD TEST: CPT

## 2021-04-12 PROCEDURE — 76942 ECHO GUIDE FOR BIOPSY: CPT | Performed by: ORTHOPAEDIC SURGERY

## 2021-04-12 PROCEDURE — 76210000023 HC REC RM PH II 2 TO 2.5 HR

## 2021-04-12 PROCEDURE — 77030033263 HC DRSG MEPILEX 16-48IN BORD MOLN -B: Performed by: ORTHOPAEDIC SURGERY

## 2021-04-12 PROCEDURE — 77030039760: Performed by: ORTHOPAEDIC SURGERY

## 2021-04-12 PROCEDURE — 97161 PT EVAL LOW COMPLEX 20 MIN: CPT

## 2021-04-12 DEVICE — CEMENT BNE 40GM FULL DOSE PMMA W/O ANTIBIO HI VISC N RADPQ: Type: IMPLANTABLE DEVICE | Site: KNEE | Status: FUNCTIONAL

## 2021-04-12 DEVICE — SIZE 4 10MM TIBIAL INSERT UC
Type: IMPLANTABLE DEVICE | Site: KNEE | Status: FUNCTIONAL
Brand: BKS E-VITALIZE

## 2021-04-12 DEVICE — 29MM PATELLA, VITAMIN E
Type: IMPLANTABLE DEVICE | Site: KNEE | Status: FUNCTIONAL
Brand: BKS E-VITALIZE

## 2021-04-12 DEVICE — KNEE K1 TOT HEMI STD CEM IMPL CAPPED K1 OD: Type: IMPLANTABLE DEVICE | Site: KNEE | Status: FUNCTIONAL

## 2021-04-12 DEVICE — SIZE 4 TIBIAL TRAY NONPOROUS
Type: IMPLANTABLE DEVICE | Site: KNEE | Status: FUNCTIONAL
Brand: BALANCED KNEE SYSTEM

## 2021-04-12 DEVICE — LT SIZE 4 NARROW FEMORAL CR NONPOROUS
Type: IMPLANTABLE DEVICE | Site: KNEE | Status: FUNCTIONAL
Brand: BKS TRIMAX

## 2021-04-12 RX ORDER — SODIUM CHLORIDE 9 MG/ML
125 INJECTION, SOLUTION INTRAVENOUS CONTINUOUS
Status: DISCONTINUED | OUTPATIENT
Start: 2021-04-12 | End: 2021-04-12 | Stop reason: HOSPADM

## 2021-04-12 RX ORDER — KETOROLAC TROMETHAMINE 15 MG/ML
INJECTION, SOLUTION INTRAMUSCULAR; INTRAVENOUS AS NEEDED
Status: DISCONTINUED | OUTPATIENT
Start: 2021-04-12 | End: 2021-04-12 | Stop reason: HOSPADM

## 2021-04-12 RX ORDER — ONDANSETRON 2 MG/ML
INJECTION INTRAMUSCULAR; INTRAVENOUS AS NEEDED
Status: DISCONTINUED | OUTPATIENT
Start: 2021-04-12 | End: 2021-04-12 | Stop reason: HOSPADM

## 2021-04-12 RX ORDER — SODIUM CHLORIDE, SODIUM LACTATE, POTASSIUM CHLORIDE, CALCIUM CHLORIDE 600; 310; 30; 20 MG/100ML; MG/100ML; MG/100ML; MG/100ML
100 INJECTION, SOLUTION INTRAVENOUS CONTINUOUS
Status: DISCONTINUED | OUTPATIENT
Start: 2021-04-12 | End: 2021-04-12 | Stop reason: HOSPADM

## 2021-04-12 RX ORDER — NALOXONE HYDROCHLORIDE 0.4 MG/ML
0.4 INJECTION, SOLUTION INTRAMUSCULAR; INTRAVENOUS; SUBCUTANEOUS AS NEEDED
Status: DISCONTINUED | OUTPATIENT
Start: 2021-04-12 | End: 2021-04-12 | Stop reason: HOSPADM

## 2021-04-12 RX ORDER — CEFAZOLIN SODIUM/WATER 2 G/20 ML
2 SYRINGE (ML) INTRAVENOUS ONCE
Status: COMPLETED | OUTPATIENT
Start: 2021-04-12 | End: 2021-04-12

## 2021-04-12 RX ORDER — FENTANYL CITRATE 50 UG/ML
INJECTION, SOLUTION INTRAMUSCULAR; INTRAVENOUS AS NEEDED
Status: DISCONTINUED | OUTPATIENT
Start: 2021-04-12 | End: 2021-04-12 | Stop reason: HOSPADM

## 2021-04-12 RX ORDER — ROPIVACAINE HYDROCHLORIDE 5 MG/ML
INJECTION, SOLUTION EPIDURAL; INFILTRATION; PERINEURAL
Status: SHIPPED | OUTPATIENT
Start: 2021-04-12 | End: 2021-04-12

## 2021-04-12 RX ORDER — VANCOMYCIN/0.9 % SOD CHLORIDE 1.5G/250ML
1500 PLASTIC BAG, INJECTION (ML) INTRAVENOUS ONCE
Status: COMPLETED | OUTPATIENT
Start: 2021-04-12 | End: 2021-04-12

## 2021-04-12 RX ORDER — KETAMINE HYDROCHLORIDE 10 MG/ML
INJECTION, SOLUTION INTRAMUSCULAR; INTRAVENOUS AS NEEDED
Status: DISCONTINUED | OUTPATIENT
Start: 2021-04-12 | End: 2021-04-12 | Stop reason: HOSPADM

## 2021-04-12 RX ORDER — DIPHENHYDRAMINE HYDROCHLORIDE 50 MG/ML
12.5 INJECTION, SOLUTION INTRAMUSCULAR; INTRAVENOUS
Status: DISCONTINUED | OUTPATIENT
Start: 2021-04-12 | End: 2021-04-12 | Stop reason: HOSPADM

## 2021-04-12 RX ORDER — DEXMEDETOMIDINE HYDROCHLORIDE 100 UG/ML
INJECTION, SOLUTION INTRAVENOUS
Status: SHIPPED | OUTPATIENT
Start: 2021-04-12 | End: 2021-04-12

## 2021-04-12 RX ORDER — ONDANSETRON 2 MG/ML
4 INJECTION INTRAMUSCULAR; INTRAVENOUS
Status: DISCONTINUED | OUTPATIENT
Start: 2021-04-12 | End: 2021-04-12 | Stop reason: HOSPADM

## 2021-04-12 RX ORDER — NALOXONE HYDROCHLORIDE 0.4 MG/ML
0.1 INJECTION, SOLUTION INTRAMUSCULAR; INTRAVENOUS; SUBCUTANEOUS AS NEEDED
Status: DISCONTINUED | OUTPATIENT
Start: 2021-04-12 | End: 2021-04-12 | Stop reason: HOSPADM

## 2021-04-12 RX ORDER — SODIUM CHLORIDE 0.9 % (FLUSH) 0.9 %
5-40 SYRINGE (ML) INJECTION EVERY 8 HOURS
Status: DISCONTINUED | OUTPATIENT
Start: 2021-04-12 | End: 2021-04-12 | Stop reason: HOSPADM

## 2021-04-12 RX ORDER — ALBUTEROL SULFATE 0.83 MG/ML
2.5 SOLUTION RESPIRATORY (INHALATION)
Status: DISCONTINUED | OUTPATIENT
Start: 2021-04-12 | End: 2021-04-12 | Stop reason: HOSPADM

## 2021-04-12 RX ORDER — OXYCODONE HYDROCHLORIDE 5 MG/1
5 TABLET ORAL
Qty: 42 TAB | Refills: 0 | Status: SHIPPED | OUTPATIENT
Start: 2021-04-12 | End: 2021-04-19

## 2021-04-12 RX ORDER — DOCUSATE SODIUM 100 MG/1
100 CAPSULE, LIQUID FILLED ORAL DAILY
Status: DISCONTINUED | OUTPATIENT
Start: 2021-04-12 | End: 2021-04-12 | Stop reason: HOSPADM

## 2021-04-12 RX ORDER — ASPIRIN 81 MG/1
81 TABLET ORAL 2 TIMES DAILY
Qty: 42 TAB | Refills: 0 | Status: SHIPPED | OUTPATIENT
Start: 2021-04-12 | End: 2021-05-03

## 2021-04-12 RX ORDER — KETOROLAC TROMETHAMINE 30 MG/ML
15 INJECTION, SOLUTION INTRAMUSCULAR; INTRAVENOUS EVERY 6 HOURS
Status: DISCONTINUED | OUTPATIENT
Start: 2021-04-12 | End: 2021-04-12 | Stop reason: HOSPADM

## 2021-04-12 RX ORDER — LANOLIN ALCOHOL/MO/W.PET/CERES
1 CREAM (GRAM) TOPICAL 3 TIMES DAILY
Status: DISCONTINUED | OUTPATIENT
Start: 2021-04-12 | End: 2021-04-12 | Stop reason: HOSPADM

## 2021-04-12 RX ORDER — CEFAZOLIN SODIUM/WATER 2 G/20 ML
2 SYRINGE (ML) INTRAVENOUS EVERY 8 HOURS
Status: DISCONTINUED | OUTPATIENT
Start: 2021-04-12 | End: 2021-04-12 | Stop reason: HOSPADM

## 2021-04-12 RX ORDER — PANTOPRAZOLE SODIUM 40 MG/1
40 TABLET, DELAYED RELEASE ORAL DAILY
Status: DISCONTINUED | OUTPATIENT
Start: 2021-04-12 | End: 2021-04-12 | Stop reason: HOSPADM

## 2021-04-12 RX ORDER — ASPIRIN 81 MG/1
81 TABLET ORAL 2 TIMES DAILY
Status: DISCONTINUED | OUTPATIENT
Start: 2021-04-12 | End: 2021-04-12 | Stop reason: HOSPADM

## 2021-04-12 RX ORDER — ACETAMINOPHEN 500 MG
1000 TABLET ORAL ONCE
Status: COMPLETED | OUTPATIENT
Start: 2021-04-12 | End: 2021-04-12

## 2021-04-12 RX ORDER — MIDAZOLAM HYDROCHLORIDE 1 MG/ML
INJECTION, SOLUTION INTRAMUSCULAR; INTRAVENOUS AS NEEDED
Status: DISCONTINUED | OUTPATIENT
Start: 2021-04-12 | End: 2021-04-12 | Stop reason: HOSPADM

## 2021-04-12 RX ORDER — SODIUM CHLORIDE 0.9 % (FLUSH) 0.9 %
5-40 SYRINGE (ML) INJECTION AS NEEDED
Status: DISCONTINUED | OUTPATIENT
Start: 2021-04-12 | End: 2021-04-12 | Stop reason: HOSPADM

## 2021-04-12 RX ORDER — VANCOMYCIN/0.9 % SOD CHLORIDE 1.5G/250ML
1500 PLASTIC BAG, INJECTION (ML) INTRAVENOUS EVERY 12 HOURS
Status: DISCONTINUED | OUTPATIENT
Start: 2021-04-12 | End: 2021-04-12 | Stop reason: HOSPADM

## 2021-04-12 RX ORDER — HYDROMORPHONE HYDROCHLORIDE 1 MG/ML
0.5 INJECTION, SOLUTION INTRAMUSCULAR; INTRAVENOUS; SUBCUTANEOUS
Status: DISCONTINUED | OUTPATIENT
Start: 2021-04-12 | End: 2021-04-12 | Stop reason: HOSPADM

## 2021-04-12 RX ORDER — GABAPENTIN 300 MG/1
300 CAPSULE ORAL ONCE
Status: DISCONTINUED | OUTPATIENT
Start: 2021-04-12 | End: 2021-04-12

## 2021-04-12 RX ORDER — ACETAMINOPHEN 325 MG/1
650 TABLET ORAL EVERY 6 HOURS
Status: DISCONTINUED | OUTPATIENT
Start: 2021-04-12 | End: 2021-04-12 | Stop reason: HOSPADM

## 2021-04-12 RX ORDER — TRANEXAMIC ACID 10 MG/ML
1 INJECTION, SOLUTION INTRAVENOUS
Status: COMPLETED | OUTPATIENT
Start: 2021-04-12 | End: 2021-04-12

## 2021-04-12 RX ORDER — SODIUM CHLORIDE 9 MG/ML
300 INJECTION, SOLUTION INTRAVENOUS CONTINUOUS
Status: DISCONTINUED | OUTPATIENT
Start: 2021-04-12 | End: 2021-04-12 | Stop reason: HOSPADM

## 2021-04-12 RX ORDER — GABAPENTIN 300 MG/1
600 CAPSULE ORAL ONCE
Status: COMPLETED | OUTPATIENT
Start: 2021-04-12 | End: 2021-04-12

## 2021-04-12 RX ORDER — SODIUM CHLORIDE, SODIUM LACTATE, POTASSIUM CHLORIDE, CALCIUM CHLORIDE 600; 310; 30; 20 MG/100ML; MG/100ML; MG/100ML; MG/100ML
125 INJECTION, SOLUTION INTRAVENOUS CONTINUOUS
Status: DISCONTINUED | OUTPATIENT
Start: 2021-04-12 | End: 2021-04-12 | Stop reason: HOSPADM

## 2021-04-12 RX ORDER — OXYCODONE HYDROCHLORIDE 5 MG/1
5-10 TABLET ORAL
Status: DISCONTINUED | OUTPATIENT
Start: 2021-04-12 | End: 2021-04-12 | Stop reason: HOSPADM

## 2021-04-12 RX ORDER — CEFADROXIL 500 MG/1
500 CAPSULE ORAL 2 TIMES DAILY
Qty: 10 CAP | Refills: 0 | Status: SHIPPED | OUTPATIENT
Start: 2021-04-12 | End: 2021-04-17

## 2021-04-12 RX ORDER — GLYCOPYRROLATE 0.2 MG/ML
INJECTION INTRAMUSCULAR; INTRAVENOUS AS NEEDED
Status: DISCONTINUED | OUTPATIENT
Start: 2021-04-12 | End: 2021-04-12 | Stop reason: HOSPADM

## 2021-04-12 RX ORDER — HYDROMORPHONE HYDROCHLORIDE 1 MG/ML
0.2 INJECTION, SOLUTION INTRAMUSCULAR; INTRAVENOUS; SUBCUTANEOUS ONCE
Status: DISCONTINUED | OUTPATIENT
Start: 2021-04-12 | End: 2021-04-12 | Stop reason: HOSPADM

## 2021-04-12 RX ORDER — DEXAMETHASONE SODIUM PHOSPHATE 100 MG/10ML
INJECTION INTRAMUSCULAR; INTRAVENOUS AS NEEDED
Status: DISCONTINUED | OUTPATIENT
Start: 2021-04-12 | End: 2021-04-12 | Stop reason: HOSPADM

## 2021-04-12 RX ORDER — PROPOFOL 10 MG/ML
INJECTION, EMULSION INTRAVENOUS AS NEEDED
Status: DISCONTINUED | OUTPATIENT
Start: 2021-04-12 | End: 2021-04-12 | Stop reason: HOSPADM

## 2021-04-12 RX ORDER — GABAPENTIN 300 MG/1
300 CAPSULE ORAL ONCE
Status: DISCONTINUED | OUTPATIENT
Start: 2021-04-12 | End: 2021-04-12 | Stop reason: SDUPTHER

## 2021-04-12 RX ORDER — DEXMEDETOMIDINE HYDROCHLORIDE 100 UG/ML
INJECTION, SOLUTION INTRAVENOUS AS NEEDED
Status: DISCONTINUED | OUTPATIENT
Start: 2021-04-12 | End: 2021-04-12 | Stop reason: HOSPADM

## 2021-04-12 RX ORDER — ZOLPIDEM TARTRATE 5 MG/1
5-10 TABLET ORAL
Status: DISCONTINUED | OUTPATIENT
Start: 2021-04-12 | End: 2021-04-12 | Stop reason: HOSPADM

## 2021-04-12 RX ORDER — LIDOCAINE HYDROCHLORIDE 20 MG/ML
INJECTION, SOLUTION EPIDURAL; INFILTRATION; INTRACAUDAL; PERINEURAL AS NEEDED
Status: DISCONTINUED | OUTPATIENT
Start: 2021-04-12 | End: 2021-04-12 | Stop reason: HOSPADM

## 2021-04-12 RX ADMIN — KETOROLAC TROMETHAMINE 15 MG: 30 INJECTION, SOLUTION INTRAMUSCULAR at 11:08

## 2021-04-12 RX ADMIN — DEXMEDETOMIDINE HYDROCHLORIDE 16 MCG: 100 INJECTION, SOLUTION INTRAVENOUS at 08:14

## 2021-04-12 RX ADMIN — MIDAZOLAM 2 MG: 1 INJECTION INTRAMUSCULAR; INTRAVENOUS at 08:05

## 2021-04-12 RX ADMIN — DEXMEDETOMIDINE HYDROCHLORIDE 10 MCG: 100 INJECTION, SOLUTION INTRAVENOUS at 07:25

## 2021-04-12 RX ADMIN — TRANEXAMIC ACID 1 G: 10 INJECTION, SOLUTION INTRAVENOUS at 09:15

## 2021-04-12 RX ADMIN — DEXMEDETOMIDINE HYDROCHLORIDE 10 MCG: 100 INJECTION, SOLUTION INTRAVENOUS at 07:23

## 2021-04-12 RX ADMIN — TRANEXAMIC ACID 1 G: 10 INJECTION, SOLUTION INTRAVENOUS at 08:14

## 2021-04-12 RX ADMIN — SODIUM CHLORIDE 300 ML/HR: 900 INJECTION, SOLUTION INTRAVENOUS at 11:10

## 2021-04-12 RX ADMIN — GLYCOPYRROLATE 0.2 MG: 0.2 INJECTION INTRAMUSCULAR; INTRAVENOUS at 08:05

## 2021-04-12 RX ADMIN — FENTANYL CITRATE 100 MCG: 50 INJECTION, SOLUTION INTRAMUSCULAR; INTRAVENOUS at 08:12

## 2021-04-12 RX ADMIN — KETOROLAC TROMETHAMINE 30 MG: 15 INJECTION, SOLUTION INTRAMUSCULAR; INTRAVENOUS at 09:13

## 2021-04-12 RX ADMIN — PROPOFOL 200 MG: 10 INJECTION, EMULSION INTRAVENOUS at 08:12

## 2021-04-12 RX ADMIN — DEXAMETHASONE SODIUM PHOSPHATE 2 MG: 10 INJECTION INTRAMUSCULAR; INTRAVENOUS at 07:25

## 2021-04-12 RX ADMIN — CEFAZOLIN 2 G: 10 INJECTION, POWDER, FOR SOLUTION INTRAVENOUS at 07:29

## 2021-04-12 RX ADMIN — LIDOCAINE HYDROCHLORIDE 80 MG: 20 INJECTION, SOLUTION EPIDURAL; INFILTRATION; INTRACAUDAL; PERINEURAL at 08:12

## 2021-04-12 RX ADMIN — SODIUM CHLORIDE, SODIUM LACTATE, POTASSIUM CHLORIDE, AND CALCIUM CHLORIDE 1000 ML: 600; 310; 30; 20 INJECTION, SOLUTION INTRAVENOUS at 06:45

## 2021-04-12 RX ADMIN — VANCOMYCIN HYDROCHLORIDE 1500 MG: 10 INJECTION, POWDER, LYOPHILIZED, FOR SOLUTION INTRAVENOUS at 07:42

## 2021-04-12 RX ADMIN — KETAMINE HYDROCHLORIDE 50 MG: 10 INJECTION, SOLUTION INTRAMUSCULAR; INTRAVENOUS at 08:12

## 2021-04-12 RX ADMIN — PANTOPRAZOLE SODIUM 40 MG: 40 TABLET, DELAYED RELEASE ORAL at 07:06

## 2021-04-12 RX ADMIN — MIDAZOLAM 2 MG: 1 INJECTION INTRAMUSCULAR; INTRAVENOUS at 07:17

## 2021-04-12 RX ADMIN — ACETAMINOPHEN 650 MG: 325 TABLET, FILM COATED ORAL at 11:08

## 2021-04-12 RX ADMIN — ROPIVACAINE HYDROCHLORIDE 20 ML: 5 INJECTION, SOLUTION EPIDURAL; INFILTRATION; PERINEURAL at 07:23

## 2021-04-12 RX ADMIN — ONDANSETRON HYDROCHLORIDE 4 MG: 2 INJECTION INTRAMUSCULAR; INTRAVENOUS at 08:14

## 2021-04-12 RX ADMIN — ACETAMINOPHEN 1000 MG: 500 TABLET ORAL at 07:06

## 2021-04-12 RX ADMIN — DEXAMETHASONE SODIUM PHOSPHATE 2 MG: 10 INJECTION INTRAMUSCULAR; INTRAVENOUS at 07:23

## 2021-04-12 RX ADMIN — SODIUM CHLORIDE, SODIUM LACTATE, POTASSIUM CHLORIDE, AND CALCIUM CHLORIDE 125 ML/HR: 600; 310; 30; 20 INJECTION, SOLUTION INTRAVENOUS at 06:45

## 2021-04-12 RX ADMIN — GABAPENTIN 600 MG: 300 CAPSULE ORAL at 07:06

## 2021-04-12 NOTE — ANESTHESIA PREPROCEDURE EVALUATION
Relevant Problems   No relevant active problems       Anesthetic History     Increased risk of difficult airway          Review of Systems / Medical History  Patient summary reviewed and pertinent labs reviewed    Pulmonary            Asthma : well controlled  Pertinent negatives: No sleep apnea and smoker     Neuro/Psych         Psychiatric history     Cardiovascular    Hypertension              Exercise tolerance: >4 METS     GI/Hepatic/Renal     GERD: well controlled        Pertinent negatives: No hiatal hernia   Endo/Other    Diabetes: well controlled, type 2  Hypothyroidism: well controlled  Arthritis  Pertinent negatives: No morbid obesity   Other Findings              Physical Exam    Airway  Mallampati: III  TM Distance: > 6 cm  Neck ROM: normal range of motion   Mouth opening: Normal     Cardiovascular    Rhythm: regular  Rate: normal         Dental         Pulmonary  Breath sounds clear to auscultation               Abdominal  GI exam deferred       Other Findings            Anesthetic Plan    ASA: 2  Anesthesia type: general      Post-op pain plan if not by surgeon: peripheral nerve block single    Induction: Intravenous  Anesthetic plan and risks discussed with: Patient

## 2021-04-12 NOTE — DISCHARGE INSTRUCTIONS
[address] [place] [address] Sports Medicine   Patient Discharge Instructions    Maria Dolores Arriaga / [id number] : 1960    Admitted 2021 Discharged: 2021     IF YOU HAVE ANY PROBLEMS ONCE YOU ARE AT HOME CALL THE FOLLOWING NUMBERS:   Main office number: [phone]    Your follow up appointment to see either Dr. Braden Vasquez PA-C, or [facility] as scheduled in 2 weeks. If you are unsure of your appointment date call the office at [phone]. Medication Instructions     · Resume your home medictions as directed, you may have directed not to resume supplements until after your follow up. · A prescription for pain medication has been given   · It is important that you take the medication exactly as they are prescribed. · Keep your medication in the bottles provided by the pharmacist and keep a list of the medication names, dosages, and times to be taken in your wallet. · Do not take other medications without consulting your doctor. What to do at [address] [place] [address] Ave your prehospital diet. If you have excessive nausea or vomitting call your doctor's office. Be sure to maintain adequate fluid intake. Some pain medications may cause constipation. Remember to drink fluids, stay as active as possible, and eat plenty of fiber-rich foods. Begin In-Home Physical Therapy; 3 times a week to work on gait training, range of motion, strengthening, and weight bearing exercises as tolerable. Continue to use your walker or cane when walking. May progress from the walker to a cane to complete total bearing as tolerable. Patient may shower. Wrap incision with plastic wrap/covering to prevent incision from getting wet. Avoid complete immersion. YOUR DRESSING SHOULD BE CHANGED BY YOUR HOME HEALTH NURSE 5-7 AFTER SURGERY ACCORDING TO THE DATE WRITTEN ON YOUR DRESSING.       When to Call    - Call if you have a temperature greater then 101  - Unable to keep food down  - Are unable to bear any wieght   - Need a pain medication refill     Information obtained by :  I understand that if any problems occur once I am at home I am to contact my physician. I understand and acknowledge receipt of the instructions indicated above.                                                                                                                                            Physician's or R.N.'s Signature                                                                  Date/Time                                                                                                                                              Patient or Representative Signature                                                          Date/Time

## 2021-04-12 NOTE — PROGRESS NOTES
Problem: Self Care Deficits Care Plan (Adult)  Goal: *Acute Goals and Plan of Care (Insert Text)  Description: Initial Occupational Therapy Goals (4/12/2021) Within 7 day(s):    1. Patient will perform grooming standing sinkside with supervision for increased independence with ADLs. 2. Patient will perform LB dressing with supervision & A/E PRN for increased independence with ADLs. 3. Patient will perform toilet transfer with supervision for increased independence with ADLs. 4. Patient will perform all aspects of toileting with supervision for increased independence with ADLs. 5. Patient will independently apply energy conservation techniques with 1 verbal cue(s)for increased independence with ADLs. 6. Patient will perform bathroom mobility with supervision for increased independence/safety with ADLs. Outcome: Progressing Towards Goal   OCCUPATIONAL THERAPY EVALUATION    Patient: Abhi Mancilla (71 y.o. female)  Date: 4/12/2021  Primary Diagnosis: Osteoarthritis of left knee [M17.12]  Procedure(s) (LRB):  LEFT KNEE REVISION- CONVERT UNI TO TOTAL (ORTHO DEVELOPMENT) (Left) Day of Surgery   Precautions: Fall, WBAT  PLOF: pt mod I for ADLs/functional mobility    ASSESSMENT AND RECOMMENDATIONS:  Based on the objective data described below, the patient presents with LLE decreased ROM and strength affecting LE ADLs. Pt found seated in recliner chair, vitals assessed and WNL, pt reporting pain 1/10. Pt completed upper body dressing with supervision seated in chair. Pt able to thread B feet through underwear/pants without assist, and CGA when standing to pull up to waist. Pt CGA/SBA for STS/bathroom mobility with vc for proper body mechanics. Pt ambulated back to recliner, ice applied to L knee. Spouse present during session for education on home safety. Provided opportunity for pt to voice questions on ADL performance when home, pt has no further concerns.  Patient will benefit from skilled Occupational Therapy intervention to maximize safety/independence with ADLs at d/c.    Education: Reviewed home safety, body mechanics, importance of moving every hour to prevent joint stiffness, role of ice for edema/pain control, Rolling Walker management/safety, and adaptive dressing techniques with patient verbalizing  understanding at this time     Patient will benefit from skilled intervention to address the above impairments. Patient's rehabilitation potential is considered to be Good  Factors which may influence rehabilitation potential include:   [x]             None noted  []             Mental ability/status  []             Medical condition  []             Home/family situation and support systems  []             Safety awareness  []             Pain tolerance/management  []             Other:        PLAN :  Recommendations and Planned Interventions:   [x]               Self Care Training                  [x]      Therapeutic Activities  [x]               Functional Mobility Training   []      Cognitive Retraining  []               Therapeutic Exercises           []      Endurance Activities  []               Balance Training                    []      Neuromuscular Re-Education  []               Visual/Perceptual Training     [x]      Home Safety Training  [x]               Patient Education                   [x]      Family Training/Education  []               Other (comment):    Frequency/Duration: Patient will be followed by Occupational Therapy 1-2 times per day/4-7 days per week to address goals. Discharge Recommendations: Home health with adult supervision at least 24 hours after d/c  Further Equipment Recommendations for Discharge: N/A     SUBJECTIVE:   Patient stated I think this nerve block is helping me.     OBJECTIVE DATA SUMMARY:     Past Medical History:   Diagnosis Date    Adverse effect of anesthesia     small airway, difficult intubation, pt requesting anesthesia consult    Asthma     seasonal; no inhaler Borderline diabetes     Chronic pain     lower back, arms and legs    Depression     Diabetes (HCC)     diet control and exercise    Difficult intubation     GERD (gastroesophageal reflux disease)     H/O seasonal allergies     Hypertension     no meds at this time    Morbid obesity ([facility]     Morbid obesity with BMI of 40.0-44.9, adult ([facility]     Multiple sclerosis ([facility] 2008    Osteoarthritis of left knee 03/24/2016    Osteoarthritis of right knee     Overactive bladder     Rheumatoid arthritis ([facility]     patient denies    Thyroid cancer ([facility]      Past Surgical History:   Procedure Laterality Date     [address]    HX GASTRIC BYPASS      gastric sleeve    HX HERNIA REPAIR      umbilical x2    HX HYSTERECTOMY  2004    [facility]NE    HX LAP [address]LECYSTECTOMY      [address]    HX ORTHOPAEDIC Bilateral     partial knee replacement    HX ORTHOPAEDIC Left     knee \"ligament repair\"    HX OTHER SURGICAL      fatty tumor removal left arm    HX THYROIDECTOMY      2014- [facility]    HX TONSILLECTOMY      IN LAP, RADHA RESTRICT PROC, LONGITUDINAL GASTRECTOMY      9/12/2017     Barriers to Learning/Limitations: yes;  physical  Compensate with: visual, verbal, tactile, kinesthetic cues/model    Home Situation/Prior Level of Function:   Home Situation  Home Environment: Private residence  # Steps to Enter: 1  One/Two Story Residence: Two story  Interior Rails: Right  Lift Chair Available: Yes  Living Alone: No  Support Systems: Spouse/Significant Other/Partner, Family member(s)  Patient Expects to be Discharged to[de-identified] Private residence  Current DME Used/Available at Home: nabeel Leonard, [address] chair, Raised toilet seat  Tub or Shower Type: Shower  []  Right hand dominant   []  Left hand dominant    Cognitive/Behavioral Status:  Neurologic State: Alert  Orientation Level: Oriented X4  Cognition: Appropriate for age attention/concentration; Follows commands  Safety/Judgement: Fall prevention;Home safety    Skin: L knee incision w/ Mepilex   Edema: compression hose in place & applied ice     Coordination: BUE  Coordination: Within functional limits  Fine Motor Skills-Upper: Left Intact; Right Intact    Gross Motor Skills-Upper: Left Intact; Right Intact    Balance:  Sitting: Intact  Standing: Intact; With support    Strength: BUE  Strength: Generally decreased, functional    Tone & Sensation:BUE  Tone: Normal  Sensation: Impaired(L knee)    Range of Motion: BUE  AROM: Generally decreased, functional    Functional Mobility and Transfers for ADLs:  Bed Mobility:  Scooting: Stand-by assistance    Transfers:  Sit to Stand: Contact guard assistance(vc)    ADL Assessment:  Feeding: Independent  Oral Facial Hygiene/Grooming: Contact guard assistance  Bathing: Contact guard assistance  Upper Body Dressing: Supervision  Lower Body Dressing: Contact guard assistance  Toileting: Stand by assistance    ADL Intervention:  Upper Body Dressing Assistance  Dressing Assistance: Supervision  Bra: Supervision  Pullover Shirt: Supervision    Lower Body Dressing Assistance  Dressing Assistance: Contact guard assistance  Underpants: Contact guard assistance  Pants With Elastic Waist: Contact guard assistance  Leg Crossed Method Used: No  Position Performed: Seated in chair  Cues: Verbal cues provided;Visual cues provided    Cognitive Retraining  Safety/Judgement: Fall prevention;Home safety    Pain:  Pain level pre-treatment: 1/10  Pain level post-treatment: 1/10  Pain Intervention(s): Medication administer by Nursing (see MAR); Rest, Ice, Repositioning   Response to intervention: Nurse notified, see doc flow     Activity Tolerance:   Fair. Patient able to stand ~5 minute(s). Patient able to complete ADLs with intermittent rest breaks. Patient limited by pain, strength, ROM. Patient unsteady. Please refer to the flowsheet for vital signs taken during this treatment.   After treatment:   [x]  Patient left in no apparent distress sitting up in chair  []  Patient sitting on EOB  []  Patient left in no apparent distress in bed  [x]  Call bell left within reach  [x]  Nursing notified  [x]  Caregiver present  [x]  Ice applied  []  SCD's on while back in bed  [] Bed alarm activated    COMMUNICATION/EDUCATION:   Communication/Collaboration:  [x]       Role of Occupational Therapy in the acute care setting. [x]      Home safety education was provided and the patient/caregiver indicated understanding. [x]      Patient/family have participated as able in goal setting and plan of care. [x]      Patient/family agree to work toward stated goals and plan of care. []      Patient understands intent and goals of therapy, but is neutral about his/her participation. []      Patient is unable to participate in plan of care at this time. Thank you for this referral.  Aimee Mancilla, OTR/L  Time Calculation: 23 mins    Eval Complexity: History: MEDIUM Complexity : Expanded review of history including physical, cognitive and psychosocial  history ; Examination: LOW Complexity : 1-3 performance deficits relating to physical, cognitive , or psychosocial skils that result in activity limitations and / or participation restrictions ;    Decision Making:LOW Complexity : No comorbidities that affect functional and no verbal or physical assistance needed to complete eval tasks

## 2021-04-12 NOTE — PERIOP NOTES
Reviewed PTA medication list with patient/caregiver and patient/caregiver denies any additional medications. Patient admits to having a responsible adult care for them at home for at least 24 hours after surgery. Patient encouraged to use gown warming system and informed that using said warming gown to regulate body temperature prior to a procedure has been shown to help reduce the risks of blood clots and infection. Patient's pharmacy of choice verified and documented in PTA medication section. Dual skin assessment & fall risk band verification completed with Lobo Lu RN.

## 2021-04-12 NOTE — DISCHARGE SUMMARY
[address] [id number]   [facility] [address]     DISCHARGE SUMMARY     PATIENT: Xavier Pablo     MRN: [id number]   ADMIT DATE: 2021   BILLIN   DISCHARGE DATE: 2021     ATTENDING: Yasemin Reid MD   DICTATING: HADLEY Sibley         ADMISSION DIAGNOSIS: Osteoarthritis of left knee [M17.12]    DISCHARGE DIAGNOSIS: Status post REVISION LEFT TOTAL KNEE ARTHROPLASTY    HISTORY OF PRESENT ILLNESS: The patient is a 63y.o. year-old female   with ongoing left knee pain secondary to progressive osteoarthritis of her left knee following partial knee replacement. The patient's pain has persisted and progressed despite conservative treatments and therapies. The patient has at this time opted for surgical intervention.     PAST MEDICAL HISTORY:   Past Medical History:   Diagnosis Date    Adverse effect of anesthesia     small airway, difficult intubation, pt requesting anesthesia consult    Asthma     seasonal; no inhaler    Borderline diabetes     Chronic pain     lower back, arms and legs    Depression     Diabetes ([address]     diet control and exercise    Difficult intubation     GERD (gastroesophageal reflux disease)     H/O seasonal allergies     Hypertension     no meds at this time    Morbid obesity ([address]     Morbid obesity with BMI of 40.0-44.9, adult ([address]     Multiple sclerosis ([address]     Osteoarthritis of left knee 2016    Osteoarthritis of right knee     Overactive bladder     Rheumatoid arthritis ([address]     patient denies    Thyroid cancer ([address]        PAST SURGICAL HISTORY:   Past Surgical History:   Procedure Laterality Date    HX  SECTION   &     HX GASTRIC BYPASS      gastric sleeve    HX HERNIA REPAIR      umbilical x2    HX HYSTERECTOMY      RYNE    HX LAP CHOLECYSTECTOMY      [address]    HX ORTHOPAEDIC Bilateral     partial knee replacement    HX ORTHOPAEDIC Left     knee \"ligament repair\"    HX OTHER SURGICAL      fatty tumor removal left arm    HX THYROIDECTOMY      2014- [facility][1]    HX TONSILLECTOMY      SC LAP, RADHA RESTRICT PROC, LONGITUDINAL GASTRECTOMY      9/12/2017       ALLERGIES:   Allergies   Allergen Reactions    Shellfish Derived Shortness of Breath and Swelling    Adhesive Tape-Silicones Other (comments)     Skin peels and blister    Dilaudid [Hydromorphone] Other (comments)     \"uncontrollable crying\"    Sulfa (Sulfonamide Antibiotics) Itching        CURRENT MEDICATIONS:  A list of medications prior to the time of admission include:  Prior to Admission medications    Medication Sig Start Date End Date Taking? Authorizing Provider   aspirin delayed-release 81 mg tablet Take 1 Tab by mouth two (2) times a day for 21 days. 4/12/21 5/3/21 Yes Sarah Osuna Cea, PA   oxyCODONE IR (ROXICODONE) 5 mg immediate release tablet Take 1 Tab by mouth every four (4) hours as needed for Pain for up to 7 days. Max Daily Amount: 30 mg. 4/12/21 4/19/21 Yes Sarah Osuna Cea, PA   cefadroxil (DURICEF) 500 mg capsule Take 1 Cap by mouth two (2) times a day for 5 days. 4/12/21 4/17/21 Yes Cami Osuna PA   atorvastatin (Lipitor) 10 mg tablet Take  by mouth nightly. Yes Provider, Historical   pantoprazole (Protonix) 20 mg tablet Take 40 mg by mouth daily. Yes Provider, Historical   levothyroxine (SYNTHROID) 112 mcg tablet Take 100 mcg by mouth Daily (before breakfast). Indications: additional treatment for thyroid cancer   Yes Provider, Historical   acetaminophen (TYLENOL EXTRA STRENGTH) 500 mg tablet Take 650 mg by mouth every six (6) hours as needed for Pain. Yes Provider, Historical   gabapentin (NEURONTIN) 300 mg capsule Take 600 mg by mouth two (2) times a day. Yes Provider, Historical   baclofen (LIORESAL) 10 mg tablet Take 10 mg by mouth two (2) times a day.  Indications: MS   Yes Provider, Historical   cycloSPORINE (RESTASIS) 0.05 % ophthalmic emulsion Administer 1 Drop to both eyes two (2) times a day. Yes Provider, Historical   DULoxetine (CYMBALTA) 60 mg capsule Take 60 mg by mouth nightly. Indications: ANXIETY WITH DEPRESSION, NEUROPATHIC PAIN   Yes Provider, Historical   cyanocobalamin (Vitamin B-12) 500 mcg tablet Take 500 mcg by mouth daily. Provider, Historical   fluticasone propionate (FLONASE NA) by Nasal route. Provider, Historical   ocrelizumab (OCREVUS) 30 mg/mL soln injection by IntraVENous route. q6months    Provider, Historical   cyanocobalamin (VITAMIN B-12) 1,000 mcg tablet Take 500 mcg by mouth every seven (7) days. Provider, Historical   calcium-cholecalciferol, d3, (CALCIUM 600 + D) 600-125 mg-unit tab Take 600 mg by mouth daily. Provider, Historical   cholecalciferol (VITAMIN D3) 1,000 unit cap Take 1,000 Units by mouth daily. Provider, Historical   b complex vitamins (B COMPLEX 1) tablet Take 1 Tab by mouth daily. Provider, Historical   multivitamin (ONE A DAY) tablet Take 1 Tab by mouth daily. Provider, Historical   zolpidem (AMBIEN) 10 mg tablet Take 5 mg by mouth nightly as needed for Sleep. Provider, Historical   EPINEPHrine (EPIPEN) 0.3 mg/0.3 mL (1:1,000) injection 0.3 mg by IntraMUSCular route once as needed for Anaphylaxis (Iodine/Shellfish). Indications: ANAPHYLAXIS    Provider, Historical       FAMILY HISTORY: History reviewed. No pertinent family history.     SOCIAL HISTORY:   Social History     Socioeconomic History    Marital status:      Spouse name: Not on file    Number of children: Not on file    Years of education: Not on file    Highest education level: Not on file   Tobacco Use    Smoking status: Former Smoker     Quit date: 3/9/2002     Years since quittin.1    Smokeless tobacco: Never Used   Substance and Sexual Activity    Alcohol use: Yes     Comment: 2 alcohol beverages per month    Drug use: Yes     Types: Marijuana     Comment: edibles; patient advised to hold for 7 days prior to DOS.  Sexual activity: Yes       REVIEW OF SYSTEMS: All review of systems are negative. PHYSICAL EXAMINATION: For a detailed physical exam, please refer to the patient's chart. HOSPITAL COURSE: The patient was taken to surgery the day of admission. she underwent a revision left total knee replacement (conversion of uni to total). Operative course was benign. Estimated blood loss was approximately 150 cc. The patient was taken to the PACU in stable condition and was later taken to the floor in stable condition. During her hospital stay, the patient progressed well with physical therapy and occupational therapy, adherent to instructions. she had been cleared by physical therapy with stair training. she was placed on Aspirin for DVT prophylaxis. her pain has been well controlled with oral pain medications. her vitals have remained stable. she has also remained hemodynamically stable. The patient has been recommended for discharge home. DISCHARGE INSTRUCTIONS: The patient is to be discharged home with the following medication changes:     Discharge Medication List as of 4/12/2021 12:36 PM      START taking these medications    Details   aspirin delayed-release 81 mg tablet Take 1 Tab by mouth two (2) times a day for 21 days. , Normal, Disp-42 Tab, R-0      oxyCODONE IR (ROXICODONE) 5 mg immediate release tablet Take 1 Tab by mouth every four (4) hours as needed for Pain for up to 7 days. Max Daily Amount: 30 mg., Normal, Disp-42 Tab, R-0      cefadroxil (DURICEF) 500 mg capsule Take 1 Cap by mouth two (2) times a day for 5 days. , Normal, Disp-10 Cap, R-0         CONTINUE these medications which have NOT CHANGED    Details   !! cyanocobalamin (Vitamin B-12) 500 mcg tablet Take 500 mcg by mouth daily. , Historical Med      fluticasone propionate (FLONASE NA) by Nasal route., Historical Med      atorvastatin (Lipitor) 10 mg tablet Take  by mouth nightly., Historical Med pantoprazole (Protonix) 20 mg tablet Take 40 mg by mouth daily. , Historical Med      ocrelizumab (OCREVUS) 30 mg/mL soln injection by IntraVENous route. q6months, Historical Med      !! cyanocobalamin (VITAMIN B-12) 1,000 mcg tablet Take 1,000 mcg by mouth daily. , Historical Med      calcium-cholecalciferol, d3, (CALCIUM 600 + D) 600-125 mg-unit tab Take  by mouth., Historical Med      cholecalciferol (VITAMIN D3) 1,000 unit cap Take  by mouth daily. , Historical Med      b complex vitamins (B COMPLEX 1) tablet Take 1 Tab by mouth daily. , Historical Med      levothyroxine (SYNTHROID) 112 mcg tablet Take 100 mcg by mouth Daily (before breakfast). Indications: additional treatment for thyroid cancer, Historical Med      acetaminophen (TYLENOL EXTRA STRENGTH) 500 mg tablet Take 1,000 mg by mouth daily. , Historical Med      multivitamin (ONE A DAY) tablet Take 1 Tab by mouth daily. , Historical Med      gabapentin (NEURONTIN) 300 mg capsule Take 600 mg by mouth two (2) times a day., Historical Med      zolpidem (AMBIEN) 10 mg tablet Take 5 mg by mouth nightly as needed for Sleep., Historical Med      baclofen (LIORESAL) 10 mg tablet Take 10 mg by mouth two (2) times a day. Indications: MS, Historical Med      cycloSPORINE (RESTASIS) 0.05 % ophthalmic emulsion Administer 1 Drop to both eyes two (2) times a day., Historical Med      EPINEPHrine (EPIPEN) 0.3 mg/0.3 mL (1:1,000) injection 0.3 mg by IntraMUSCular route once as needed for Anaphylaxis (Iodine/Shellfish). Indications: ANAPHYLAXIS, Historical Med      DULoxetine (CYMBALTA) 60 mg capsule Take 60 mg by mouth nightly. Indications: ANXIETY WITH DEPRESSION, NEUROPATHIC PAIN, Historical Med       !! - Potential duplicate medications found. Please discuss with provider.       STOP taking these medications       Flaxseed Oil oil Comments:   Reason for Stopping:         cranberry extract 450 mg tab tablet Comments:   Reason for Stopping:         omega 3-dha-epa-fish oil (FISH OIL) 100-160-1,000 mg cap Comments:   Reason for Stopping:                   The patient is to continue at home with home physical therapy 3 times a week to work on gait training, range of motion, strengthening, and weightbearing exercises as tolerated on the left lower extremity. The patient is to progress from a walker to a cane to complete total weightbearing as tolerable. The patient is to continue to keep her incision dry. The patient is to followup with Dr. China Edwards, Do Calloway PA-C and/or Ramos Flynn PA-C in the office approximately 10-14 days status post for x-rays and further evaluation.     HADLEY Lind  4/15/2021

## 2021-04-12 NOTE — ANESTHESIA PROCEDURE NOTES
R Jimenezck Peripheral Block    Start time: 4/12/2021 7:23 AM  End time: 4/12/2021 7:25 AM  Performed by: Surya García MD  Authorized by: Surya García MD       Pre-procedure:    Indications: at surgeon's request and post-op pain management    Preanesthetic Checklist: patient identified, risks and benefits discussed, timeout performed, anesthesia consent given and patient being monitored    Timeout Time: 07:17          Block Type:   Block Type:  Ipack  Laterality:  Right  Monitoring:  Standard ASA monitoring, responsive to questions, continuous pulse ox, oxygen, frequent vital sign checks and heart rate  Injection Technique:  Single shot  Procedures: ultrasound guided    Patient Position: supine  Prep: chlorhexidine    Location:  Lower thigh  Needle Type:  Stimuplex  Needle Gauge:  20 G  Needle Localization:  Ultrasound guidance  Medication Injected:  DexmedeTOMidine (PRECEDEX) 100 mcg/mL iv solution, 10 mcg  Med Admin Time: 4/12/2021 7:25 AM    Assessment:  Number of attempts:  1  Injection Assessment:  Incremental injection every 5 mL, local visualized surrounding nerve on ultrasound, negative aspiration for blood, no intravascular symptoms, no paresthesia and ultrasound image on chart  Patient tolerance:  Patient tolerated the procedure well with no immediate complications

## 2021-04-12 NOTE — PROGRESS NOTES
Progression of Symptoms:    [x] Pain 4/10   [] Improvement post medication administration   [] No improvement, provider notified   [] Nausea   [] Improvement post medication administration   [] No improvement, provider notified   [] Hypotension/Hypertension   [] Improved post medication administration   [] No improvement, provider notified     Readiness for Discharge:  [x] Vital signs stable  [x] + Voiding  [x] Wound intact, drainage minimal  [x] Tolerating PO intake  [x] Cleared by PT (OT if applicable)  [x] Discharge education completed with patient and family member to specifically include   [x] Recommended stool softener  [x] Proper elevation visual demonstration

## 2021-04-12 NOTE — PERIOP NOTES
TRANSFER - IN REPORT:    Verbal report received from ORN & CRNA on Abhi Mancilla  being received from OR (unit) for routine post - op      Report consisted of patients Situation, Background, Assessment and   Recommendations(SBAR). Information from the following report(s) SBAR was reviewed with the receiving nurse. Opportunity for questions and clarification was provided. Assessment completed upon patients arrival to unit and care assumed.

## 2021-04-12 NOTE — ROUTINE PROCESS
TRANSFER - IN REPORT: 
 
Verbal report received from [facility] RN(name) on Tana Hwang  being received from [employer] [employer]) for routine post - op Report consisted of patients Situation, Background, Assessment and  
Recommendations(SBAR). Information from the following report(s) SBAR, Kardex, [facility] and Recent Results was reviewed with the receiving nurse. Opportunity for questions and clarification was provided. Assessment completed upon patients arrival to unit and care assumed.

## 2021-04-12 NOTE — INTERVAL H&P NOTE
Update History & Physical 
 
The Patient's History and Physical of April 11, 2021 was reviewed with the patient and I examined the patient. There was no change. The surgical site was confirmed by the patient and me. Plan:  The risk, benefits, expected outcome, and alternative to the recommended procedure have been discussed with the patient. Patient understands and wants to proceed with the procedure.  
 
Electronically signed by Nessa Coburn MD on 4/12/2021 at 7:21 AM

## 2021-04-12 NOTE — PERIOP NOTES
TRANSFER - OUT REPORT:    Verbal report given to Sindy Arceo RN on Art Kulkarni  being transferred to [address] [place] [address] (unit) for routine post - op       Report consisted of patients Situation, Background, Assessment and   Recommendations(SBAR). Information from the following report(s) SBAR was reviewed with the receiving nurse. Lines:   Peripheral IV 04/12/21 Left Hand (Active)   Site Assessment Clean, dry, & intact 04/12/21 0952   Phlebitis Assessment 0 04/12/21 0952   Infiltration Assessment 0 04/12/21 0952   Dressing Status Clean, dry, & intact 04/12/21 0952   Dressing Type Tape;Transparent 04/12/21 0952   Hub Color/Line Status Infusing 04/12/21 0952   Alcohol Cap Used No 04/12/21 0640        Opportunity for questions and clarification was provided.       Patient transported with:   Registered Nurse

## 2021-04-12 NOTE — PERIOP NOTES
Verbal hand off at the bedside with Ken Emerson RN, personal belongings brought over with the patient to room.

## 2021-04-12 NOTE — PROGRESS NOTES
Problem: Mobility Impaired (Adult and Pediatric)  Goal: *Acute Goals and Plan of Care (Insert Text)  Description: Physical Therapy short term goals initiated 04/12/21, to be achieved in 2 days. Pt will:   1. Perform bed mobility with indep in prep for OOB activity. 2. Perform sit <> stand transfers with RW and S in prep for functional mobility and ambulation. 3. Ambulate 150 ft with RW and S in prep for household and community mobility. 4. Ascend/descend 1 stair with RW and S for safe home entry. Note: [x]  Patient has met MD sofia martinez for d/c home   [x]  Recommend HH with 24 hour adult care   []  Benefit from additional acute PT session to address:      PHYSICAL THERAPY EVALUATION    Patient: Ivy Horton (1 y.o. female)  Date: 4/12/2021  Primary Diagnosis: Osteoarthritis of left knee [M17.12]  Procedure(s) (LRB):  LEFT KNEE REVISION- CONVERT UNI TO TOTAL (ORTHO DEVELOPMENT) (Left) Day of Surgery   Precautions:  Fall, WBAT  WBAT  PLOF: Pt was independent with household and community mobility with no AD PTA. ASSESSMENT :  Based on the objective data described below, the patient presents with decr L LE strength and ROM, decr activity tolerance, decr balance, and L knee pain resulting in deficits in transfers and gait quality and tolerance. Pt seen with OT for additional set of skilled hands. Pt sitting in recliner on PT entry, rated L knee pain 2/10. Pt required CGA for sit <> stand transfers with RW and vc for safe hand placement. Pt ambulated 100ft in conrad with RW and CGA demonstrating decr speed, occasional standing rest breaks d/t pain in R wrist from use of walker (pt has history of MS and reports this wrist pain is typical for her). Pt ascended/descended 1 box step with RW and CGA with vc for safe hand placement. Pt educated on importance of early mobility, use of ice to decr pain and inflammation, and home safety. Vc for safety and appropriate use of RW provided t/o session.  Pt left sitting in recliner with all needs met, all questions answered, and ice to L knee. Pt would benefit from additional skilled therapy after discharge in order to incr functional mobility and promote return to PLOF. Recommend HHPT after discharge. Patient will benefit from skilled intervention to address the above impairments. Patient's rehabilitation potential is considered to be Good  Factors which may influence rehabilitation potential include:   []         None noted  []         Mental ability/status  [x]         Medical condition  []         Home/family situation and support systems  []         Safety awareness  []         Pain tolerance/management  []         Other:      PLAN :  Recommendations and Planned Interventions:   [x]           Bed Mobility Training             []    Neuromuscular Re-Education  [x]           Transfer Training                   []    Orthotic/Prosthetic Training  [x]           Gait Training                          [x]    Modalities  [x]           Therapeutic Exercises           []    Edema Management/Control  [x]           Therapeutic Activities            [x]    Family Training/Education  [x]           Patient Education  []           Other (comment):    Frequency/Duration: Patient will be followed by physical therapy twice daily to address goals. Discharge Recommendations: Home Health  Further Equipment Recommendations for Discharge: N/A     SUBJECTIVE:   Patient stated I'm doing okay, just really sleepy.     OBJECTIVE DATA SUMMARY:     Past Medical History:   Diagnosis Date    Adverse effect of anesthesia     small airway, difficult intubation, pt requesting anesthesia consult    Asthma     seasonal; no inhaler    Borderline diabetes     Chronic pain     lower back, arms and legs    Depression     Diabetes (HCC)     diet control and exercise    Difficult intubation     GERD (gastroesophageal reflux disease)     H/O seasonal allergies     Hypertension     no meds at this time Morbid obesity ([facility]     Morbid obesity with BMI of 40.0-44.9, adult ([facility]     Multiple sclerosis ([facility] 2008    Osteoarthritis of left knee 03/24/2016    Osteoarthritis of right knee     Overactive bladder     Rheumatoid arthritis ([facility] [address] [facility]     patient denies    Thyroid cancer [facility])      Past Surgical History:   Procedure Laterality Date     [address]    HX GASTRIC BYPASS      gastric sleeve    HX HERNIA REPAIR      umbilical x2    HX HYSTERECTOMY  2004    [facility]NE    HX LAP CHOLECYSTECTOMY      2004- [address]    HX ORTHOPAEDIC Bilateral     partial knee replacement    HX ORTHOPAEDIC Left     knee \"ligament repair\"    HX OTHER SURGICAL      fatty tumor removal left arm    HX THYROIDECTOMY      2014- [facility]    HX TONSILLECTOMY      NH LAP, RADHA RESTRICT PROC, LONGITUDINAL GASTRECTOMY      9/12/2017     Barriers to Learning/Limitations: yes;  other post-anesthesia  Compensate with: Visual Cues, Verbal Cues, and Tactile Cues  Home Situation:  Home Situation  Home Environment: Private residence  # Steps to Enter: 1  One/Two Story Residence: Two story  Interior Rails: Right  Lift Chair Available: Yes  Living Alone: No  Support Systems: Spouse/Significant Other/Partner, Family member(s)  Patient Expects to be Discharged to[de-identified] Private residence  Current DME Used/Available at Home: Walker, rolling, [address] [facility] chair, Raised toilet seat  Tub or Shower Type: Shower  Critical Behavior:  Neurologic State: Alert  Orientation Level: Oriented X4  Cognition: Appropriate for age attention/concentration; Follows commands  Safety/Judgement: Fall prevention;Home safety  Psychosocial  Patient Behaviors: Calm; Cooperative  Family  Behaviors: Supportive  Purposeful Interaction: Yes  Pt Identified Daily Priority: Clinical issues (comment)  Caritas Process: Nurture loving kindness;Establish trust;Teaching/learning; Attend basic human needs;Create healing environment  Caring Interventions: Reassure; Therapeutic modalities  Reassure: Therapeutic listening; Informing  Therapeutic Modalities: Humor  Skin Condition/Temp: Warm;Dry  Family  Behaviors: Supportive  Skin Integrity: Incision (comment)(L knee)  Skin Integumentary  Skin Color: Appropriate for ethnicity  Skin Condition/Temp: Warm;Dry  Skin Integrity: Incision (comment)(L knee)  Strength:    Strength: Generally decreased, functional  Tone & Sensation:   Tone: Normal  Sensation: Impaired(L knee)  Range Of Motion:  AROM: Generally decreased, functional  Functional Mobility:  Bed Mobility:  Scooting: Stand-by assistance  Transfers:  Sit to Stand: Contact guard assistance(vc)  Stand to Sit: Contact guard assistance(vc)  Balance:   Sitting: Intact  Standing: Intact; With support  Ambulation/Gait Training:  Distance (ft): 100 Feet (ft)  Assistive Device: Gait belt;Walker, rolling  Ambulation - Level of Assistance: Contact guard assistance  Gait Abnormalities: Decreased step clearance;Trunk sway increased  Stance: Left decreased  Speed/Dorene: Slow  Step Length: Left shortened;Right shortened  Interventions: Tactile cues; Safety awareness training;Verbal cues  Stairs:  Number of Stairs Trained: 1(box step)  Stairs - Level of Assistance: Contact guard assistance(vc)  Rail Use: (RW)  Pain:  Pain level pre-treatment: 2/10   Pain level post-treatment: 2/10   Pain Intervention(s) : Medication (see MAR); Rest, Ice, Repositioning  Response to intervention: Nurse notified, See doc flow    Activity Tolerance:   Pt tolerated amb in conrad with RW and CGA with no LOB or path deviation, rated L knee pain 2/10 t/o session. Please refer to the flowsheet for vital signs taken during this treatment.   After treatment:   [x]         Patient left in no apparent distress sitting up in chair  []         Patient left in no apparent distress in bed  [x]         Call bell left within reach  [x]         Nursing notified  []         Caregiver present  []         Bed alarm activated  []         SCDs applied    COMMUNICATION/EDUCATION:   [x]         Role of Physical Therapy in the acute care setting. [x]         Fall prevention education was provided and the patient/caregiver indicated understanding. [x]         Patient/family have participated as able in goal setting and plan of care. [x]         Patient/family agree to work toward stated goals and plan of care. []         Patient understands intent and goals of therapy, but is neutral about his/her participation. []         Patient is unable to participate in goal setting/plan of care: ongoing with therapy staff.  []         Other:     Thank you for this referral.  Ivy Collier   Time Calculation: 18 mins      Eval Complexity: History: LOW Complexity : Zero comorbidities / personal factors that will impact the outcome / POCExam:LOW Complexity : 1-2 Standardized tests and measures addressing body structure, function, activity limitation and / or participation in recreation  Presentation: LOW Complexity : Stable, uncomplicated  Clinical Decision Making:Low Complexity    Overall Complexity:LOW

## 2021-04-12 NOTE — ANESTHESIA PROCEDURE NOTES
R Adductor Canal Peripheral Block    Start time: 4/12/2021 7:18 AM  End time: 4/12/2021 7:23 AM  Performed by: Levi El MD  Authorized by: Levi El MD       Pre-procedure: Indications: at surgeon's request and post-op pain management    Preanesthetic Checklist: patient identified, risks and benefits discussed, timeout performed, anesthesia consent given and patient being monitored    Timeout Time: 07:17          Block Type:   Block Type:   Adductor canal  Laterality:  Right  Monitoring:  Standard ASA monitoring, responsive to questions, continuous pulse ox, oxygen, frequent vital sign checks and heart rate  Injection Technique:  Single shot  Procedures: ultrasound guided    Patient Position: supine  Prep: chlorhexidine    Location:  Mid thigh  Needle Type:  Stimuplex  Needle Gauge:  20 G  Needle Localization:  Ultrasound guidance  Medication Injected:  Ropivacaine (PF) (NAROPIN) 5 mg/mL (0.5 %) injection, 20 mL  dexmedeTOMidine (PRECEDEX) 100 mcg/mL iv solution, 10 mcg  Med Admin Time: 4/12/2021 7:23 AM    Assessment:  Number of attempts:  1  Injection Assessment:  Incremental injection every 5 mL, local visualized surrounding nerve on ultrasound, negative aspiration for blood, no intravascular symptoms, no paresthesia and ultrasound image on chart  Patient tolerance:  Patient tolerated the procedure well with no immediate complications

## 2021-04-12 NOTE — OP NOTES
[address] [facility] [address]  Total Knee Arthroplasty    Patient: Christina Harrington MRN: [id number]  SSN: [id number]    YOB: 1960  Age: 63 y.o. Sex: female      Date of Surgery: 4/12/2021   Preoperative Diagnosis: FAILED JOINT REPLACEMENT/LEFT KNEE   Postoperative Diagnosis: FAILED JOINT REPLACEMENT/LEFT KNEE   Location: [facility]  Surgeon: Kermit Shannon MD  Assistant: Kevin Erickson PA-C    Anesthesia: General and adductor canal Nerve Block    Procedure: revision leftTotal Knee Arthroplasty:   The complexity of the total joint surgery requires the use of a first assistant for positioning, retraction and assistance in closure. Tourniquet Time: Tourniquet not used. Estimated Blood Loss: Less than 100cc     Implants:   Implant Name Type Inv. Item Serial No.  Lot No. LRB No. Used Action   CEMENT BNE 40GM FULL DOSE PMMA W/O ANTIBIO HI VISC N RADPQ - [id number]  CEMENT BNE 40GM FULL DOSE PMMA W/O ANTIBIO HI VISC N RADPQ  [id number] [id number] Left 2 Implanted   INSERT TIB SZ 4 [id number] UCONG BKS E VITALIZE - [id number]  INSERT TIB SZ 4 [id number] UCONG BKS E VITALIZE  ORTHO DEVELOPMENT Signal Data [id number] Left 1 Implanted   COMPONENT PATELLAR 29 MM KNEE E-VITALIZE BKS TRIMAX - [id number]  COMPONENT PATELLAR 29 MM KNEE E-VITALIZE BKS TRIMAX  ORTHO DEVELOPMENT Signal Data [id number] Left 1 Implanted   COMPONENT FEM SZ 4 L KNEE NP CRUCE RET NACHO BKS TRIMAX - [id number]  COMPONENT FEM SZ 4 L KNEE NP CRUCE RET NACHO BKS TRIMAX  ORTHO DEVELOPMENT Signal Data [id number] Left 1 Implanted   TRAY TIB SZ 4 NP A BAL KNEE - [id number]  TRAY TIB SZ 4 NP A BAL KNEE  ORTHO DEVELOPMENT [employer]WD [id number] Left 1 Implanted        Specimens: None    Additional Findings: None     Complications: none    Body Mass Index: Body mass index is 34.2 kg/m².     Procedure Detail:  Prior to the surgery the patient was administered a femoral nerve block in the preoperative holding area by the anesthesiologistCaridad Hwang was brought to the operating room and positioned on the operating table. She was anesthetized with anesthesia. Intravenous antibiotics were administered. Prior to the incision being made a timeout was called identifying the patient, procedure, operative side, and surgeon. A pneumatic tourniquet was placed about the limb and the left leg was prepped and draped in the usual sterile manner. The tourniquet was not inflated throughout the case. A midline anterior incision made over the knee. The incision was carried down through the subcutaneous tissue to the underlying capsule. A medial parapatellar capsular incision was performed. The medial capsular flap was carefully elevated around to the posterior medial corner protecting the medial collateral ligaments and the fibers. The patella was sized with a caliper, and approximately 10-12 mm was resected with an oscillating saw allowing the patella to be slid into the lateral gutter. It was not everted throughout the case. The uni components were then removed with an osteotome with very little bone loss. Any remaining cement was also removed with an osteotome and rongeur. Our attention was first turned to the distal femur and using intermedullary instrumentation, a 5-degree valgus cut was on the distal end of the femur. The distal end of the femur was sized to a size 4N femoral component. Pins were inserted through the sizer and the corresponding 4-in-1 block was slid into place and pinned for stability. Anterior and posterior and chamfer cuts were made to accommodate the femoral component. The medial and lateral menisci were excised as were the anterior cruciate ligaments. Our attention was then turned to the tibia. Using extramedullary instrumentation, a 3-degree cut was made on the proximal end of the tibia.  A spacer block was placed to show gaps had been balanced and a size 4  tibial base plate was placed on the tibia and pinned into place. Intramedullary reaming guide was placed on the tibia and the appropriate reamer was used followed by the keel punch to complete the preparation of the tibia. A trial femoral component was then impacted on to the distal end of the femur. Trial reduction was then performed with incremental size trial bearing surfaces. The orthodevelopment BKS trimax UC 10mm bearing surface was inserted and allowed for full extension, good medial, lateral stability at 90 degrees of flexion, especially medially. Our attention was then turned to the patella. The patella was sized to a 29mm patella. The guide was pinned, placed over patella, 3 holes were drilled. Trial patella button was inserted and the patella was reduced in the knee. The patella tracked normally using no-touch technique. The trial components were then all removed. The real components were opened on the back. The cut surfaces of the bone were prepared using the PulsaVac lavage. Prior to this, the Aquamantys was used to cauterize any soft tissue bleeding. 40 grams of Gram Gamesuy HV cement was mixed. The femoral and tibial components  and patellar component was cemented into place. Excess cement was removed from around the edge of bone using plastic curette. Once the cement had hardened, the knee was placed through range of motion and noted to be stable as mentioned above with the trail components. The wound was dry, therefore no drain was used. The operative knee was injected with 20 cc of exparel. The knee was then soaked with a diluted betadine solution for approximately 3 min. This was then thoroughly irrigated. The capsular layer was closed using a #2 stratafix suture with the knee flexed 90 degrees, while subcutaneous layers were closed using 2-0 Vicryl suture. Finally the skin was closed using Prineo, which were applied in occlusive fashion and sterile bandage applied. All sponge and needle counts were correct.   She was taken to the recovery room extubated in stable condition.     Signed By: Juliana Rocha MD     April 12, 2021

## 2021-04-12 NOTE — PROGRESS NOTES
Same Day Discharge    1100 - Patient arrives to unit at this time. Dual skin assessment completed with Vanessa Iniguez RN, no abnormalities noted other than surgical incision to L knee. mepilex dressing CDI. Patient oriented to room to include use of call bell, meal ordering, and use of incentive spirometer, patient able to get IS to 2000. Patient instructed to order lunch and given explanation of home for dinner plan. Phone and call bell left within reach. Educated on pain medication availability and possible side effects, as well as need to call for assistance before getting out of bed. Patient verbalized understanding.      Symptoms present on arrival:  [x] Pain 6/10   [] Medicated  [] Nausea   [] Medicated   [] Hypotension/Hypertension   [] Provider notified

## 2021-04-12 NOTE — ANESTHESIA POSTPROCEDURE EVALUATION
Procedure(s):  LEFT KNEE REVISION- CONVERT UNI TO TOTAL (ORTHO DEVELOPMENT). general    Anesthesia Post Evaluation      Multimodal analgesia: multimodal analgesia used between 6 hours prior to anesthesia start to PACU discharge  Patient location during evaluation: PACU  Level of consciousness: awake  Pain score: 2  Airway patency: patent  Anesthetic complications: no  Cardiovascular status: acceptable  Respiratory status: acceptable  Hydration status: acceptable  Post anesthesia nausea and vomiting:  none  Final Post Anesthesia Temperature Assessment:  Normothermia (36.0-37.5 degrees C)      INITIAL Post-op Vital signs:   Vitals Value Taken Time   /61 04/12/21 1035   Temp 36.7 °C (98 °F) 04/12/21 1014   Pulse 82 04/12/21 1041   Resp 12 04/12/21 1041   SpO2 92 % 04/12/21 1041   Vitals shown include unvalidated device data.

## 2021-04-13 ENCOUNTER — HOME CARE VISIT (OUTPATIENT)
Dept: SCHEDULING | Facility: HOME HEALTH | Age: 61
End: 2021-04-13
Payer: MEDICARE

## 2021-04-13 ENCOUNTER — HOME CARE VISIT (OUTPATIENT)
Dept: HOME HEALTH SERVICES | Facility: HOME HEALTH | Age: 61
End: 2021-04-13

## 2021-04-13 VITALS
HEART RATE: 86 BPM | RESPIRATION RATE: 16 BRPM | SYSTOLIC BLOOD PRESSURE: 140 MMHG | OXYGEN SATURATION: 97 % | TEMPERATURE: 97.2 F | DIASTOLIC BLOOD PRESSURE: 60 MMHG

## 2021-04-13 PROCEDURE — 400013 HH SOC

## 2021-04-13 PROCEDURE — G0151 HHCP-SERV OF PT,EA 15 MIN: HCPCS

## 2021-04-13 PROCEDURE — 3331090001 HH PPS REVENUE CREDIT

## 2021-04-13 PROCEDURE — 400018 HH-NO PAY CLAIM PROCEDURE

## 2021-04-13 PROCEDURE — A6213 FOAM DRG >16<=48 SQ IN W/BDR: HCPCS

## 2021-04-13 PROCEDURE — 3331090002 HH PPS REVENUE DEBIT

## 2021-04-14 ENCOUNTER — HOME CARE VISIT (OUTPATIENT)
Dept: SCHEDULING | Facility: HOME HEALTH | Age: 61
End: 2021-04-14
Payer: MEDICARE

## 2021-04-14 ENCOUNTER — HOME CARE VISIT (OUTPATIENT)
Dept: HOME HEALTH SERVICES | Facility: HOME HEALTH | Age: 61
End: 2021-04-14
Payer: MEDICARE

## 2021-04-14 ENCOUNTER — TELEPHONE (OUTPATIENT)
Dept: OTHER | Age: 61
End: 2021-04-14

## 2021-04-14 VITALS
RESPIRATION RATE: 16 BRPM | DIASTOLIC BLOOD PRESSURE: 80 MMHG | HEART RATE: 114 BPM | OXYGEN SATURATION: 97 % | SYSTOLIC BLOOD PRESSURE: 120 MMHG

## 2021-04-14 PROCEDURE — 3331090001 HH PPS REVENUE CREDIT

## 2021-04-14 PROCEDURE — G0157 HHC PT ASSISTANT EA 15: HCPCS

## 2021-04-14 PROCEDURE — 3331090002 HH PPS REVENUE DEBIT

## 2021-04-14 PROCEDURE — G0299 HHS/HOSPICE OF RN EA 15 MIN: HCPCS

## 2021-04-15 ENCOUNTER — TELEPHONE (OUTPATIENT)
Dept: OTHER | Age: 61
End: 2021-04-15

## 2021-04-15 ENCOUNTER — HOME CARE VISIT (OUTPATIENT)
Dept: SCHEDULING | Facility: HOME HEALTH | Age: 61
End: 2021-04-15
Payer: MEDICARE

## 2021-04-15 VITALS
OXYGEN SATURATION: 99 % | TEMPERATURE: 97.7 F | HEART RATE: 101 BPM | DIASTOLIC BLOOD PRESSURE: 70 MMHG | SYSTOLIC BLOOD PRESSURE: 152 MMHG

## 2021-04-15 VITALS
OXYGEN SATURATION: 95 % | SYSTOLIC BLOOD PRESSURE: 160 MMHG | TEMPERATURE: 98.3 F | HEART RATE: 88 BPM | DIASTOLIC BLOOD PRESSURE: 80 MMHG

## 2021-04-15 PROCEDURE — 3331090002 HH PPS REVENUE DEBIT

## 2021-04-15 PROCEDURE — 3331090001 HH PPS REVENUE CREDIT

## 2021-04-15 PROCEDURE — G0157 HHC PT ASSISTANT EA 15: HCPCS

## 2021-04-16 PROCEDURE — 3331090002 HH PPS REVENUE DEBIT

## 2021-04-16 PROCEDURE — 3331090001 HH PPS REVENUE CREDIT

## 2021-04-17 PROCEDURE — 3331090002 HH PPS REVENUE DEBIT

## 2021-04-17 PROCEDURE — 3331090001 HH PPS REVENUE CREDIT

## 2021-04-18 PROCEDURE — 3331090001 HH PPS REVENUE CREDIT

## 2021-04-18 PROCEDURE — 3331090002 HH PPS REVENUE DEBIT

## 2021-04-19 ENCOUNTER — HOME CARE VISIT (OUTPATIENT)
Dept: SCHEDULING | Facility: HOME HEALTH | Age: 61
End: 2021-04-19
Payer: MEDICARE

## 2021-04-19 VITALS
DIASTOLIC BLOOD PRESSURE: 78 MMHG | HEART RATE: 103 BPM | SYSTOLIC BLOOD PRESSURE: 122 MMHG | RESPIRATION RATE: 16 BRPM | OXYGEN SATURATION: 99 %

## 2021-04-19 PROCEDURE — 3331090002 HH PPS REVENUE DEBIT

## 2021-04-19 PROCEDURE — G0299 HHS/HOSPICE OF RN EA 15 MIN: HCPCS

## 2021-04-19 PROCEDURE — 3331090001 HH PPS REVENUE CREDIT

## 2021-04-20 ENCOUNTER — HOME CARE VISIT (OUTPATIENT)
Dept: SCHEDULING | Facility: HOME HEALTH | Age: 61
End: 2021-04-20
Payer: MEDICARE

## 2021-04-20 PROCEDURE — G0157 HHC PT ASSISTANT EA 15: HCPCS

## 2021-04-20 PROCEDURE — 3331090001 HH PPS REVENUE CREDIT

## 2021-04-20 PROCEDURE — 3331090002 HH PPS REVENUE DEBIT

## 2021-04-21 ENCOUNTER — HOME CARE VISIT (OUTPATIENT)
Dept: SCHEDULING | Facility: HOME HEALTH | Age: 61
End: 2021-04-21
Payer: MEDICARE

## 2021-04-21 PROCEDURE — G0157 HHC PT ASSISTANT EA 15: HCPCS

## 2021-04-21 PROCEDURE — 3331090001 HH PPS REVENUE CREDIT

## 2021-04-21 PROCEDURE — 3331090002 HH PPS REVENUE DEBIT

## 2021-04-22 PROCEDURE — 3331090001 HH PPS REVENUE CREDIT

## 2021-04-22 PROCEDURE — 3331090002 HH PPS REVENUE DEBIT

## 2021-04-23 ENCOUNTER — HOME CARE VISIT (OUTPATIENT)
Dept: SCHEDULING | Facility: HOME HEALTH | Age: 61
End: 2021-04-23
Payer: MEDICARE

## 2021-04-23 PROCEDURE — 3331090001 HH PPS REVENUE CREDIT

## 2021-04-23 PROCEDURE — 3331090002 HH PPS REVENUE DEBIT

## 2021-04-23 PROCEDURE — G0162 HHC RN E&M PLAN SVS, 15 MIN: HCPCS

## 2021-04-23 PROCEDURE — G0157 HHC PT ASSISTANT EA 15: HCPCS

## 2021-04-24 PROCEDURE — 3331090002 HH PPS REVENUE DEBIT

## 2021-04-24 PROCEDURE — 3331090001 HH PPS REVENUE CREDIT

## 2021-04-25 PROCEDURE — 3331090001 HH PPS REVENUE CREDIT

## 2021-04-25 PROCEDURE — 3331090002 HH PPS REVENUE DEBIT

## 2021-04-26 ENCOUNTER — HOME CARE VISIT (OUTPATIENT)
Dept: SCHEDULING | Facility: HOME HEALTH | Age: 61
End: 2021-04-26
Payer: MEDICARE

## 2021-04-26 VITALS
OXYGEN SATURATION: 99 % | HEART RATE: 98 BPM | TEMPERATURE: 96.5 F | DIASTOLIC BLOOD PRESSURE: 84 MMHG | SYSTOLIC BLOOD PRESSURE: 150 MMHG | RESPIRATION RATE: 16 BRPM

## 2021-04-26 VITALS
OXYGEN SATURATION: 97 % | TEMPERATURE: 97.3 F | HEART RATE: 80 BPM | DIASTOLIC BLOOD PRESSURE: 80 MMHG | SYSTOLIC BLOOD PRESSURE: 150 MMHG

## 2021-04-26 PROCEDURE — 3331090001 HH PPS REVENUE CREDIT

## 2021-04-26 PROCEDURE — G0151 HHCP-SERV OF PT,EA 15 MIN: HCPCS

## 2021-04-26 PROCEDURE — 3331090002 HH PPS REVENUE DEBIT

## 2021-04-27 VITALS
HEART RATE: 88 BPM | TEMPERATURE: 98.1 F | SYSTOLIC BLOOD PRESSURE: 135 MMHG | DIASTOLIC BLOOD PRESSURE: 80 MMHG | OXYGEN SATURATION: 97 %

## 2021-04-30 ENCOUNTER — HOSPITAL ENCOUNTER (OUTPATIENT)
Dept: PHYSICAL THERAPY | Age: 61
Discharge: HOME | End: 2021-04-30
Payer: MEDICARE

## 2021-04-30 PROCEDURE — 97110 THERAPEUTIC EXERCISES: CPT

## 2021-04-30 PROCEDURE — 97161 PT EVAL LOW COMPLEX 20 MIN: CPT

## 2021-04-30 PROCEDURE — 97016 VASOPNEUMATIC DEVICE THERAPY: CPT

## 2021-04-30 NOTE — PROGRESS NOTES
[facility] at [address] [place] [address] Drive: [phone]   Fax: [phone]  PLAN OF CARE / 5 [facility]  Patient Name: Donna Renee : 1960   Medical   Diagnosis: Left knee pain [M25.562] Treatment Diagnosis: Left knee pain   Onset Date: 2021     Referral Source: Bebeto Sage MD Start of Care [facility]): 2021   Prior Hospitalization: See medical history Provider #: [id number]   Prior Level of Function: Walk two miles per day, bicycling   Comorbidities: MS, OA, depression, hypothyroid, GERD, right partial TKA   Medications: Verified on Patient Summary List   The Plan of Care and following information is based on the information from the initial evaluation.   ===========================================================================================  Assessment / key information:  Donna Renee is a 63 y.o.  female who presents s/p left TKA performed 2021 with deficits in left knee AROM and strength with altered gait pattern, limited functional mobility, moderate to severe pain and moderate edema.     Patient will continue to benefit from skilled PT services to modify and progress therapeutic interventions, address functional mobility deficits, address ROM deficits, address strength deficits, analyze and address soft tissue restrictions, analyze and cue movement patterns, analyze and modify body mechanics/ergonomics and assess and modify postural abnormalities to attain remaining goals.     Pt instructed in HEP and will f/u in clinic for PT.  ===========================================================================================  Eval Complexity: History MEDIUM  Complexity : 1-2 comorbidities / personal factors will impact the outcome/ POC ;  Examination  LOW Complexity : 1-2 Standardized tests and measures addressing body structure, function, activity limitation and / or participation in recreation ; Presentation LOW Complexity : Stable, uncomplicated ;  Decision Making MEDIUM Complexity : FOTO score of 26-74; : FOTO score = an established functional score where 100 = no disability  Overall Complexity LOW   Problem List: pain affecting function, decrease ROM, decrease strength, edema affecting function, impaired gait/ balance, decrease ADL/ functional abilitiies, decrease activity tolerance, decrease flexibility/ joint mobility and decrease transfer abilities   Treatment Plan may include any combination of the following: Therapeutic exercise, Therapeutic activities, Neuromuscular re-education, Physical agent/modality, Gait/balance training, Manual therapy, Aquatic therapy, Patient education, Self Care training, Functional mobility training and Stair training  Patient / Family readiness to learn indicated by: asking questions, trying to perform skills and interest  Persons(s) to be included in education: patient (P)  Barriers to Learning/Limitations: no  Measures Taken: NA  Patient Goal (s): \"I want to walk normal distances without pain and without a device. \"   Patient self reported health status: fair  Rehabilitation Potential: good  Goals:  Short Term Goals: To be accomplished in 4 weeks:   Patient will report compliance with HEP at least 1x/day to aid in rehabilitation program.   Status at IE: Patient instructed in and provided written copy of initial Home Exercise Program.   Current: Same as IE     Patient will demonstrate left knee AROM of 0 to 120 degrees to aid in completion of ADLs. Status at IE: extension +12 degrees from neutral, flexion 92 degrees. Current: Same as IE       Long Term Goals: To be accomplished in 8 weeks:   Patient will increase strength to 5/5 throughout B LEs to aid in completion of ADLs. Status at IE: left knee flex 3+, ext 3+   Current: Same as IE     Patient will report pain less than 1-2/10 average to aid in completion of ADLs.    Status at IE: 2-9/10   Current: Same as IE     Patient will ambulate 1000 ft without device with normal gait pattern to maximize independence and safety in mobility     Status at IE: household distances with rw with antalgic gait pattern. Current: Same as IE     Patient will improve FOTO (an established functional score where 100 = no disability) to 46 points overall to demonstrate improvement in functional ability. Status at IE:25   Current: Same as IE         Frequency / Duration:   Patient to be seen 2  times per week for 8  weeks:  Patient / Caregiver education and instruction: self care and exercises      . Therapist Signature: Marco Negron DPT Date: 7/16/9389   Certification Period: 4/30/2021 to 7/27/2021 Time: 10:32 AM   ===========================================================================================  I certify that the above Physical Therapy Services are being furnished while the patient is under my care. I agree with the treatment plan and certify that this therapy is necessary. Physician Signature:        Date:       Time:        Michelle Wilburn MD    Please sign and return to [facility] or you may fax the signed copy to [phone]. Thank you.

## 2021-04-30 NOTE — PROGRESS NOTES
PT DAILY TREATMENT NOTE/KNEE EVAL 10-18    Patient Name: Macey Morejon  Date:2021  : 1960  [x]  Patient  Verified  Payor: [place] MEDICARE / Plan: [place] MEDICARE PART A & B / Product Type: Medicare /    In time:934  Out time:1022  Total Treatment Time (min): 25  Visit #: 1 of 16    Medicare/BCBS Only   Total Timed Codes (min):  25 1:1 Treatment Time:  25     Treatment Area: Left knee pain [M25.562]    SUBJECTIVE  Pain Level (0-10 scale): 2 (range 2-9)  []constant []intermittent [x]improving []worsening []no change since onset    Any medication changes, allergies to medications, adverse drug reactions, diagnosis change, or new procedure performed?: [x] No    [] Yes (see summary sheet for update)  Subjective functional status/changes:     Patient has c/c of left knee pain since left TKA performed2021. Patient describes pain as sharp medial left knee. Pain is worse in PM. Denies numbness/tingling. Denies popping/clicking. Aggravating factors: weight bearing activities. Alleviating factors: ice, elevation. Denies red flags: SOB, chest pain, dizziness/lightheadedness, blurred/double vision, HA, chills/fevers, night sweats, change in bowel/bladder control, abdominal pain, difficulty swallowing, slurred speech, unexplained weight gain/loss, nausea, vomiting. PMHx: MS, OA, depression, hypothyroid, GERD. Surgical Hx: right and left partial TKA. Social Hx:  home, social alcohol, no tobacco, retired . PLOF: walk two miles per day, bicycling. CLOF: ambulatory household distances with rw. Diagnostic Imaging: post op good alignment.     OBJECTIVE/EXAMINATION    Modality rationale: decrease edema and decrease pain to improve the patients ability to complete ADLs   Type Additional Details   [x]  Vasopneumatic Device   10 min Pressure:       [x] lo [] med [] hi   Temperature: [x] lo [] med [] hi   [x] Skin assessment post-treatment:  [x]intact []redness- no adverse reaction    []redness  adverse reaction:     23 min [x]Eval                  []Re-Eval       25 min Therapeutic Exercise:  [x] See flow sheet :   Rationale: increase ROM, increase strength and decrease pain to improve the patients ability to complete ADLs        With   [] TE   [] TA   [] neuro   [] other: Patient Education: [x] Review HEP    [] Progressed/Changed HEP based on:   [] positioning   [] body mechanics   [] transfers   [] heat/ice application    [] other:        Physical Therapy Evaluation - Knee    Posture: [] Varus    [] Valgus    [] Recurvatum        [] Tibial Torsion    [] Foot Supination    [] Foot Pronation    Describe: mild tibial torsion in ER on left. Gait:  [] Normal    [] Abnormal    [x] Antalgic    [] NWB    Device: rolling walker    Describe: decreased stance time left LE with decreased knee extension in stance.     ROM / Strength  [] Unable to assess                  AROM              Strength (1-5)    Left Right Left Right   Knee Flexion +92 +118 3+ 5    Extension +12 0 3+ 5         Palpation:   Neg/Pos  Neg/Pos  Neg/Pos   Joint Line   Pos Quad tendon Pos Patellar ligament    Patella  Fibular head  Pes Anserinus    Tibial tubercle  Hamstring tendons  Infrapatellar fat pad      Optional Tests:  Patellar Mobility   []L []R Hypermobile [x]L []R Hypomobile         Girth Measurements:     Cm         Mid-patella   Left  48.5   Right   46.7     Lachmans  [x] Neg    [] Pos Posterior Drawer [x] Neg    [] Pos  Pivot Shift  [] Neg    [] Pos Posterior Sag  [] Neg    [] Pos  NISHANT   [] Neg    [] Pos   ALRI   [] Neg    [] Pos Squat   [] Neg    [] Pos  Valgus@ 0 Degrees [x] Neg    [] Pos Helene-Jose Angel [] Neg    [] Pos  Valgus@ 30 Degrees [x] Neg    [] Pos Patellar Apprehension [] Neg    [] Pos  Varus@ 0 Degrees [x] Neg    [] Pos Vilchis's Compression [] Neg    [] Pos  Varus@ 30 Degrees [x] Neg    [] Pos Ely's Test  [] Neg    [] Pos  Apley's Compression [] Neg    [] Pos Faye's Test  [x] Neg    [] Pos  Apley's Distraction [x] Neg [] Pos Stroke Test  [] Neg    [] Pos   Anterior Drawer [] Neg    [] Pos Fluctuation Test [] Neg    [] Pos  Thessaly's Test:                  [] Neg    [] Pos     Lever Sign:              Other tests/comments:       Pain Level (0-10 scale) post treatment: 2    ASSESSMENT/Changes in Function: Patient presents s/p left TKA performed 4/12/2021 with deficits in left knee AROM and strength with altered gait pattern, limited functional mobility, moderate to severe pain and moderate edema. Patient will continue to benefit from skilled PT services to modify and progress therapeutic interventions, address functional mobility deficits, address ROM deficits, address strength deficits, analyze and address soft tissue restrictions, analyze and cue movement patterns, analyze and modify body mechanics/ergonomics and assess and modify postural abnormalities to attain remaining goals.      [x]  See Plan of Care  []  See progress note/recertification  []  See Discharge Summary         Progress towards goals / Updated goals:  See POC    PLAN  []  Upgrade activities as tolerated     [x]  Continue plan of care  []  Update interventions per flow sheet       []  Discharge due to:_  []  Other:_      Dorothy Lam, PT 4/30/2021  9:42 AM

## 2021-05-05 ENCOUNTER — HOSPITAL ENCOUNTER (OUTPATIENT)
Dept: PHYSICAL THERAPY | Age: 61
Discharge: HOME | End: 2021-05-05
Payer: MEDICARE

## 2021-05-05 PROCEDURE — 97110 THERAPEUTIC EXERCISES: CPT

## 2021-05-05 PROCEDURE — 97530 THERAPEUTIC ACTIVITIES: CPT

## 2021-05-05 PROCEDURE — 97016 VASOPNEUMATIC DEVICE THERAPY: CPT

## 2021-05-05 NOTE — PROGRESS NOTES
PT DAILY TREATMENT NOTE    Patient Name: David Xie  Date:2021  : 1960  [x]  Patient  Verified  Payor: [place] MEDICARE / Plan: [place] MEDICARE PART A & B / Product Type: Medicare /    In time: 4509  Out time: 2303  Total Treatment Time (min): 53  Total Timed Codes (min): 43  1:1 Treatment Time (MC only): 37   Visit #: 2 of 16    Treatment Area: Left knee pain [M25.562]    SUBJECTIVE  Pain Level (0-10 scale): 4  Any medication changes, allergies to medications, adverse drug reactions, diagnosis change, or new procedure performed?: [x] No    [] Yes (see summary sheet for update)  Subjective functional status/changes:   [] No changes reported  \"I am doing all the exercises twice a day and doing some of the stretches even more often than that. \"    OBJECTIVE  Modalities Rationale:     decrease edema and decrease pain to improve patient's ability to Complete ADLS  10 min []  Vasopneumatic Device, press/temp: Medium, low    min []  Other:    [x] Skin assessment post-treatment (if applicable):    [x]  intact    []  redness- no adverse reaction     []redness  adverse reaction:        30 min Therapeutic Exercise:  [x] See flow sheet :   Rationale: increase ROM, increase strength and decrease pain to improve the patients ability to complete ADLs  ambulation safety and efficiency in order to improve patient's ability to safely ambulate at home for self care.         13 min Therapeutic Activity:  [x]  See flow sheet :   Rationale: increase ROM, increase strength and improve coordination  to improve the patients ability to Complete ADLS     With   [] TE   [] TA   [] neuro   [] other: Patient Education: [x] Review HEP    [] Progressed/Changed HEP based on:   [] positioning   [] body mechanics   [] transfers   [] heat/ice application    [] other:      Other Objective/Functional Measures: NA     Pain Level (0-10 scale) post treatment: 2    ASSESSMENT/Changes in Function: Patient has moderate difficulty with reports of increased pain with gentle exercises. Educated patient further on pain modification and exercise performance to improve tolerance. Patient responds well to treatment session. No adverse effects were noted from today's treatment session. Patient will continue to benefit from skilled PT services to modify and progress therapeutic interventions, address functional mobility deficits, address ROM deficits, address strength deficits, analyze and address soft tissue restrictions, analyze and cue movement patterns, analyze and modify body mechanics/ergonomics, assess and modify postural abnormalities,  and instruct in home and community integration to attain remaining goals. []  See Plan of Care  []  See progress note/recertification  []  See Discharge Summary         Progress towards goals / Updated goals:  Short Term Goals: To be accomplished in 4 weeks:                   Patient will report compliance with HEP at least 1x/day to aid in rehabilitation program.                   Status at IE: Patient instructed in and provided written copy of initial Home Exercise Program.                   Current: Same as IE                      Patient will demonstrate left knee AROM of 0 to 120 degrees to aid in completion of ADLs. Status at IE: extension +12 degrees from neutral, flexion 92 degrees. Current: PROM with gentle stretch left knee 10 to 96 degrees. 5/5/2021        Long Term Goals: To be accomplished in 8 weeks:                   Patient will increase strength to 5/5 throughout B LEs to aid in completion of ADLs. Status at IE: left knee flex 3+, ext 3+                   Current: Same as IE                      Patient will report pain less than 1-2/10 average to aid in completion of ADLs.                    Status at IE: 2-9/10                   Current: Same as IE                      Patient will ambulate 1000 ft without device with normal gait pattern to maximize independence and safety in mobility                     Status at IE: household distances with rw with antalgic gait pattern. Current: Same as IE                      Patient will improve FOTO (an established functional score where 100 = no disability) to 46 points overall to demonstrate improvement in functional ability.                    Status at IE:25                   Current: Same as IE    PLAN  []  Upgrade activities as tolerated     [x]  Continue plan of care  []  Update interventions per flow sheet       []  Discharge due to:_  []  Other:_      Danii Mccoy PT, DPT 5/5/2021  12:05 PM    Future Appointments   Date Time Provider Alivia Zepeda   5/7/2021  1:00 PM MARÍA Driscoll [facility]   5/11/2021 11:00 AM MARÍA Fuentes [facility]   5/14/2021  1:00 PM CoxGean Osler, PT [facility]   5/18/2021 10:15 AM MARÍA Fuentes [facility]   5/21/2021  1:00 PM CoxGean Osler, PT [facility]   5/25/2021 11:00 AM MARÍA Fuentes THE [facility]   5/28/2021  1:00 PM Lindy THE [facility]   9/8/2021  8:00 AM Ngoc Taylor MD [facility]

## 2021-05-07 ENCOUNTER — HOSPITAL ENCOUNTER (OUTPATIENT)
Dept: PHYSICAL THERAPY | Age: 61
Discharge: HOME | End: 2021-05-07
Payer: MEDICARE

## 2021-05-07 PROCEDURE — 97016 VASOPNEUMATIC DEVICE THERAPY: CPT

## 2021-05-07 PROCEDURE — 97110 THERAPEUTIC EXERCISES: CPT

## 2021-05-07 PROCEDURE — 97530 THERAPEUTIC ACTIVITIES: CPT

## 2021-05-07 NOTE — PROGRESS NOTES
PT DAILY TREATMENT NOTE    Patient Name: Tana Hwang  Date:2021  : 1960  [x]  Patient  Verified  Payor: [place] MEDICARE / Plan: [place] MEDICARE PART A & B / Product Type: Medicare /    In time: 7374  Out time: 158  Total Treatment Time (min): 59  Total Timed Codes (min): 49  1:1 Treatment Time ( only): 40   Visit #: 3 of 16    Treatment Area: Left knee pain [M25.562]    SUBJECTIVE  Pain Level (0-10 scale): 3  Any medication changes, allergies to medications, adverse drug reactions, diagnosis change, or new procedure performed?: [x] No    [] Yes (see summary sheet for update)  Subjective functional status/changes:   [] No changes reported  \"The left knee felt real good after last session. But, then when I did more stretching at home later in the day it got real painful for a full half day afterwards. \"    OBJECTIVE  Modalities Rationale:     decrease edema and decrease pain to improve patient's ability to Complete ADLS  10 min []  Vasopneumatic Device, press/temp: Low, 38    min []  Other:    [x] Skin assessment post-treatment (if applicable):    [x]  intact    []  redness- no adverse reaction     []redness  adverse reaction:        30 min Therapeutic Exercise:  [x] See flow sheet :   Rationale: increase ROM, increase strength and decrease pain to improve the patients ability to complete ADLs  ambulation safety and efficiency in order to improve patient's ability to safely ambulate at home for self care.         14 min Therapeutic Activity:  [x]  See flow sheet :   Rationale: increase ROM, increase strength and improve coordination  to improve the patients ability to Complete ADLS     With   [] TE   [] TA   [] neuro   [] other: Patient Education: [x] Review HEP    [] Progressed/Changed HEP based on:   [] positioning   [] body mechanics   [] transfers   [] heat/ice application    [] other:      Other Objective/Functional Measures: NA     Pain Level (0-10 scale) post treatment: 2    ASSESSMENT/Changes in Function: Patient report of marked increase pain after aggressive stretching at home indicates patient may be too forceful with home stretches. Instructed patient in how to perform home stretches with gentle persistence versus high force. Patient responds well to treatment session. No adverse effects were noted from today's treatment session. Patient will continue to benefit from skilled PT services to modify and progress therapeutic interventions, address functional mobility deficits, address ROM deficits, address strength deficits, analyze and address soft tissue restrictions, analyze and cue movement patterns, analyze and modify body mechanics/ergonomics, assess and modify postural abnormalities,  and instruct in home and community integration to attain remaining goals. []  See Plan of Care  []  See progress note/recertification  []  See Discharge Summary         Progress towards goals / Updated goals:  Short Term Goals: To be accomplished in 4 weeks:                   DPXJHMI will report compliance with HEP at least 1x/day to aid in rehabilitation program.                   Status at IE: Patient instructed in and provided written copy of initial Home Exercise Program.                   Current: Patient reports performing full HEP bid and heel slide with strap 3-5 times per day. 5/7/2021                      Patient will demonstrate left knee AROM of 0 to 120 degrees to aid in completion of ADLs.                  Status at IE: extension +12 degrees from neutral, flexion 92 degrees.                  Current: PROM with gentle stretch left knee 10 to 99 degrees. 5/7/2021        Long Term Goals: To be accomplished in 8 weeks:                   Patient will increase strength to 5/5 throughout B LEs to aid in completion of ADLs.                    Status at IE: left knee flex 3+, ext 3+                   Current: Same as IE                      Patient will report pain less than 1-2/10 average to aid in completion of ADLs.                  Status at IE: 2-9/10                   Current: Same as IE                      Patient will ambulate 1000 ft without device with normal gait pattern to maximize independence and safety in mobility                     Status at IE: household distances with rw with antalgic gait pattern.                   Current: Same as IE                      Patient will improve FOTO (an established functional score where 100 = no disability) to 46 points overall to demonstrate improvement in functional ability.                    Status at IE:25                   Current: Same as IE    PLAN  []  Upgrade activities as tolerated     [x]  Continue plan of care  []  Update interventions per flow sheet       []  Discharge due to:_  []  Other:_      Danii Mccoy PT, DPT 5/7/2021  1:05 PM    Future Appointments   Date Time Provider Alivia Zepeda   5/11/2021 11:00 AM CoxGean Osler, PT [facility]   5/14/2021  1:00 PM CoxGean Osler, PT [facility]   5/18/2021 10:15 AM MARÍA Fuentes [facility]   5/21/2021  1:00 PM CoxGean Osler, PT [facility]   5/25/2021 11:00 AM MARÍA Fuentes [facility]   5/28/2021  1:00 PM Lindy [facility]   9/8/2021  8:00 AM Ngoc Taylor MD [facility]

## 2021-05-11 ENCOUNTER — APPOINTMENT (OUTPATIENT)
Dept: PHYSICAL THERAPY | Age: 61
End: 2021-05-11
Payer: MEDICARE

## 2021-05-14 ENCOUNTER — HOSPITAL ENCOUNTER (OUTPATIENT)
Dept: PHYSICAL THERAPY | Age: 61
Discharge: HOME | End: 2021-05-14
Payer: MEDICARE

## 2021-05-14 PROCEDURE — 97110 THERAPEUTIC EXERCISES: CPT

## 2021-05-14 PROCEDURE — 97530 THERAPEUTIC ACTIVITIES: CPT

## 2021-05-14 PROCEDURE — 97016 VASOPNEUMATIC DEVICE THERAPY: CPT

## 2021-05-14 NOTE — PROGRESS NOTES
PT DAILY TREATMENT NOTE    Patient Name: Kwabena Helm  Date:2021  : 1960  [x]  Patient  Verified  Payor: [place] MED[place]ARE / Plan: [place] MEDICARE PART A & B / Product Type: Medicare /    In time: 9549  Out time: 7549  Total Treatment Time (min): 54  Total Timed Codes (min): 44  1:1 Treatment Time ([facility] only): 40   Visit #: 4 of 16    Treatment Area: Left knee pain [M25.562]    SUBJECTIVE  Pain Level (0-10 scale): 2-3  Any medication changes, allergies to medications, adverse drug reactions, diagnosis change, or new procedure performed?: [x] No    [] Yes (see summary sheet for update)  Subjective functional status/changes:   [] No changes reported  \"I had a flareup of my MS fatigue and couldn't     OBJECTIVE  Modalities Rationale:     decrease edema and decrease pain to improve patient's ability to Complete ADLS  10 min []  Vasopneumatic Device, press/temp: Medium, low    min []  Other:    [x] Skin assessment post-treatment (if applicable):    [x]  intact    []  redness- no adverse reaction     []redness  adverse reaction:        30 min Therapeutic Exercise:  [x] See flow sheet :   Rationale: increase ROM, increase strength and decrease pain to improve the patients ability to complete ADLs  ambulation safety and efficiency in order to improve patient's ability to safely ambulate at home for self care. 14 min Therapeutic Activity:  [x]  See flow sheet :   Rationale: increase ROM, increase strength and improve coordination  to improve the patients ability to Complete ADLS     With   [] TE   [] TA   [] neuro   [] other: Patient Education: [x] Review HEP    [] Progressed/Changed HEP based on:   [] positioning   [] body mechanics   [] transfers   [] heat/ice application    [] other:      Other Objective/Functional Measures: NA     Pain Level (0-10 scale) post treatment: 2    ASSESSMENT/Changes in Function: Patient ROM and strength gradually improving, but pain persisting.  Patient responds well to treatment session. No adverse effects were noted from today's treatment session. Patient will continue to benefit from skilled PT services to modify and progress therapeutic interventions, address functional mobility deficits, address ROM deficits, address strength deficits, analyze and address soft tissue restrictions, analyze and cue movement patterns, analyze and modify body mechanics/ergonomics, assess and modify postural abnormalities,  and instruct in home and community integration to attain remaining goals. []  See Plan of Care  []  See progress note/recertification  []  See Discharge Summary         Progress towards goals / Updated goals:  Short Term Goals: To be accomplished in 4 weeks:                   EUEOYTH will report compliance with HEP at least 1x/day to aid in rehabilitation program.                   Status at IE: Patient instructed in and provided written copy of initial Home Exercise Program.                   Current: Patient reports performing full HEP bid and heel slide with strap 3-5 times per day. 5/7/2021                      Patient will demonstrate left knee AROM of 0 to 120 degrees to aid in completion of ADLs.                  Status at IE: extension +12 degrees from neutral, flexion 92 degrees.                  Current: PROM with gentle stretch left knee 9to 103 degrees.      5/14/2021        Long Term Goals: To be accomplished in 8 weeks:                   Patient will increase strength to 5/5 throughout B LEs to aid in completion of ADLs.                  Status at IE: left knee flex 3+, ext 3+                   Current: Same as IE                      Patient will report pain less than 1-2/10 average to aid in completion of ADLs.                    Status at IE: 2-9/10                   Current: Same as IE                      Patient will ambulate 1000 ft without device with normal gait pattern to maximize independence and safety in mobility                     Status at IE: household distances with rw with antalgic gait pattern.                   Current: Same as IE                      Patient will improve FOTO (an established functional score where 100 = no disability) to 46 points overall to demonstrate improvement in functional ability.                    Status at IE:25                   Current: Same as IE    PLAN  []  Upgrade activities as tolerated     [x]  Continue plan of care  []  Update interventions per flow sheet       []  Discharge due to:_  []  Other:_      Antoinette Hernandez, PT, DPT 5/14/2021  1:10 PM    Future Appointments   Date Time Provider Alivia Zepeda   5/18/2021 10:15 AM MARÍA Gonzales THE [facility]   5/21/2021  1:00 PM MARÍA Goodwin [facility]   5/25/2021 11:00 AM MARÍA Gonzales THE [facility]   5/28/2021  1:00 PM Lindy THE [facility]   9/8/2021  8:00 AM Mayra Santiago MD [facility]

## 2021-05-18 ENCOUNTER — HOSPITAL ENCOUNTER (OUTPATIENT)
Dept: PHYSICAL THERAPY | Age: 61
Discharge: HOME | End: 2021-05-18
Payer: MEDICARE

## 2021-05-18 PROCEDURE — 97110 THERAPEUTIC EXERCISES: CPT

## 2021-05-18 PROCEDURE — 97530 THERAPEUTIC ACTIVITIES: CPT

## 2021-05-18 PROCEDURE — 97016 VASOPNEUMATIC DEVICE THERAPY: CPT

## 2021-05-18 NOTE — PROGRESS NOTES
PT DAILY TREATMENT NOTE - [facility]     Patient Name: Paula Day  Date:2021  : 1960  [x]  Patient  Verified  Payor: [place] MEDICARE / Plan: [place] MEDICARE PART A & B / Product Type: Medicare /    In time:1021  Out time:1115  Total Treatment Time (min): 54  Total Timed Codes (min): 44   1:1 Treatment Time ([facility] only): 38  Visit #: 5 of 16    Treatment Area: Left knee pain [M25.562]    SUBJECTIVE  Pain Level (0-10 scale): 2  Any medication changes, allergies to medications, adverse drug reactions, diagnosis change, or new procedure performed?: [x] No    [] Yes (see summary sheet for update)  Subjective functional status/changes:   [] No changes reported  Patient reports no new changes since last visit. OBJECTIVE    OBJECTIVE  Modalities Rationale:     decrease edema and decrease pain to improve patient's ability to Complete ADLS         10 min []? Vasopneumatic Device, press/temp: Medium, low     min []? Other:     [x]? Skin assessment post-treatment (if applicable):    [x]? intact    []? redness- no adverse reaction     []? redness  adverse reaction:        25 min Therapeutic Exercise:  [x] See flow sheet :   Rationale: increase ROM, increase strength and decrease pain to improve the patients ability to complete ADLs    13 min Therapeutic Activity:  [x]  See flow sheet :   Rationale: increase ROM, increase strength and improve coordination  to improve the patients ability to complete ADLs           With   [x] TE   [] TA   [] neuro   [] other: Patient Education: [x] Review HEP    [] Progressed/Changed HEP based on:   [] positioning   [] body mechanics   [] transfers   [] heat/ice application    [] other:      Other Objective/Functional Measures: NA     Pain Level (0-10 scale) post treatment: 3    ASSESSMENT/Changes in Function: Patient responds well to treatment session. Patient required cues to promote even weight distribution with STS exercise.  Provided a mirror for visual feedback and she demonstrated improved mechanics. She is still challenged during SLR secondary to quad lag. No adverse effects were noted from today's treatment session      Patient will continue to benefit from skilled PT services to modify and progress therapeutic interventions, address functional mobility deficits, address ROM deficits, address strength deficits, analyze and address soft tissue restrictions, analyze and cue movement patterns, analyze and modify body mechanics/ergonomics, assess and modify postural abnormalities, address imbalance and instruct in home and community integration to attain remaining goals. []  See Plan of Care  []  See progress note/recertification  []  See Discharge Summary         Progress towards goals / Updated goals:  Short Term Goals: To be accomplished in 4 weeks:                   Patient will report compliance with HEP at least 1x/day to aid in rehabilitation program.                   Status at IE: Patient instructed in and provided written copy of initial Home Exercise Program.                   Current: Patient reports performing full HEP bid and heel slide with strap 3-5 times per day.    5/7/2021                      Patient will demonstrate left knee AROM of 0 to 120 degrees to aid in completion of ADLs.                  Status at IE: extension +12 degrees from neutral, flexion 92 degrees.                  Current: PROM with gentle stretch left knee 9 to 110 degrees.      5/18/2021        Long Term Goals: To be accomplished in 8 weeks:                   Patient will increase strength to 5/5 throughout B LEs to aid in completion of ADLs.                  Status at IE: left knee flex 3+, ext 3+                   Current: Same as IE                      Patient will report pain less than 1-2/10 average to aid in completion of ADLs.                    Status at IE: 2-9/10                   Current: Same as IE                      Patient will ambulate 1000 ft without device with normal gait pattern to maximize independence and safety in mobility                     Status at IE: household distances with rw with antalgic gait pattern.                   Current: Same as IE                      Patient will improve FOTO (an established functional score where 100 = no disability) to 46 points overall to demonstrate improvement in functional ability.                    Status at IE:25                   Current: Same as IE  PLAN  []  Upgrade activities as tolerated     [x]  Continue plan of care  []  Update interventions per flow sheet       []  Discharge due to:_  []  Other:_      Kenya Buckner, PT, DPT 5/18/2021  10:16 AM    Future Appointments   Date Time Provider Alivia Zepeda   5/21/2021  1:00 PM Mone Baez, PT [facility]   5/25/2021 11:00 AM MARÍA Jimenez[facility]PNOLAN [facility]   5/28/2021  1:00 PM Lindy [facility]   9/8/2021  8:00 AM Zuri Silverio MD [facility]

## 2021-05-21 ENCOUNTER — HOSPITAL ENCOUNTER (OUTPATIENT)
Dept: PHYSICAL THERAPY | Age: 61
Discharge: HOME | End: 2021-05-21
Payer: MEDICARE

## 2021-05-21 PROCEDURE — 97110 THERAPEUTIC EXERCISES: CPT

## 2021-05-21 PROCEDURE — 97530 THERAPEUTIC ACTIVITIES: CPT

## 2021-05-21 PROCEDURE — 97016 VASOPNEUMATIC DEVICE THERAPY: CPT

## 2021-05-21 NOTE — PROGRESS NOTES
PT DAILY TREATMENT NOTE    Patient Name: Tana Hwang  Date:2021  : 1960  [x]  Patient  Verified  Payor: [address] [place] [address] / Plan: [place] MEDICARE PART A & B / Product Type: Medicare /    In time: 1:03   Out time: 1:58  Total Treatment Time (min): 55  Total Timed Codes (min): 45  1:1 Treatment Time ([facility] only): 40   Visit #: 6 of 16    Treatment Area: Left knee pain [M25.562]    SUBJECTIVE  Pain Level (0-10 scale): 2  Any medication changes, allergies to medications, adverse drug reactions, diagnosis change, or new procedure performed?: [x] No    [] Yes (see summary sheet for update)  Subjective functional status/changes:   [] No changes reported  \"I am having less pain and am moving around better. \"    OBJECTIVE  Modalities Rationale:     decrease edema and decrease pain to improve patient's ability to Complete ADLS  10 min []  Vasopneumatic Device, press/temp: Low, low    min []  Other:    [x] Skin assessment post-treatment (if applicable):    [x]  intact    []  redness- no adverse reaction     []redness  adverse reaction:        30 min Therapeutic Exercise:  [x] See flow sheet :   Rationale: increase ROM, increase strength and decrease pain to improve the patients ability to complete ADLs  ambulation safety and efficiency in order to improve patient's ability to safely ambulate at home for self care. 10 min Therapeutic Activity:  [x]  See flow sheet :   Rationale: increase ROM, increase strength and improve coordination  to improve the patients ability to Complete ADLS     With   [] TE   [] TA   [] neuro   [] other: Patient Education: [x] Review HEP    [] Progressed/Changed HEP based on:   [] positioning   [] body mechanics   [] transfers   [] heat/ice application    [] other:      Other Objective/Functional Measures: NA     Pain Level (0-10 scale) post treatment: 0    ASSESSMENT/Changes in Function: Patient reporting marked decrease in pain intensity and increased ease of ambulation.  Patient responds well to treatment session. No adverse effects were noted from today's treatment session. Patient will continue to benefit from skilled PT services to modify and progress therapeutic interventions, address functional mobility deficits, address ROM deficits, address strength deficits, analyze and address soft tissue restrictions, analyze and cue movement patterns, analyze and modify body mechanics/ergonomics, assess and modify postural abnormalities,  and instruct in home and community integration to attain remaining goals. []  See Plan of Care  []  See progress note/recertification  []  See Discharge Summary         Progress towards goals / Updated goals:  Short Term Goals: To be accomplished in 4 weeks:                   Patient will report compliance with HEP at least 1x/day to aid in rehabilitation program.                   Status at IE: Patient instructed in and provided written copy of initial Home Exercise Program.                   Current: Patient reports performing full HEP bid and heel slide with strap 3-5 times per day.    5/7/2021                      Patient will demonstrate left knee AROM of 0 to 120 degrees to aid in completion of ADLs.                  Status at IE: extension +12 degrees from neutral, flexion 92 degrees.                  Current: PROM with gentle stretch left knee 9 to 110 degrees.      5/18/2021        Long Term Goals: To be accomplished in 8 weeks:                   Patient will increase strength to 5/5 throughout B LEs to aid in completion of ADLs.                  Status at IE: left knee flex 3+, ext 3+                   Current: Same as IE                      Patient will report pain less than 1-2/10 average to aid in completion of ADLs.                  Status at IE: 2-9/10                   Current: Pain now more stable in 2-4/10 range and now not using pain medication.     5/21/21                      Patient will ambulate 1000 ft without device with normal gait pattern to maximize independence and safety in mobility                     Status at IE: household distances with rw with antalgic gait pattern.                   Current: Same as IE                      Patient will improve FOTO (an established functional score where 100 = no disability) to 46 points overall to demonstrate improvement in functional ability.                    Status at IE:25                   Current: Same as IE    PLAN  []  Upgrade activities as tolerated     [x]  Continue plan of care  []  Update interventions per flow sheet       []  Discharge due to:_  []  Other:_      Wendy Platt PT, DPT 5/21/2021  1:12 PM    Future Appointments   Date Time Provider Alivia Zepeda   5/25/2021 11:00 AM [facility]   5/28/2021  1:00 PM [facility] MARÍA GIRON [facility]   9/8/2021  8:00 AM Romie Meckel, MD [facility]

## 2021-05-25 ENCOUNTER — HOSPITAL ENCOUNTER (OUTPATIENT)
Dept: PHYSICAL THERAPY | Age: 61
Discharge: HOME | End: 2021-05-25
Payer: MEDICARE

## 2021-05-25 PROCEDURE — 97530 THERAPEUTIC ACTIVITIES: CPT

## 2021-05-25 PROCEDURE — 97110 THERAPEUTIC EXERCISES: CPT

## 2021-05-25 PROCEDURE — 97016 VASOPNEUMATIC DEVICE THERAPY: CPT

## 2021-05-25 NOTE — PROGRESS NOTES
PT DAILY TREATMENT NOTE - [facility]     Patient Name: Sai Clarity  Date:2021  : 1960  [x]  Patient  Verified  Payor: [place] MEDICARE / Plan: VA MEDICARE PART A & B / Product Type: Medicare /    In time:1103  Out time:1200  Total Treatment Time (min): 57  Total Timed Codes (min): 47   1:1 Treatment Time ( [address] only): 52   Visit #: 7  16    Treatment Area: Left knee pain [M25.562]    SUBJECTIVE  Pain Level (0-10 scale): 0  Any medication changes, allergies to medications, adverse drug reactions, diagnosis change, or new procedure performed?: [x] No    [] Yes (see summary sheet for update)  Subjective functional status/changes:   [] No changes reported  Patient reports that she has been more focused on knee extension with HEP activities. OBJECTIVE    Modalities Rationale:     decrease edema and decrease pain to improve patient's ability to Complete ADLS         10 min [x]? Vasopneumatic Device, press/temp: Low, low     min []? Other:     [x]? Skin assessment post-treatment (if applicable):    [x]? intact    []? redness- no adverse reaction     []? redness  adverse reaction:        35 min Therapeutic Exercise:  [x] See flow sheet :   Rationale: increase ROM, increase strength and decrease pain to improve the patients ability to complete ADLs    12 min Therapeutic Activity:  [x]  See flow sheet :   Rationale: increase ROM, increase strength and improve coordination  to improve the patients ability to complete ADLs          With   [x] TE   [] TA   [] neuro   [] other: Patient Education: [x] Review HEP    [] Progressed/Changed HEP based on:   [] positioning   [] body mechanics   [] transfers   [] heat/ice application    [] other:      Other Objective/Functional Measures: NA     Pain Level (0-10 scale) post treatment: 0    ASSESSMENT/Changes in Function: Patient responds well to treatment session.  Provided several cues for patient to focus on knee extension throughout treatment as she has significant limitations in knee extension. She responded well to cues demonstrating improved exercise mechanics. Will advance exercise as tolerated. No adverse effects were noted from today's treatment session      Patient will continue to benefit from skilled PT services to modify and progress therapeutic interventions, address functional mobility deficits, address ROM deficits, address strength deficits, analyze and address soft tissue restrictions, analyze and cue movement patterns, analyze and modify body mechanics/ergonomics, assess and modify postural abnormalities, address imbalance and instruct in home and community integration to attain remaining goals. []  See Plan of Care  []  See progress note/recertification  []  See Discharge Summary         Progress towards goals / Updated goals:  Short Term Goals: To be accomplished in 4 weeks:                   Patient will report compliance with HEP at least 1x/day to aid in rehabilitation program.                   Status at IE: Patient instructed in and provided written copy of initial Home Exercise Program.                   Current: Patient reports performing full HEP bid and heel slide with strap 3-5 times per day.    5/7/2021                      Patient will demonstrate left knee AROM of 0 to 120 degrees to aid in completion of ADLs.                  Status at IE: extension +12 degrees from neutral, flexion 92 degrees.                  Current: In-progress, PROM  degrees.      5/25/2021        Long Term Goals: To be accomplished in 8 weeks:                   Patient will increase strength to 5/5 throughout B LEs to aid in completion of ADLs.                  Status at IE: left knee flex 3+, ext 3+                   Current: Same as IE                      Patient will report pain less than 1-2/10 average to aid in completion of ADLs.                    Status at IE: 2-9/10                   Current: Pain now more stable in 2-4/10 range and now not using pain medication. 5/21/21                      Patient will ambulate 1000 ft without device with normal gait pattern to maximize independence and safety in mobility                     Status at IE: household distances with rw with antalgic gait pattern.                   Current: Same as IE                      Patient will improve FOTO (an established functional score where 100 = no disability) to 46 points overall to demonstrate improvement in functional ability.                    Status at IE:25                   Current: Same as IE    PLAN  []  Upgrade activities as tolerated     [x]  Continue plan of care  []  Update interventions per flow sheet       []  Discharge due to:_  []  Other:_      Sha Kimble, PT, DPT 5/25/2021  11:02 AM    Future Appointments   Date Time Provider Alivia Zepeda   5/28/2021  1:00 PM Lindy [facility]   6/2/2021  2:30 PM Charlene Tyson PT [facility]   6/4/2021  8:00 AM MARÍA SwansonY [facility]   6/8/2021 11:45 AM Charlene Tyson PT [facility]   6/11/2021  1:00 PM Savannah Hargrove PT [facility]   6/15/2021  1:00 PM Charlene Tyson PT [facility]   6/23/2021  1:45 PM Savannah Hargrove PT [facility]   6/25/2021  1:00 PM Sheba Newton PT [facility]   6/29/2021  1:00 PM Charlene Tyson PT [facility]   9/8/2021  8:00 AM Shelby Asencio MD [facility]

## 2021-05-28 ENCOUNTER — HOSPITAL ENCOUNTER (OUTPATIENT)
Dept: PHYSICAL THERAPY | Age: 61
Discharge: HOME | End: 2021-05-28
Payer: MEDICARE

## 2021-05-28 PROCEDURE — 97140 MANUAL THERAPY 1/> REGIONS: CPT

## 2021-05-28 PROCEDURE — 97530 THERAPEUTIC ACTIVITIES: CPT

## 2021-05-28 PROCEDURE — 97110 THERAPEUTIC EXERCISES: CPT

## 2021-05-28 PROCEDURE — 97016 VASOPNEUMATIC DEVICE THERAPY: CPT

## 2021-05-28 NOTE — PROGRESS NOTES
PT DAILY TREATMENT NOTE     Patient Name: Leon Perla  Date:2021  : 1960  [x]  Patient  Verified  Payor: [place] MEDICARE / Plan: [place] MEDICARE PART A & B / Product Type: Medicare /    In time:1:02 PM  Out time:1:53 pm  Total Treatment Time (min): 48  Visit #: 8 of 16    Medicare/BCBS Only   Total Timed Codes (min):  43 1:1 Treatment Time:  43       Treatment Area: Left knee pain [M25.562]    SUBJECTIVE  Pain Level (0-10 scale): 0  Any medication changes, allergies to medications, adverse drug reactions, diagnosis change, or new procedure performed?: [x] No    [] Yes (see summary sheet for update)  Subjective functional status/changes:   [] No changes reported  \"It's not straightening like I want it to. \"    OBJECTIVE    Modality rationale: decrease edema, decrease inflammation and decrease pain to improve the patients ability to ease post session soreness   Min Type Additional Details    [] Estim:  []Unatt       []IFC  []Premod                        []Other:  []w/ice   []w/heat  Position:  Location:    [] Estim: []Att    []TENS instruct  []NMES                    []Other:  []w/US   []w/ice   []w/heat  Position:  Location:    []  Traction: [] Cervical       []Lumbar                       [] Prone          []Supine                       []Intermittent   []Continuous Lbs:  [] before manual  [] after manual    []  Ultrasound: []Continuous   [] Pulsed                           []1MHz   []3MHz W/cm2:  Location:    []  Iontophoresis with dexamethasone         Location: [] Take home patch   [] In clinic    []  Ice     []  heat  []  Ice massage  []  Laser   []  Anodyne Position:  Location:    []  Laser with stim  []  Other:  Position:  Location:   10 [x]  Vasopneumatic Device Pressure:       [x] lo [] med [] hi   Temperature: [x] lo [] med [] hi   [] Skin assessment post-treatment:  []intact []redness- no adverse reaction    []redness  adverse reaction:         24 min Therapeutic Exercise:  [x] See flow sheet :   Rationale: increase ROM, increase strength and improve coordination to improve the patients ability to increase ease with ADLs    9 min Therapeutic Activity:  [x]  See flow sheet : bridge, sit<>stand   Rationale: increase strength and improve coordination  to improve the patients ability to ease with ADLs     10 min Manual Therapy:  [x]  See flow sheet : in supine: PROM to left knee, left patellar mobs, grade II tibiofemoral ext mobs, popliteal release. Rationale: increase strength and improve coordination  to improve the patients ability to increase ROM          With   [] TE   [] TA   [] neuro   [] other: Patient Education: [x] Review HEP    [] Progressed/Changed HEP based on:   [] positioning   [] body mechanics   [] transfers   [] heat/ice application    [] other:      Other Objective/Functional Measures:   Left knee flexion: 3+/5. Ext, 4/5     Pain Level (0-10 scale) post treatment: 2    ASSESSMENT/Changes in Function:   Patient with guick fatigue noted with standing exercise. She is still lacking in extension--added manual to promote TKE. Also added TKE with band void of adverse effects. Continue to focus on increasing knee ROM. Patient will continue to benefit from skilled PT services to modify and progress therapeutic interventions, address functional mobility deficits, address ROM deficits, address strength deficits, analyze and address soft tissue restrictions, analyze and cue movement patterns, analyze and modify body mechanics/ergonomics, assess and modify postural abnormalities and instruct in home and community integration to attain remaining goals.      []  See Plan of Care  []  See progress note/recertification  []  See Discharge Summary         Progress towards goals / Updated goals:  Short Term Goals: To be accomplished in 4 weeks:                   MILAN will report compliance with HEP at least 1x/day to aid in rehabilitation program.                   Status at IE: Patient instructed in and provided written copy of initial Home Exercise Program.                   Current: Patient reports performing full HEP bid and heel slide with strap 3-5 times per day.    5/7/2021                      Patient will demonstrate left knee AROM of 0 to 120 degrees to aid in completion of ADLs.                  Status at IE: extension +12 degrees from neutral, flexion 92 degrees.                  Current: In-progress, PROM  degrees.      5/25/2021        Long Term Goals: To be accomplished in 8 weeks:                   Patient will increase strength to 5/5 throughout B LEs to aid in completion of ADLs.                  Status at IE: left knee flex 3+, ext 3+                   Current: progressing, left knee flexion 3+/5. Ext: 4/5 (5/28/21)                      Patient will report pain less than 1-2/10 average to aid in completion of ADLs.                  Status at IE: 2-9/10                   Current: Pain now more stable in 2-4/10 range and now not using pain medication.    5/21/21                      Patient will ambulate 1000 ft without device with normal gait pattern to maximize independence and safety in mobility                     Status at IE: household distances with rw with antalgic gait pattern.                   Current: Same as IE                      Patient will improve FOTO (an established functional score where 100 = no disability) to 46 points overall to demonstrate improvement in functional ability.                    Status at IE:25                   Current: Same as IE    PLAN  []  Upgrade activities as tolerated     [x]  Continue plan of care  []  Update interventions per flow sheet       []  Discharge due to:_  []  Other:_      Jose M Dolan 5/28/2021  1:00 PM    Future Appointments   Date Time Provider Alivia Zepeda   6/2/2021  2:30 PM MARÍA Padron [facility]   6/4/2021  8:00 AM MARÍA Tompkins [facility]   6/8/2021 11:45 AM Jeimy Moss PT [facility]   6/11/2021  1:00 PM Josseline Arenas, PT [facility]   6/15/2021  1:00 PM Patti Barkley, PT [facility]   6/23/2021  1:45 PM Josseline Arenas, PT [facility]   6/25/2021  1:00 PM Josseline Arenas, PT [facility]   6/29/2021  1:00 PM Patti Barkley, PT [facility]   9/8/2021  8:00 AM Princess Cazares MD [facility] AMB

## 2021-06-03 ENCOUNTER — HOSPITAL ENCOUNTER (OUTPATIENT)
Dept: PHYSICAL THERAPY | Age: 61
Discharge: HOME | End: 2021-06-03
Payer: MEDICARE

## 2021-06-03 PROCEDURE — 97110 THERAPEUTIC EXERCISES: CPT | Performed by: PHYSICAL THERAPIST

## 2021-06-03 NOTE — PROGRESS NOTES
[facility] [place] [address] [place] [address]  Phone: [phone]   Fax: [phone]    Medicare Progress Report      Patient name: Tana Hwang     Start of Care: 2021  Referral source: Angelica Cunningham MD    : 1960  Medical/Treatment Diagnosis: Left knee pain [M25.562]  Onset Date:2021 TKR  Prior Hospitalization: see medical history   Provider#: [id number]  Prior Level of Function: Walk two miles per day, bicycling   Comorbidities: MS, OA, depression, hypothyroid, GERD, right partial TKA   Medications: Verified on Patient Summary List    Visits from Start of Care: 9    Missed Visits: 1  Reporting Period : 2021 to 6/3/2021    Subjective Reports: Pt reports feels she is 60% better since the start of therapy , pain is less frequent and less intense , able to do steps but still one at time with cane and rail   No problems getting in and out of car   Doing well getting up and down on couch and chair . Pt feels her biggest challenge is getting it to go straight   Short Term Goals: To be accomplished in 4 weeks:                   Patient will report compliance with HEP at least 1x/day to aid in rehabilitation program.                   Status at IE: Patient instructed in and provided written copy of initial Home Exercise Program.                   Current: Patient reports continuing to perform full HEP 1-2x day ,  and heel slide with strap 3-5 times per day.  6/3/2021 MET                          Patient will demonstrate left knee AROM of 0 to 120 degrees to aid in completion of ADLs.                  Status at IE: extension +12 degrees from neutral, flexion 92 degrees.                  Current: In-progress, PROM 5-108 degrees.      6/3//2021           Long Term Goals: To be accomplished in 8 weeks:                   Patient will increase strength to 5/5 throughout B LEs to aid in completion of ADLs.                    Status at IE: left knee flex 3+, ext 3+                   Current: progressing, left knee flexion 3+/5. Ext: 4/5 (5/28/21)                      Patient will report pain less than 1-2/10 average to aid in completion of ADLs.                  Status at IE: 2-9/10                   Current: Pain ranges from 0-5/10 ( 5 only if she pushes it and does too much ) most days only up to 2-3/10 , progressing 6/3/2021                       Patient will ambulate 1000 ft without device with normal gait pattern to maximize independence and safety in mobility                     Status at IE: household distances with rw with antalgic gait pattern.                   Current: walking with cane consistently (needs cues for gait pattern tends to hold left leg stiff  + limp )  , able to walk for couple hours with cane per pt , progressing 6/3/2021                       Patient will improve FOTO (an established functional score where 100 = no disability) to 46 points overall to demonstrate improvement in functional ability.                  Status at IE:25                 BHICIPR: 42/100 progressing 6/3/2021      Key functional changes: slowly increasing ROM , increase distance walking with SC , increase resistance with exercises and increase step height with step up and over, slowly progressing balance, increase functional report by patient     Problems/ barriers to goal attainment: SLS to 5 sec with increase sway for balance , tends to keep her knee straight and stiff in gait , ROM still limited      Assessment / Recommendations:pt is motivated with HEP and will continue to progress toward goals .      Problem List: pain affecting function, decrease ROM, decrease strength, edema affecting function, impaired gait/ balance, decrease ADL/ functional abilitiies, decrease activity tolerance, decrease flexibility/ joint mobility and decrease transfer abilities   Treatment Plan: Therapeutic exercise, Therapeutic activities, Neuromuscular re-education, Physical agent/modality, Gait/balance training, Manual therapy, Aquatic therapy, Patient education, Self Care training, Functional mobility training, Home safety training and Stair training    Patient Goal (s) has been updated and includes:continue toward strengthening and straightening left  knee      Updated Goals to be accomplished in 4 weeks:  Continue toward unmet goals     Frequency / Duration: Patient to be seen 2 times per week for 4 weeks: The severity rating is based on clinical judgement and the FOTO score.       Fallon Marin, PT 6/3/2021 4:14 PM

## 2021-06-03 NOTE — PROGRESS NOTES
PT DAILY TREATMENT NOTE    Patient Name: Sai Clarity  Date:6/3/2021  : 1960  [x]  Patient  Verified  Payor: Jacob Oviedo / Plan: [place] MEDICARE PART A & B / Product Type: Medicare /    In time:  Out time: (pt had to leave to  daughter )  Total Treatment Time (min): 36  Total Timed Codes (min): 36  1:1 Treatment Time ( only): 36   Visit #: 9 of 16    Treatment Area: Left knee pain [M25.562]    SUBJECTIVE  Pain Level (0-10 scale): 2  Any medication changes, allergies to medications, adverse drug reactions, diagnosis change, or new procedure performed?: [x] No    [] Yes (see summary sheet for update)  Subjective functional status/changes:   [] No changes reported  Pt reports feels she is 60% better since the start of therapy , pain is less frequent and less intense , able to do steps but still one at time with cane and rail   No problems getting in and out of car   Doing well getting up and down on couch and chair . Biggest challenge is getting it to go straight     OBJECTIVE  36 min Therapeutic Exercise:  [x]? See flow sheet :   Rationale: increase ROM, increase strength and improve coordination to improve the patients ability to increase ease with ADLs              With   [] TE   [] TA   [] neuro   [] other: Patient Education: [x] Review HEP    [] Progressed/Changed HEP based on:   [] positioning   [] body mechanics   [] transfers   [] heat/ice application    [] other:      Other Objective/Functional Measures: 25 sec on right without increase sway , 5-6 sec on left increase sway     Sit to stand 19\"  4# 2x10 reps     ROM : 5 deg from full extension to 108 deg flexion   FOTO : 52/100     Pain Level (0-10 scale) post treatment: 2    ASSESSMENT/Changes in Function: pt progressing well still difficulty with ROM but it is progressing noted improvedment in extension mobility today , able to increase step height with forward and lateral step.  Cues to walk with decrease limp allow left knee to bend with gait . Balance still difficult SLS to 5 sec     Patient will continue to benefit from skilled PT services to modify and progress therapeutic interventions, address functional mobility deficits, address ROM deficits, address strength deficits, analyze and address soft tissue restrictions, analyze and cue movement patterns, analyze and modify body mechanics/ergonomics, assess and modify postural abnormalities and address imbalance/dizziness to attain remaining goals. []  See Plan of Care  [x]  See progress note/recertification  []  See Discharge Summary         Progress towards goals / Updated goals:  Short Term Goals: To be accomplished in 4 weeks:                   Patient will report compliance with HEP at least 1x/day to aid in rehabilitation program.                   Status at IE: Patient instructed in and provided written copy of initial Home Exercise Program.                   Current: Patient reports continuing to perform full HEP 1-2x day ,  and heel slide with strap 3-5 times per day.  6/3/2021 MET                         Patient will demonstrate left knee AROM of 0 to 120 degrees to aid in completion of ADLs.                  Status at IE: extension +12 degrees from neutral, flexion 92 degrees.                  Current: In-progress, PROM 5-108 degrees.      6/3//2021          Long Term Goals: To be accomplished in 8 weeks:                   Patient will increase strength to 5/5 throughout B LEs to aid in completion of ADLs.                  Status at IE: left knee flex 3+, ext 3+                   Current: progressing, left knee flexion 3+/5. Ext: 4/5 (5/28/21)                      Patient will report pain less than 1-2/10 average to aid in completion of ADLs.                    Status at IE: 2-9/10                   Current: Pain ranges from 0-5/10 ( 5 only if she pushes it and does too much ) most days only up to 2-3/10 , progressing 6/3/2021                       Patient will ambulate 1000 ft without device with normal gait pattern to maximize independence and safety in mobility                     Status at IE: household distances with rw with antalgic gait pattern.                   Current: walking with cane consistently (needs cues for gait pattern tends to hold left leg stiff  + limp )  , able to walk for couple hours with cane per pt , progressing 6/3/2021                       Patient will improve FOTO (an established functional score where 100 = no disability) to 46 points overall to demonstrate improvement in functional ability.                    Status at IE:25                 OBIOQIN: 42/100 progressing 6/3/2021     PLAN  [x]  Upgrade activities as tolerated     [x]  Continue plan of care  []  Update interventions per flow sheet       []  Discharge due to:_  []  Other:_      Anali Casillas, PT 6/3/2021  3:23 PM    Future Appointments   Date Time Provider Alivia Zepeda   6/8/2021 11:45 AM MARÍA Pena [facility]   6/11/2021  1:00 PM Lindy [facility]   6/15/2021  1:00 PM MARÍA Pena [facility]   6/23/2021  1:45 PM MARÍA Ray [facility]   6/25/2021  1:00 PM MARÍA Dao [facility]   6/29/2021  1:00 PM [facility]A Pena [facility]   9/8/2021  8:00 AM [facility]

## 2021-06-04 ENCOUNTER — APPOINTMENT (OUTPATIENT)
Dept: PHYSICAL THERAPY | Age: 61
End: 2021-06-04
Payer: MEDICARE

## 2021-06-08 ENCOUNTER — HOSPITAL ENCOUNTER (OUTPATIENT)
Dept: PHYSICAL THERAPY | Age: 61
Discharge: HOME | End: 2021-06-08
Payer: MEDICARE

## 2021-06-08 PROCEDURE — 97530 THERAPEUTIC ACTIVITIES: CPT

## 2021-06-08 PROCEDURE — 97110 THERAPEUTIC EXERCISES: CPT

## 2021-06-08 PROCEDURE — 97016 VASOPNEUMATIC DEVICE THERAPY: CPT

## 2021-06-08 NOTE — PROGRESS NOTES
PT DAILY TREATMENT NOTE - [facility]     Patient Name: Christofer Gutierrez  Date:2021  : 1960  [x]  Patient  Verified  Payor: [place] MEDICARE / Plan: [place] MEDICARE PART A & B / Product Type: Medicare /    In time:1145  Out time:1245  Total Treatment Time (min): 60  Total Timed Codes (min): 50   1:1 Treatment Time ( [address] only): 60   Visit #: 10 of 16    Treatment Area: Left knee pain [M25.562]    SUBJECTIVE  Pain Level (0-10 scale): 1  Any medication changes, allergies to medications, adverse drug reactions, diagnosis change, or new procedure performed?: [x] No    [] Yes (see summary sheet for update)  Subjective functional status/changes:   [] No changes reported    Patient reports that she is starting to see more improvement each day. OBJECTIVE    Modalities Rationale:     decrease edema and decrease pain to improve patient's ability to Complete ADLS              10 min [x]? ?  Vasopneumatic Device, press/temp: Low, low     min []? ?  Other:     [x]? ? Skin assessment post-treatment (if applicable):    [x]? ?  intact    []? ?  redness- no adverse reaction     []? ?redness  adverse reaction:        35 min Therapeutic Exercise:  [x] See flow sheet :   Rationale: increase ROM, increase strength and decrease pain to improve the patients ability to complete ADLs    15 min Therapeutic Activity:  [x]  See flow sheet :   Rationale: increase ROM, increase strength and improve coordination  to improve the patients ability to complete ADLs           With   [x] TE   [] TA   [] neuro   [] other: Patient Education: [x] Review HEP    [] Progressed/Changed HEP based on:   [] positioning   [] body mechanics   [] transfers   [] heat/ice application    [] other:      Other Objective/Functional Measures: NA     Pain Level (0-10 scale) post treatment: 0    ASSESSMENT/Changes in Function: Patient responds well to treatment session. Patient demonstrated improved activity tolerance. Advanced exercise as a result by increasing resistance. Will continue to advance exercise next visit by introducing leg press. . No adverse effects were noted from today's treatment session    Patient will continue to benefit from skilled PT services to modify and progress therapeutic interventions, address functional mobility deficits, address ROM deficits, address strength deficits, analyze and address soft tissue restrictions, analyze and cue movement patterns, analyze and modify body mechanics/ergonomics, assess and modify postural abnormalities, address imbalance and instruct in home and community integration to attain remaining goals. []  See Plan of Care  []  See progress note/recertification  []  See Discharge Summary         Progress towards goals / Updated goals:  Short Term Goals: To be accomplished in 4 weeks:                   Patient will report compliance with HEP at least 1x/day to aid in rehabilitation program.                   Status at IE: Patient instructed in and provided written copy of initial Home Exercise Program.                   Current: Patient reports continuing to perform full HEP 1-2x day ,  and heel slide with strap 3-5 times per day.  6/3/2021 Met                      Patient will demonstrate left knee AROM of 0 to 120 degrees to aid in completion of ADLs.                  Status at IE: extension +12 degrees from neutral, flexion 92 degrees.                  Current: In-progress, PROM 4-1112 degrees.      6/8/2021     Long Term Goals: To be accomplished in 8 weeqks:                   Patient will increase strength to 5/5 throughout B LEs to aid in completion of ADLs.                  Status at IE: left knee flex 3+, ext 3+                   Current: progressing, left knee flexion 3+/5. Ext: 4/5 (5/28/21)                      Patient will report pain less than 1-2/10 average to aid in completion of ADLs.                    Status at IE: 2-9/10                   Current: Pain ranges from 0-5/10 ( 5 only if she pushes it and does too much ) most days only up to 2-3/10 , progressing 6/3/2021                       Patient will ambulate 1000 ft without device with normal gait pattern to maximize independence and safety in mobility                     Status at IE: household distances with rw with antalgic gait pattern.                   Current: walking with cane consistently (needs cues for gait pattern tends to hold left leg stiff  + limp )  , able to walk for couple hours with cane per pt , progressing 6/3/2021                       Patient will improve FOTO (an established functional score where 100 = no disability) to 46 points overall to demonstrate improvement in functional ability.                    Status at IE:25                 Current: 42/100 progressing 6/3/2021     PLAN  []  Upgrade activities as tolerated     [x]  Continue plan of care  []  Update interventions per flow sheet       []  Discharge due to:_  []  Other:_      Paul Hammans, PT, DPT 6/8/2021  11:52 AM    Future Appointments   Date Time Provider Alivia Zepeda   6/11/2021  1:00 PM [facility] Isabel[facility]   6/23/2021  1:45 PM Shay Espinal, PT [facility]   6/25/2021  1:00 PM Sachin Arreola, PT [facility]   6/29/2021  1:00 PM Anitra Leblanc, PT [facility]   9/8/2021  8:00 AM Pratima Wood MD [facility]

## 2021-06-11 ENCOUNTER — APPOINTMENT (OUTPATIENT)
Dept: PHYSICAL THERAPY | Age: 61
End: 2021-06-11
Payer: MEDICARE

## 2021-06-15 ENCOUNTER — APPOINTMENT (OUTPATIENT)
Dept: PHYSICAL THERAPY | Age: 61
End: 2021-06-15
Payer: MEDICARE

## 2021-06-23 ENCOUNTER — HOSPITAL ENCOUNTER (OUTPATIENT)
Dept: PHYSICAL THERAPY | Age: 61
Discharge: HOME | End: 2021-06-23
Payer: MEDICARE

## 2021-06-23 PROCEDURE — 97530 THERAPEUTIC ACTIVITIES: CPT

## 2021-06-23 PROCEDURE — 97110 THERAPEUTIC EXERCISES: CPT

## 2021-06-23 PROCEDURE — 97016 VASOPNEUMATIC DEVICE THERAPY: CPT

## 2021-06-23 NOTE — PROGRESS NOTES
PT DAILY TREATMENT NOTE - [facility]     Patient Name: David Xie  Date:2021  : 1960  [x]  Patient  Verified  [place]or: [place] MED[place] / Plan: [place] MEDICARE PART A & B / Product Type: Medicare /    In time:1355  Out time:1450  Total Treatment Time (min): 55  Total Timed Codes (min): 45   1:1 Treatment Time ( [address] only): 39   Visit #:     Treatment Area: Left knee pain [M25.562]    SUBJECTIVE  Pain Level (0-10 scale): 3  Any medication changes, allergies to medications, adverse drug reactions, diagnosis change, or new procedure performed?: [x] No    [] Yes (see summary sheet for update)  Subjective functional status/changes:   [] No changes reported    Patient reports that she returns home from trip out of town. OBJECTIVE    Modalities Rationale:     decrease edema and decrease pain to improve patient's ability to Complete ADLS              10 min [x]? ??  Vasopneumatic Device, press/temp: Low, low     min []? ??  Other:     [x]? ?? Skin assessment post-treatment (if applicable):    [x]? ??  intact    []? ??  redness- no adverse reaction     []? ??redness  adverse reaction:         35 min Therapeutic Exercise:  [x] See flow sheet :   Rationale: increase ROM, increase strength and decrease pain to improve the patients ability to complete ADLs    10 min Therapeutic Activity:  [x]  See flow sheet :   Rationale: increase ROM, increase strength and improve coordination  to improve the patients ability to complete ADLs          With   [x] TE   [] TA   [] neuro   [] other: Patient Education: [x] Review HEP    [] Progressed/Changed HEP based on:   [] positioning   [] body mechanics   [] transfers   [] heat/ice application    [] other:      Other Objective/Functional Measures: NA     Pain Level (0-10 scale) post treatment: 0    ASSESSMENT/Changes in Function: Patient responds well to treatment session. Patient returns after 2 week time away from treatment area.  She demonstrates gross reduction in knee extension. Advised patient to focus on extension exercises during HEP activities. She was agreeable. No adverse effects were noted from today's treatment session    Patient will continue to benefit from skilled PT services to modify and progress therapeutic interventions, address functional mobility deficits, address ROM deficits, address strength deficits, analyze and address soft tissue restrictions, analyze and cue movement patterns, analyze and modify body mechanics/ergonomics, assess and modify postural abnormalities, address imbalance and instruct in home and community integration to attain remaining goals. []  See Plan of Care  []  See progress note/recertification  []  See Discharge Summary         Progress towards goals / Updated goals:  Short Term Goals: To be accomplished in 4 weeks:                   Patient will report compliance with HEP at least 1x/day to aid in rehabilitation program.                   Status at IE: Patient instructed in and provided written copy of initial Home Exercise Program.                   Current: Patient reports continuing to perform full HEP 1-2x day ,  and heel slide with strap 3-5 times per day.  6/3/2021 Met                      Patient will demonstrate left knee AROM of 0 to 120 degrees to aid in completion of ADLs.                  Status at IE: extension +12 degrees from neutral, flexion 92 degrees.                  Current: In-progress, PROM 7-112 degrees.      6/23/2021     Long Term Goals: To be accomplished in 8 weeqks:                   Patient will increase strength to 5/5 throughout B LEs to aid in completion of ADLs.                  Status at IE: left knee flex 3+, ext 3+                   Current: progressing, left knee flexion 3+/5. Ext: 4/5 (5/28/21)                      Patient will report pain less than 1-2/10 average to aid in completion of ADLs.                    Status at IE: 2-9/10                   Current: Pain ranges from 0-5/10 ( 5 only if she pushes it and does too much ) most days only up to 2-3/10 , progressing 6/3/2021                       Patient will ambulate 1000 ft without device with normal gait pattern to maximize independence and safety in mobility                     Status at IE: household distances with rw with antalgic gait pattern.                   Current: walking with cane consistently (needs cues for gait pattern tends to hold left leg stiff  + limp )  , able to walk for couple hours with cane per pt , progressing 6/3/2021                       Patient will improve FOTO (an established functional score where 100 = no disability) to 46 points overall to demonstrate improvement in functional ability.                    Status at IE:25                 Current: 42/100 progressing 6/3/2021     PLAN  []  Upgrade activities as tolerated     [x]  Continue plan of care  []  Update interventions per flow sheet       []  Discharge due to:_  []  Other:_      Brittany Wren, PT, DPT 6/23/2021  2:28 PM    Future Appointments   Date Time Provider Alivia Katelyn   6/25/2021  1:00 PM Lion Santillan THE [facility]   6/29/2021  1:00 PM Akin Damon, PT [facility] THE [facility]   9/8/2021  8:00 AM Bailee Willett MD [facility]

## 2021-06-25 ENCOUNTER — HOSPITAL ENCOUNTER (OUTPATIENT)
Dept: PHYSICAL THERAPY | Age: 61
Discharge: HOME | End: 2021-06-25
Payer: MEDICARE

## 2021-06-25 PROCEDURE — 97110 THERAPEUTIC EXERCISES: CPT

## 2021-06-25 PROCEDURE — 97530 THERAPEUTIC ACTIVITIES: CPT

## 2021-06-25 PROCEDURE — 97016 VASOPNEUMATIC DEVICE THERAPY: CPT

## 2021-06-25 NOTE — PROGRESS NOTES
PT DAILY TREATMENT NOTE    Patient Name: Maria Dolores Arriaga  Date:2021  : 1960  [x]  Patient  Verified  Payor: [place] MEDICARE / Plan: [place] MEDICARE PART A & B / Product Type: Medicare /    In time: 104  Out time: 203  Total Treatment Time (min): 59  Total Timed Codes (min): 59  1:1 Treatment Time ([facility] only): 40   Visit #: 12 of 16    Treatment Area: Left knee pain [M25.562]    SUBJECTIVE  Pain Level (0-10 scale): 2-3  Any medication changes, allergies to medications, adverse drug reactions, diagnosis change, or new procedure performed?: [x] No    [] Yes (see summary sheet for update)  Subjective functional status/changes:   [] No changes reported  \"I think the knee stiffened up some while I was away on vacation. \"    OBJECTIVE    Modalities Rationale:     decrease edema, decrease inflammation and decrease pain to improve patient's ability to ambulate with normal gait pattern. min []  Ice     []  Heat    location/position:     min [x]  Vasopneumatic Device, press/temp: Low, low   If using vaso (only need to measure limb vaso being performed on)      pre-treatment girth : 49.8 cm      post-treatment girth : 47.8 cm      measured at (landmark location) : mid-patella     min []  Other:    [x] Skin assessment post-treatment (if applicable):    [x]  intact    []  redness- no adverse reaction                  []redness  adverse reaction:        30 min Therapeutic Exercise:  [x] See flow sheet :   Rationale: increase ROM, increase strength and decrease pain to improve the patients ability to complete ADLs  ambulation safety and efficiency in order to improve patient's ability to safely ambulate at home for self care.         14 min Therapeutic Activity:  [x]  See flow sheet :   Rationale: increase ROM, increase strength and improve coordination  to improve the patients ability to Complete ADLS     With   [] TE   [] TA   [] neuro   [] other: Patient Education: [x] Review HEP    [] Progressed/Changed HEP based on: [] positioning   [] body mechanics   [] transfers   [] heat/ice application    [] other:      Other Objective/Functional Measures: NA     Pain Level (0-10 scale) post treatment: 0    ASSESSMENT/Changes in Function: Patient steadily improving in functional mobility but still struggling to gain full AROM. Patient responds well to treatment session. No adverse effects were noted from today's treatment session. Patient will continue to benefit from skilled PT services to modify and progress therapeutic interventions, address functional mobility deficits, address ROM deficits, address strength deficits, analyze and address soft tissue restrictions, analyze and cue movement patterns, analyze and modify body mechanics/ergonomics, assess and modify postural abnormalities,  and instruct in home and community integration to attain remaining goals. []  See Plan of Care  []  See progress note/recertification  []  See Discharge Summary         Progress towards goals / Updated goals:  Short Term Goals: To be accomplished in 4 weeks:                   Patient will report compliance with HEP at least 1x/day to aid in rehabilitation program.                   Status at IE: Patient instructed in and provided written copy of initial Home Exercise Program.                   Current: Patient reports continuing to perform full HEP 1-2x day ,  and heel slide with strap 3-5 times per day.  6/3/2021 Met                      Patient will demonstrate left knee AROM of 0 to 120 degrees to aid in completion of ADLs.                  Status at IE: extension +12 degrees from neutral, flexion 92 degrees.                  Current: In-progress, PROM 7-112 degrees.      6/23/2021     Long Term Goals: To be accomplished in 8 weeqks:                   Patient will increase strength to 5/5 throughout B LEs to aid in completion of ADLs.                    Status at IE: left knee flex 3+, ext 3+                   Current: progressing, left knee flexion 3+/5. Ext: 4/5 (5/28/21)                      Patient will report pain less than 1-2/10 average to aid in completion of ADLs.                  Status at IE: 2-9/10                   Current: Pain ranges from 0-5/10 ( 5 only if she pushes it and does too much ) most days only up to 2-3/10 , progressing 6/3/2021                       Patient will ambulate 1000 ft without device with normal gait pattern to maximize independence and safety in mobility                     Status at IE: household distances with rw with antalgic gait pattern.                   Current: walking with cane consistently (needs cues for gait pattern tends to hold left leg stiff  + limp )  , able to walk for couple hours with cane per pt , progressing 6/3/2021                       Patient will improve FOTO (an established functional score where 100 = no disability) to 46 points overall to demonstrate improvement in functional ability.                    Status at IE:25                 Current: 42/100 progressing 6/3/2021     PLAN  []  Upgrade activities as tolerated     [x]  Continue plan of care  []  Update interventions per flow sheet       []  Discharge due to:_  []  Other:_      Anne Simms, PT, DPT 6/25/2021  1:12 PM    Future Appointments   Date Time Provider Alivia Zepeda   6/29/2021  1:00 PM Jeimy Moss, PT [facility]   9/8/2021  8:00 AM Juan Mauricio MD [facility] AM[facility]

## 2021-06-29 ENCOUNTER — HOSPITAL ENCOUNTER (OUTPATIENT)
Dept: PHYSICAL THERAPY | Age: 61
Discharge: HOME | End: 2021-06-29
Payer: MEDICARE

## 2021-06-29 PROCEDURE — 97530 THERAPEUTIC ACTIVITIES: CPT

## 2021-06-29 PROCEDURE — 97016 VASOPNEUMATIC DEVICE THERAPY: CPT

## 2021-06-29 PROCEDURE — 97110 THERAPEUTIC EXERCISES: CPT

## 2021-06-29 NOTE — PROGRESS NOTES
PT DAILY TREATMENT NOTE - [facility]     Patient Name: Jay Colby  Date:2021  : 1960  [x]  Patient  Verified  Payor: [place] MEDICARE / Plan: [place] MEDICARE PART A & B / Product Type: Medicare /    In time:1300  Out time:1355  Total Treatment Time (min): 55  Total Timed Codes (min): 45   1:1 Treatment Time ([facility] only): 39   Visit #: 13 of 16    Treatment Area: Left knee pain [M25.562]    SUBJECTIVE  Pain Level (0-10 scale): 1  Any medication changes, allergies to medications, adverse drug reactions, diagnosis change, or new procedure performed?: [x] No    [] Yes (see summary sheet for update)  Subjective functional status/changes:   [] No changes reported    Patient reports that she has no new complaints. She states that she had follow up with surgeon and was recommended more physical therapy. She states that the surgeon feels that the lack of knee extension is secondary to prior surgical procedures resulting in increased scar formation. OBJECTIVE    Modalities Rationale:     decrease edema, decrease inflammation and decrease pain to improve patient's ability to ambulate with normal gait pattern.            min []? Ice     []? Heat    location/position:     10  min [x]? Vasopneumatic Device, press/temp: Low, low   If using vaso (only need to measure limb vaso being performed on)      pre-treatment girth : 48.5 cm      post-treatment girth : 48.0 cm      measured at (landmark location) : mid-patella      min []? Other:     [x]? Skin assessment post-treatment (if applicable):    [x]? intact    []?   redness- no adverse reaction                  []?redness  adverse reaction:        35 min Therapeutic Exercise:  [x] See flow sheet :   Rationale: increase ROM, increase strength and decrease pain to improve the patients ability to complete ADLs    10 min Therapeutic Activity:  [x]  See flow sheet :   Rationale: increase ROM, increase strength and improve coordination  to improve the patients ability to complete ADLs           Wth   [x] TE   [] TA   [] neuro   [] other: Patient Education: [x] Review HEP    [] Progressed/Changed HEP based on:   [] positioning   [] body mechanics   [] transfers   [] heat/ice application    [] other:      Other Objective/Functional Measures: NA     Pain Level (0-10 scale) post treatment: 0    ASSESSMENT/Changes in Function: Patient responds well to treatment session. Patient requires steady cues to focus on perform knee extension moment when performing step ups. She demonstrates decreased knee extension ROM. Encouraged patient to continue to focus on knee ROM during HEP activities. She demonstrated understanding. No adverse effects were noted from today's treatment session    Patient will continue to benefit from skilled PT services to modify and progress therapeutic interventions, address functional mobility deficits, address ROM deficits, address strength deficits, analyze and address soft tissue restrictions, analyze and cue movement patterns, analyze and modify body mechanics/ergonomics, assess and modify postural abnormalities, address imbalance and instruct in home and community integration to attain remaining goals. []  See Plan of Care  []  See progress note/recertification  []  See Discharge Summary         Progress towards goals / Updated goals:  Short Term Goals: To be accomplished in 4 weeks:                   Patient will report compliance with HEP at least 1x/day to aid in rehabilitation program.                   Status at IE: Patient instructed in and provided written copy of initial Home Exercise Program.                   Current: Patient reports continuing to perform full HEP 1-2x day ,  and heel slide with strap 3-5 times per day.  6/3/2021 Met                      Patient will demonstrate left knee AROM of 0 to 120 degrees to aid in completion of ADLs.                    Status at IE: extension +12 degrees from neutral, flexion 92 degrees.                  Current: In-progress, PROM 7-112 degrees. Reviewed knee ROM HEP activities      6/29/2021     Long Term Goals: To be accomplished in 8 weeqks:                   Patient will increase strength to 5/5 throughout B LEs to aid in completion of ADLs.                  Status at IE: left knee flex 3+, ext 3+                   Current: progressing, left knee flexion 3+/5. Ext: 4/5 (5/28/21)                      Patient will report pain less than 1-2/10 average to aid in completion of ADLs.                  Status at IE: 2-9/10                   Current: Pain ranges from 0-5/10 ( 5 only if she pushes it and does too much ) most days only up to 2-3/10 , progressing 6/3/2021                       Patient will ambulate 1000 ft without device with normal gait pattern to maximize independence and safety in mobility                     Status at IE: household distances with rw with antalgic gait pattern.                   Current: walking with cane consistently (needs cues for gait pattern tends to hold left leg stiff  + limp )  , able to walk for couple hours with cane per pt , progressing 6/3/2021                       Patient will improve FOTO (an established functional score where 100 = no disability) to 46 points overall to demonstrate improvement in functional ability.                    Status at IE:25                 Current: 42/100 progressing 6/3/2021     PLAN  []  Upgrade activities as tolerated     [x]  Continue plan of care  []  Update interventions per flow sheet       []  Discharge due to:_  []  Other:_      Josie Davenport, PT, DPT 6/29/2021  1:03 PM    Future Appointments   Date Time Provider Alivia Zepeda   7/1/2021  8:00 AM Lion Childs THE [facility]   7/6/2021  1:45 PM Lawson Caruso, PT [facility] THE [facility]   7/8/2021  3:15 PM Steffany Swartz, MARÍA GIRON THE [facility]   9/8/2021  8:00 AM Vinh Mckeon MD [facility] AMB

## 2021-07-01 ENCOUNTER — HOSPITAL ENCOUNTER (OUTPATIENT)
Dept: PHYSICAL THERAPY | Age: 61
Discharge: HOME | End: 2021-07-01
Payer: MEDICARE

## 2021-07-01 PROCEDURE — 97110 THERAPEUTIC EXERCISES: CPT

## 2021-07-01 PROCEDURE — 97530 THERAPEUTIC ACTIVITIES: CPT

## 2021-07-01 PROCEDURE — 97016 VASOPNEUMATIC DEVICE THERAPY: CPT

## 2021-07-01 NOTE — PROGRESS NOTES
[facility] at [address] [place] [address] Drive: [phone]   Fax: [phone]  Progress Note  Patient Name: Dinah Padilla : 1960   Medical   Diagnosis: Left knee pain [M25.562] Treatment Diagnosis: Left knee pain   Onset Date: 2021     Referral Source: Sergio Pollock MD [facility]): 2021   Prior Hospitalization: See medical history Provider #: [id number]   Prior Level of Function: Walk two miles per day, bicycling   Comorbidities: MS, OA, depression, hypothyroid, GERD, right partial TKA   Medications: Verified on Patient Summary List      ===========================================================================================  Assessment / Summary of Care:  Dinah Padilla is a 63 y.o.  female who is  noting improving functional activity tolerance with minimal pain. Strength is improving but patient still struggles with progressing ROM. Patient is nearing full independence with comprehensive HEP and self care instructions and will soon be ready for discharge from PT and transition to independent HEP and self care, estimate one more week of PT then discharge. Patient responds well to treatment session. No adverse effects were noted from today's treatment session.      Patient will continue to benefit from skilled PT services to modify and progress therapeutic interventions, address functional mobility deficits, address ROM deficits, address strength deficits, analyze and address soft tissue restrictions, analyze and cue movement patterns, analyze and modify body mechanics/ergonomics, assess and modify postural abnormalities,  and instruct in home and community integration to attain remaining goals.     ===========================================================================================    Plan:Continue therapy per initial plan/protocol at a frequency of  2 x per week for 1 week    Progress Towards Goals:   Short Term Goals: To be accomplished in 4 weeks:                   Patient will report compliance with HEP at least 1x/day to aid in rehabilitation program.                   Status at IE: Patient instructed in and provided written copy of initial Home Exercise Program.                   Current: Patient nearing independence with comprehensive HEP and self care instructions. .  7/1/2021                      Patient will demonstrate left knee AROM of 0 to 120 degrees to aid in completion of ADLs.                  Status at IE: extension +12 degrees from neutral, flexion 92 degrees.                  Current: In-progress, PROM 6-112 degrees. Reviewed knee ROM HEP activities      7/1/2021     Long Term Goals: To be accomplished in 8 weeqks:                   MGEIYEF will increase strength to 5/5 throughout B LEs to aid in completion of ADLs.                  Status at IE: left knee flex 3+, ext 3+                   Current: left knee flex 4/5, ext 4+/5.    7/1/2021                      Patient will report pain less than 1-2/10 average to aid in completion of ADLs.                  Status at IE: 2-9/10                   Current: Pain now more stable in 1-3/10 range. 7/1/2021                      Patient will ambulate 1000 ft without device with normal gait pattern to maximize independence and safety in mobility                     Status at IE: household distances with rw with antalgic gait pattern.                   Current: Now able to ambulate community distances without cane, but still with altered gait pattern. 7/1/2021                      Patient will improve FOTO (an established functional score where 100 = no disability) to 46 points overall to demonstrate improvement in functional ability.                    Status at IE:25                   Current: 42/100 progressing 6/3/2021       ===========================================================================================  Subjective: \"I walked a lot yesterday, without the cane, and I didn't have much pain at all. I am getting better with the exercises at home and I think I will be ready to go on my own soon. \"        Therapist Signature: Dai Ross PT, DPT Date: 6/1/3990   Re-Certification: NA Time: 8:54 AM         [facility] at [facility], [address] [place] [address]                    Phone: [phone]   Fax: [phone]  .

## 2021-07-01 NOTE — PROGRESS NOTES
PT DAILY TREATMENT NOTE    Patient Name: Haresh June  OLFO:[id number]  : 1960  [x]  Patient  Verified  Payor: [place] MED[place]ARE / Plan: [place] MED[place]ARE PART A & B / Product Type: Medicare /    In time: 844  Out time: 857  Total Treatment Time (min): 59  Total Timed Codes (min): 49  1:1 Treatment Time ( only): 40   Visit #: 14 of 16    Treatment Area: Left knee pain [M25.562]    SUBJECTIVE  Pain Level (0-10 scale): 1  Any medication changes, allergies to medications, adverse drug reactions, diagnosis change, or new procedure performed?: [x] No    [] Yes (see summary sheet for update)  Subjective functional status/changes:   [] No changes reported  \"I walked a lot yesterday, without the cane, and I didn't have much pain at all. \"    OBJECTIVE    Modalities Rationale:     decrease edema, decrease inflammation and decrease pain to improve patient's ability to ambulate with normal gait pattern.            min []? Ice     []? Heat    location/position:       min [x]? Vasopneumatic device, press/temp: Low, low   If using vaso (only need to measure limb vaso being performed on)      pre-treatment girth : 48.1 cm      post-treatment girth : 47.6 cm      measured at (landmark location) : mid-patella      min []? Other:     [x]? Skin assessment post-treatment (if applicable):    [x]? intact    []? redness- no adverse reaction                  []?redness  adverse reaction:        30 min Therapeutic Exercise:  [x] See flow sheet :   Rationale: increase ROM, increase strength and decrease pain to improve the patients ability to complete ADLs  ambulation safety and efficiency in order to improve patient's ability to safely ambulate at home for self care.         14 min Therapeutic Activity:  [x]  See flow sheet :   Rationale: increase ROM, increase strength and improve coordination  to improve the patients ability to Complete ADLS      With   [] TE   [] TA   [] neuro   [] other: Patient Education: [x] Review HEP    [] Progressed/Changed HEP based on:   [] positioning   [] body mechanics   [] transfers   [] heat/ice application    [] other:      Other Objective/Functional Measures: NA     Pain Level (0-10 scale) post treatment: 0    ASSESSMENT/Changes in Function: Patient noting improving functional activity tolerance with minimal pain. Strength is improving but patient still struggles with progressing ROM. Patient is nearing full independence with comprehensive HEP and self care instructions and will soon be ready for discharge from PT and transition to independent HEP and self care, estimate one more week of PT then discharge. Patient responds well to treatment session. No adverse effects were noted from today's treatment session. Patient will continue to benefit from skilled PT services to modify and progress therapeutic interventions, address functional mobility deficits, address ROM deficits, address strength deficits, analyze and address soft tissue restrictions, analyze and cue movement patterns, analyze and modify body mechanics/ergonomics, assess and modify postural abnormalities,  and instruct in home and community integration to attain remaining goals. []  See Plan of Care  [x]  See progress note/recertification  []  See Discharge Summary         Progress towards goals / Updated goals:  Short Term Goals: To be accomplished in 4 weeks:                   Patient will report compliance with HEP at least 1x/day to aid in rehabilitation program.                   Status at IE: Patient instructed in and provided written copy of initial Home Exercise Program.                   Current: Patient nearing independence with comprehensive HEP and self care instructions. .  7/1/2021                      Patient will demonstrate left knee AROM of 0 to 120 degrees to aid in completion of ADLs.                    Status at IE: extension +12 degrees from neutral, flexion 92 degrees.                  Current: In-progress, PROM 6-112 degrees. Reviewed knee ROM HEP activities      7/1/2021     Long Term Goals: To be accomplished in 8 weeqks:                   AFVFPDN will increase strength to 5/5 throughout B LEs to aid in completion of ADLs.                  Status at IE: left knee flex 3+, ext 3+                   Current: left knee flex 4/5, ext 4+/5.    7/1/2021                      Patient will report pain less than 1-2/10 average to aid in completion of ADLs.                  Status at IE: 2-9/10                   Current: Pain now more stable in 1-3/10 range. 7/1/2021                      Patient will ambulate 1000 ft without device with normal gait pattern to maximize independence and safety in mobility                     Status at IE: household distances with rw with antalgic gait pattern.                   Current: Now able to ambulate community distances without cane, but still with altered gait pattern. 7/1/2021                      Patient will improve FOTO (an established functional score where 100 = no disability) to 46 points overall to demonstrate improvement in functional ability.                    Status at IE:25                   Current: 42/100 progressing 6/3/2021     PLAN  []  Upgrade activities as tolerated     [x]  Continue plan of care  []  Update interventions per flow sheet       []  Discharge due to:_  []  Other:_      Jorge Perdomo PT, DPT 7/1/2021  8:00 AM    Future Appointments   Date Time Provider Alivia Zepeda   7/6/2021  1:45 PM Renetta Caruso, MARÍA GIRON [facility]   7/8/2021  3:15 PM Loreli Runner, PT [facility]   9/8/2021  8:00 AM Jose Schulz MD [facility]

## 2021-07-06 ENCOUNTER — HOSPITAL ENCOUNTER (OUTPATIENT)
Dept: PHYSICAL THERAPY | Age: 61
Discharge: HOME | End: 2021-07-06
Payer: MEDICARE

## 2021-07-06 PROCEDURE — 97016 VASOPNEUMATIC DEVICE THERAPY: CPT

## 2021-07-06 PROCEDURE — 97110 THERAPEUTIC EXERCISES: CPT

## 2021-07-06 PROCEDURE — 97530 THERAPEUTIC ACTIVITIES: CPT

## 2021-07-06 NOTE — PROGRESS NOTES
PHYSICAL THERAPY DAILY TREATMENT NOTE    Patient Name: Georgia Brambila  Date:2021  : 1960  [x]  Patient  Verified  Payor: [place] MEDICARE / Plan: [place] MEDICARE PART A & B / Product Type: Medicare /    In Time: 1:45  Out Time: 2:40  Total Treatment Time (min):     55  Total Timed Codes (min):         45  1:1 Treatment Time ( only): 40   Visit #: 15/16    Treatment Area: Left knee pain [M25.562]    SUBJECTIVE  Pre-Treatment Pain Level (0-10 scale): 3/10 left knee; 6/10 right elbow  Subjective Functional Status/Changes: \"I did a lot of walking this weekend, more than my usual.  And I forgot to use my cane during that, so I'm hurting a lot more than usual today. My right elbow also hurts a lot, I think I might need to actually go to the doctor for that soon. \"    Any medication changes, allergies to medications, adverse drug reactions, diagnosis change, or new procedure performed?: [x] No    [] Yes (see summary sheet for update)    OBJECTIVE    28 min Therapeutic Exercise:  [x] See flow sheet   Rationale: increase ROM, increase strength, and decrease pain to improve the patients ability to perform ADLs    12 min Therapeutic Activity:  [x] See flow sheet   Rationale: increase ROM, increase strength, and improve coordination to improve the patients ability to perform ADLs      10 min Modalities:      [x]  Vasopneumatic Device: temp 48 deg F         [x] low pressure    [] medium pressure          - Pre-Vaso Girth (cm):  48cm          - Post-Vaso Girth (cm): 47.7cm          - Girth Measurement Location: mid-patella              []  Ice Pack - no charge      []  Moist Heat Pack - no charge      []  Electrical Stimulation         [] IFC     [] Pre-Mod     [] NMES         - Estim Location:       []  Other:    Rationale: to decrease edema and decrease pain to improve patient's ability to perform ADLs and improve QOL    With   [x] TE   [] TA   [] neuro   [] other: Patient Education: [x] Review HEP    [] Progressed/Changed HEP based on:   [] positioning   [] body mechanics   [] transfers   [] heat/ice application    [] other: verbal and/or tactile cueing prn to improve performance/body alignment during therapeutic exercises       Other Objective/Functional Measures: n/a    Post-Treatment Pain Level (0-10 scale): 5/10 left knee, 6/10 right elbow    [x]  See Plan of Care  []  See Progress Note/Recertification  []  See Discharge Summary    ASSESSMENT  Patient responded well to today's treatment without any adverse reactions. Patient with complaints of increased pain upon her arrival today, which limited her ability to perform exercises today. Had to decrease weight during SLR's, sit to stands, and also had to continue using a peach resistance band during 3 way hip exercises rather than progressing to higher weights/resistance. Continue with plan to discharge to [facility] next visit. Patient will continue to benefit from skilled PT services to modify and progress therapeutic interventions, address functional mobility deficits, address ROM deficits, address strength deficits, analyze and address soft tissue restrictions, analyze and cue movement patterns, analyze and modify body mechanics/ergonomics, assess and modify postural abnormalities, and instruct in home and community integration to attain remaining goals. Progress Towards Goals/Updated Goals:  Short Term Goals: To be accomplished in 4 weeks:                   XFVOUOQ will report compliance with HEP at least 1x/day to aid in rehabilitation program.                   Status at IE: Patient instructed in and provided written copy of initial Home Exercise Program.                   Current: patient reports independence with HEP 1x/day 7/6/21 met                      Patient will demonstrate left knee AROM of 0 to 120 degrees to aid in completion of ADLs.                    Status at IE: extension +12 degrees from neutral, flexion 92 degrees.                  Current: In-progress, PROM 6-112 degrees. Reviewed knee ROM HEP activities      7/1/2021     Long Term Goals: To be accomplished in 8 weeqks:                   Patient will increase strength to 5/5 throughout B LEs to aid in completion of ADLs.                  Status at IE: left knee flex 3+, ext 3+                   Current: left knee flex 4/5, ext 4+/5.    7/1/2021                      Patient will report pain less than 1-2/10 average to aid in completion of ADLs.                  Status at IE: 2-9/10                   Current: Pain now more stable in 1-3/10 range. 7/1/2021                      Patient will ambulate 1000 ft without device with normal gait pattern to maximize independence and safety in mobility                     Status at IE: household distances with rw with antalgic gait pattern.                   Current: Now able to ambulate community distances without cane, but still with altered gait pattern. 7/1/2021                      Patient will improve FOTO (an established functional score where 100 = no disability) to 46 points overall to demonstrate improvement in functional ability.                  Status at IE:25                   Current: 42/100 progressing 6/3/2021     PLAN  [x]  Continue per Plan of Care  []  Upgrade activities as tolerated       []  Update interventions per flow sheet       []  Discharge due to: _  []  Other: _      Lawson Dale.  MARÍA Caruso, DPT, ATR         7/6/2021     9:31 AM    Future Appointments   Date Time Provider Alivia Zepeda   7/6/2021  1:45 PM Lawson Caruso PT [facility]   7/8/2021  3:15 PM MARÍA Harvey [facility]   9/8/2021  8:00 AM Vinh Mckeon MD [facility]

## 2021-07-08 ENCOUNTER — HOSPITAL ENCOUNTER (OUTPATIENT)
Dept: PHYSICAL THERAPY | Age: 61
Discharge: HOME | End: 2021-07-08
Payer: MEDICARE

## 2021-07-08 PROCEDURE — 97530 THERAPEUTIC ACTIVITIES: CPT

## 2021-07-08 PROCEDURE — 97110 THERAPEUTIC EXERCISES: CPT

## 2021-07-08 PROCEDURE — 97016 VASOPNEUMATIC DEVICE THERAPY: CPT

## 2021-07-08 NOTE — PROGRESS NOTES
[facility] at [address] [place] [address] Drive: [phone]   Fax: [phone]  Discharge Summary  Patient Name: Rupa Haynes : 1960   Medical   Diagnosis: Left knee pain [M25.562] Treatment Diagnosis: Left knee pain   Onset Date: 2021       Referral Source: Federica Huitron MD [facility]): 2021   Prior Hospitalization: See medical history Provider #: [id number]   Prior Level of Function: Walk two miles per day, bicycling   Comorbidities: MS, OA, depression, hypothyroid, GERD, right partial TKA   Medications: Verified on Patient Summary List        ===========================================================================================  Assessment / Summary of Care:  Rupa Haynes is a 63 y.o.   female who has been well motivated, consistent and diligent with PT and has made moderate progress towards goals and is now ready to dc PT and transition to independent self care. Patient has been provided written instructions for all exercises and how to progress exercises over time. .   ===========================================================================================    Plan: Discharge to independent HEP. Progress Towards Goals:     Short Term Goals: To be accomplished in 4 weeks:                   RYMLGKH will report compliance with HEP at least 1x/day to aid in rehabilitation program.                   Status at IE: Patient instructed in and provided written copy of initial Home Exercise Program.                   Current: patient reports independence with HEP 1x/day 21 met                      Patient will demonstrate left knee AROM of 0 to 120 degrees to aid in completion of ADLs.                  Status at IE: extension +12 degrees from neutral, flexion 92 degrees.                    Current: In-progress, PROM 6-112 degrees. Reviewed knee ROM HEP activities      2021     Long Term Goals: To be accomplished in 8 weeqks:                   SLBTBNL will increase strength to 5/5 throughout B LEs to aid in completion of ADLs.                  Status at IE: left knee flex 3+, ext 3+                   Current: left knee flex 4/5, ext 4+/5.    7/1/2021                      Patient will report pain less than 1-2/10 average to aid in completion of ADLs.                  Status at IE: 2-9/10                   Current: Pain now more stable in 1-3/10 range.   7/1/2021                      Patient will ambulate 1000 ft without device with normal gait pattern to maximize independence and safety in mobility                     Status at IE: household distances with rw with antalgic gait pattern.                   Current: Now able to ambulate community distances without cane, but still with altered gait pattern.    7/1/2021                      Patient will improve FOTO (an established functional score where 100 = no disability) to 46 points overall to demonstrate improvement in functional ability.                    Status at IE:25                   Current: 54/100     7/8//2021 MET      ===========================================================================================    Therapist Signature: Ceasar Tim PT, DPT Date: 7/8/2021     Time: 4:13 PM

## 2021-07-08 NOTE — PROGRESS NOTES
.Physical Therapy Discharge Instructions    [facility] at [address] [place] [address]  Phone: [phone]   Fax: [phone]      Patient: Sherlyn Leija  : 1960    Continue Home Exercise Program 1-3 times per day for 6 weeks, then decrease to 3 times per week      Continue with    [x] Ice  as needed      [] Heat           Follow up with MD:     [] Upon completion of therapy     [x] As needed      Recommendations:     [x]   Return to activity with home program    []   Return to activity with the following modifications:       []Post Rehab Program    []Join Independent aquatic program     []Return to/join local gym      Additional Comments: Please contact clinic at above phone number if you have any questions regarding Home Exercise Program or self care instructions.       Amanuel Melton, PT 2021 3:49 PM

## 2021-07-08 NOTE — PROGRESS NOTES
PT DAILY TREATMENT NOTE    Patient Name: Senia Stearns  Date:2021  : 1960  [x]  Patient  Verified  Payor: Severiano Quinton / Plan: [place] MEDICARE PART A & B / Product Type: Medicare /    In time: 315  Out time: 359  Total Treatment Time (min): 44  Total Timed Codes (min): 34  1:1 Treatment Time ( only): 34   Visit #: 16 of 16    Treatment Area: Left knee pain [M25.562]    SUBJECTIVE  Pain Level (0-10 scale): 2  Any medication changes, allergies to medications, adverse drug reactions, diagnosis change, or new procedure performed?: [x] No    [] Yes (see summary sheet for update)  Subjective functional status/changes:   [] No changes reported  \"I think I am having a flare up of MS and I don't feel very good past few days. The knee is doing fine and I think I am ready to continue on my own. \"    OBJECTIVE    19 min Therapeutic Exercise:  [x] See flow sheet :   Rationale: increase ROM, increase strength and decrease pain to improve the patients ability to complete ADLs  ambulation safety and efficiency in order to improve patient's ability to safely ambulate at home for self care. 15 min Therapeutic Activity:  [x]  See flow sheet :   Rationale: increase ROM, increase strength and improve coordination  to improve the patients ability to Complete ADLS             10 min Modalities:         [x]? Vasopneumatic Device: temp 48 deg F         [x]? low pressure    []? medium pressure          - Pre-Vaso Girth (cm):  48.0cm          - Post-Vaso Girth (cm): 46.8cm          - Girth Measurement Location: mid-patella                     []? Ice Pack - no charge         []? Moist Heat Pack - no charge         []? Electrical Stimulation         []? IFC     []? Pre-Mod     []? NMES         - Estim Location:          []?   Other:     Rationale: to decrease edema and decrease pain to improve patient's ability to perform ADLs and improve QOL    With   [] TE   [] TA   [] neuro   [] other: Patient Education: [x] Review HEP    [] Progressed/Changed HEP based on:   [] positioning   [] body mechanics   [] transfers   [] heat/ice application    [] other:      Other Objective/Functional Measures: NA     Pain Level (0-10 scale) post treatment: 2    ASSESSMENT/Changes in Function: Patient has been well motivated, consistent and diligent with PT and has made moderate progress towards goals and is now ready to dc PT and transition to independent self care. Patient has been provided written instructions for all exercises and how to progress exercises over time. . Patient responds well to treatment session. No adverse effects were noted from today's treatment session. []  See Plan of Care  []  See progress note/recertification  [x]  See Discharge Summary         Progress towards goals / Updated goals:  Short Term Goals: To be accomplished in 4 weeks:                   Patient will report compliance with HEP at least 1x/day to aid in rehabilitation program.                   Status at IE: Patient instructed in and provided written copy of initial Home Exercise Program.                   Current: patient reports independence with HEP 1x/day 7/6/21 met                      Patient will demonstrate left knee AROM of 0 to 120 degrees to aid in completion of ADLs.                  Status at IE: extension +12 degrees from neutral, flexion 92 degrees.                  Current: In-progress, PROM 6-112 degrees. Reviewed knee ROM HEP activities      7/1/2021     Long Term Goals: To be accomplished in 8 weeqks:                   Patient will increase strength to 5/5 throughout B LEs to aid in completion of ADLs.                  Status at IE: left knee flex 3+, ext 3+                   Current: left knee flex 4/5, ext 4+/5.    7/1/2021                      Patient will report pain less than 1-2/10 average to aid in completion of ADLs.                  Status at IE: 2-9/10                   Current: Pain now more stable in 1-3/10 range.   7/1/2021                      Patient will ambulate 1000 ft without device with normal gait pattern to maximize independence and safety in mobility                     Status at IE: household distances with rw with antalgic gait pattern.                   Current: Now able to ambulate community distances without cane, but still with altered gait pattern.    7/1/2021                      Patient will improve FOTO (an established functional score where 100 = no disability) to 46 points overall to demonstrate improvement in functional ability.                  Status at IE:25                   Current: 54/100     7/8//2021 MET    PLAN  []  Upgrade activities as tolerated     [x]  Continue plan of care  []  Update interventions per flow sheet       [x]  Discharge due to:_ready to transition to independent self care.   []  Other:_      Melanie Rizzo, PT, DPT 7/8/2021  4:05 PM    Future Appointments   Date Time Provider Alivia Zepeda   9/8/2021  8:00 AM Alden Bartlett MD [facility]

## 2021-09-08 ENCOUNTER — HOSPITAL ENCOUNTER (OUTPATIENT)
Dept: PREADMISSION TESTING | Age: 61
Discharge: HOME | End: 2021-09-08
Payer: MEDICARE

## 2021-09-08 ENCOUNTER — TRANSCRIBE ORDER (OUTPATIENT)
Dept: REGISTRATION | Age: 61
End: 2021-09-08

## 2021-09-08 ENCOUNTER — OFFICE VISIT (OUTPATIENT)
Dept: SURGERY | Age: 61
End: 2021-09-08
Payer: MEDICARE

## 2021-09-08 VITALS
WEIGHT: 208.9 LBS | HEIGHT: 66 IN | DIASTOLIC BLOOD PRESSURE: 60 MMHG | SYSTOLIC BLOOD PRESSURE: 140 MMHG | BODY MASS INDEX: 33.57 KG/M2 | OXYGEN SATURATION: 98 % | HEART RATE: 79 BPM

## 2021-09-08 DIAGNOSIS — M24.662 ANKYLOSIS OF LEFT KNEE: Primary | ICD-10-CM

## 2021-09-08 DIAGNOSIS — K90.9 INTESTINAL MALABSORPTION, UNSPECIFIED TYPE: Primary | ICD-10-CM

## 2021-09-08 DIAGNOSIS — Z01.812 BLOOD TESTS PRIOR TO TREATMENT OR PROCEDURE: ICD-10-CM

## 2021-09-08 DIAGNOSIS — M24.662 ANKYLOSIS OF LEFT KNEE: ICD-10-CM

## 2021-09-08 LAB
ALBUMIN SERPL-MCNC: 3.1 G/DL (ref 3.4–5)
ALBUMIN/GLOB SERPL: 0.9 {RATIO} (ref 0.8–1.7)
ALP SERPL-CCNC: 68 U/L (ref 45–117)
ALT SERPL-CCNC: 14 U/L (ref 13–56)
ANION GAP SERPL CALC-SCNC: 6 MMOL/L (ref 3–18)
AST SERPL-CCNC: 13 U/L (ref 10–38)
ATRIAL RATE: 67 BPM
BASOPHILS # BLD: 0.1 K/UL (ref 0–0.1)
BASOPHILS NFR BLD: 1 % (ref 0–2)
BILIRUB SERPL-MCNC: 0.6 MG/DL (ref 0.2–1)
BUN SERPL-MCNC: 7 MG/DL (ref 7–18)
BUN/CREAT SERPL: 10 (ref 12–20)
CALCIUM SERPL-MCNC: 8.1 MG/DL (ref 8.5–10.1)
CALCULATED P AXIS, ECG09: 68 DEGREES
CALCULATED R AXIS, ECG10: 56 DEGREES
CALCULATED T AXIS, ECG11: 44 DEGREES
CHLORIDE SERPL-SCNC: 105 MMOL/L (ref 100–111)
CO2 SERPL-SCNC: 30 MMOL/L (ref 21–32)
CREAT SERPL-MCNC: 0.68 MG/DL (ref 0.6–1.3)
DIAGNOSIS, 93000: NORMAL
DIFFERENTIAL METHOD BLD: ABNORMAL
EOSINOPHIL # BLD: 0.4 K/UL (ref 0–0.4)
EOSINOPHIL NFR BLD: 6 % (ref 0–5)
ERYTHROCYTE [DISTWIDTH] IN BLOOD BY AUTOMATED COUNT: 17.7 % (ref 11.6–14.5)
GLOBULIN SER CALC-MCNC: 3.5 G/DL (ref 2–4)
GLUCOSE SERPL-MCNC: 91 MG/DL (ref 74–99)
HCT VFR BLD AUTO: 34.8 % (ref 35–45)
HGB BLD-MCNC: 11.4 G/DL (ref 12–16)
LYMPHOCYTES # BLD: 1.5 K/UL (ref 0.9–3.6)
LYMPHOCYTES NFR BLD: 22 % (ref 21–52)
MCH RBC QN AUTO: 24.4 PG (ref 24–34)
MCHC RBC AUTO-ENTMCNC: 32.8 G/DL (ref 31–37)
MCV RBC AUTO: 74.5 FL (ref 78–100)
MONOCYTES # BLD: 0.6 K/UL (ref 0.05–1.2)
MONOCYTES NFR BLD: 8 % (ref 3–10)
NEUTS SEG # BLD: 4.2 K/UL (ref 1.8–8)
NEUTS SEG NFR BLD: 63 % (ref 40–73)
P-R INTERVAL, ECG05: 150 MS
PLATELET # BLD AUTO: 269 K/UL (ref 135–420)
PMV BLD AUTO: 11 FL (ref 9.2–11.8)
POTASSIUM SERPL-SCNC: 3.9 MMOL/L (ref 3.5–5.5)
PROT SERPL-MCNC: 6.6 G/DL (ref 6.4–8.2)
Q-T INTERVAL, ECG07: 402 MS
QRS DURATION, ECG06: 84 MS
QTC CALCULATION (BEZET), ECG08: 424 MS
RBC # BLD AUTO: 4.67 M/UL (ref 4.2–5.3)
SODIUM SERPL-SCNC: 141 MMOL/L (ref 136–145)
VENTRICULAR RATE, ECG03: 67 BPM
WBC # BLD AUTO: 6.7 K/UL (ref 4.6–13.2)

## 2021-09-08 PROCEDURE — 80053 COMPREHEN METABOLIC PANEL: CPT

## 2021-09-08 PROCEDURE — 99214 OFFICE O/P EST MOD 30 MIN: CPT | Performed by: SPECIALIST

## 2021-09-08 PROCEDURE — G0463 HOSPITAL OUTPT CLINIC VISIT: HCPCS | Performed by: SPECIALIST

## 2021-09-08 PROCEDURE — 93005 ELECTROCARDIOGRAM TRACING: CPT

## 2021-09-08 PROCEDURE — G8417 CALC BMI ABV UP PARAM F/U: HCPCS | Performed by: SPECIALIST

## 2021-09-08 PROCEDURE — 36415 COLL VENOUS BLD VENIPUNCTURE: CPT

## 2021-09-08 PROCEDURE — 3017F COLORECTAL CA SCREEN DOC REV: CPT | Performed by: SPECIALIST

## 2021-09-08 PROCEDURE — G8427 DOCREV CUR MEDS BY ELIG CLIN: HCPCS | Performed by: SPECIALIST

## 2021-09-08 PROCEDURE — G8432 DEP SCR NOT DOC, RNG: HCPCS | Performed by: SPECIALIST

## 2021-09-08 PROCEDURE — 85025 COMPLETE CBC W/AUTO DIFF WBC: CPT

## 2021-09-08 RX ORDER — TELMISARTAN 20 MG/1
20 TABLET ORAL
COMMUNITY

## 2021-09-08 NOTE — PROGRESS NOTES
4 years follow-up    Subjective:     Alexandria Romero  is a 63 y.o. female who presents for follow-up about 4 years following laparoscopic sleeve gastrectomy. She has lost a total of 50 pounds since surgery. There is no height or weight on file to calculate BMI. . EBWL is (42%). The patient presents today to assess their progress toward their goal of weight loss and to address any issues that may be present. Today the patient and I have reviewed their diet and how appropriate their food choices are. The following issues have been identified - she has only lost 2 lbs over the past 7 months. Pain assessment - 0  . Surgery related complication: none       She reports no issues and denies vomiting and abdominal pain. The patient's exercise level: not active. Changes in her medical history and medications have been reviewed.     Patient Active Problem List   Diagnosis Code    Osteoarthritis of left knee M17.12    Osteoarthritis of right knee M17.11    Thyroid cancer ([address] C73    Rheumatoid arthritis ([address] M06.9    Asthma J45.909    Adverse effect of anesthesia T41.45XA    S/P gastric surgery Z98.890    Intestinal malabsorption K90.9     Past Medical History:   Diagnosis Date    Adverse effect of anesthesia     small airway, difficult intubation, pt requesting anesthesia consult    Asthma     seasonal; no inhaler    Borderline diabetes     Chronic pain     lower back, arms and legs    Depression     Diabetes ([address]     diet control and exercise    Difficult intubation     GERD (gastroesophageal reflux disease)     H/O seasonal allergies     Hypertension     no meds at this time    Morbid obesity ([address]     Morbid obesity with BMI of 40.0-44.9, adult ([address]     Multiple sclerosis ([address] 2008    Osteoarthritis of left knee 03/24/2016    Osteoarthritis of right knee     Overactive bladder     Rheumatoid arthritis ([address]     patient denies    Thyroid cancer [facility])      Past Surgical History: Procedure Laterality Date    HX  SECTION   &     HX GASTRIC BYPASS      gastric sleeve    HX HERNIA REPAIR      umbilical x2    HX HYSTERECTOMY      RYNE    [facility]      [facility]- [facility]    HX ORTHOPAEDIC Bilateral     partial knee replacement    HX ORTHOPAEDIC Left     knee \"ligament repair\"    HX OTHER SURGICAL      fatty tumor removal left arm    HX THYROIDECTOMY      2014- [facility]    HX TONSILLECTOMY      DE LAP, RADHA RESTRICT PROC, LONGITUDINAL GASTRECTOMY      2017     Current Outpatient Medications   Medication Sig Dispense Refill    telmisartan (Micardis) 20 mg tablet Take  by mouth daily.  cyanocobalamin (Vitamin B-12) 500 mcg tablet Take 500 mcg by mouth daily.  fluticasone propionate (FLONASE NA) by Nasal route.  atorvastatin (Lipitor) 10 mg tablet Take 10 mg by mouth nightly.  pantoprazole (Protonix) 20 mg tablet Take 40 mg by mouth daily.  ocrelizumab (OCREVUS) 30 mg/mL soln injection by IntraVENous route. q6months      cyanocobalamin (VITAMIN B-12) 1,000 mcg tablet Take 500 mcg by mouth every seven (7) days.  calcium-cholecalciferol, d3, (CALCIUM 600 + D) 600-125 mg-unit tab Take 600 mg by mouth daily.  cholecalciferol (VITAMIN D3) 1,000 unit cap Take 1,000 Units by mouth daily.  b complex vitamins (B COMPLEX 1) tablet Take 1 Tab by mouth daily.  levothyroxine (SYNTHROID) 112 mcg tablet Take 100 mcg by mouth Daily (before breakfast). Indications: additional treatment for thyroid cancer      acetaminophen (TYLENOL EXTRA STRENGTH) 500 mg tablet Take 650 mg by mouth every six (6) hours as needed for Pain.  multivitamin (ONE A DAY) tablet Take 1 Tab by mouth daily.  gabapentin (NEURONTIN) 300 mg capsule Take 600 mg by mouth two (2) times a day.  zolpidem (AMBIEN) 10 mg tablet Take 5 mg by mouth nightly as needed for Sleep.       baclofen (LIORESAL) 10 mg tablet Take 10 mg by mouth two (2) times a day. Indications: MS      cycloSPORINE (RESTASIS) 0.05 % ophthalmic emulsion Administer 1 Drop to both eyes two (2) times a day.  EPINEPHrine (EPIPEN) 0.3 mg/0.3 mL (1:1,000) injection 0.3 mg by IntraMUSCular route once as needed for Anaphylaxis (Iodine/Shellfish). Indications: ANAPHYLAXIS      DULoxetine (CYMBALTA) 60 mg capsule Take 60 mg by mouth nightly. Indications: ANXIETY WITH DEPRESSION, NEUROPATHIC PAIN         Review of Symptoms:     General - No history or complaints of unexpected fever or chills  Head/Neck - No history or complaints of headache or dizziness  Cardiac - No history or complaints of chest pain, palpitations, or shortness of breath  Pulmonary - No history or complaints of shortness of breath or productive cough  Gastrointestinal - as noted above  Genitourinary - No history or complaints of hematuria/dysuria or renal lithiasis  Musculoskeletal - No history or complaints of joint  muscular weakness  Hematologic - No history of any bleeding episodes  Neurologic - No history or complaints of  migraine headaches or neurologic symptoms    Objective: There were no vitals taken for this visit. Physical Exam:    General:  alert, cooperative, no distress, appears stated age   Lungs:   clear to auscultation bilaterally   Heart:  Regular rate and rhythm   Abdomen:   abdomen is soft without significant tenderness, masses, organomegaly or guarding; Incisions: healing well, no hernias         Labs:     No results found for this or any previous visit (from the past 2016 hour(s)). Assessment:     1. History of Morbid obesity, status post  laparoscopic sleeve gastrectomy. Doing well; no concerns. Needs more protein intake. Plan:     1. Remember to measure portions, continue low carbohydrate diet  2. Reviewed labs and appropriate changes made to vitamin regimen  3. Remember vitamin supplements. 4. Exercise regimen appears adequate.   5. Attend support group  6. Follow-up in 1 year(s). 7. The patient understands the plan of action  8. Total time spent with the patient 30 minutes.

## 2021-09-09 ENCOUNTER — HOSPITAL ENCOUNTER (OUTPATIENT)
Dept: PREADMISSION TESTING | Age: 61
Discharge: HOME | End: 2021-09-09

## 2021-09-09 VITALS — BODY MASS INDEX: 33.43 KG/M2 | WEIGHT: 208 LBS | HEIGHT: 66 IN

## 2021-09-09 RX ORDER — SODIUM CHLORIDE, SODIUM LACTATE, POTASSIUM CHLORIDE, CALCIUM CHLORIDE 600; 310; 30; 20 MG/100ML; MG/100ML; MG/100ML; MG/100ML
125 INJECTION, SOLUTION INTRAVENOUS CONTINUOUS
Status: CANCELLED | OUTPATIENT
Start: 2021-09-09

## 2021-09-09 RX ORDER — CEFAZOLIN SODIUM/WATER 2 G/20 ML
2 SYRINGE (ML) INTRAVENOUS ONCE
Status: CANCELLED | OUTPATIENT
Start: 2021-09-09 | End: 2021-09-09

## 2021-09-09 NOTE — PERIOP NOTES
Patient educated on NPO, CHG, and current COVID 19 protocols. Patient states   is fully vaccinated, with vaccination record in Media. [facility] with self quarantine till [address]. Medications reviewed. Patient educated on current visitation policies. Patient advised to leave valuables at home or with a loved one. All questions answered by RN. Preoperative Nutrition Screen (NATO)   Patient's Age: 63 y.o. Patient's BMI: Estimated body mass index is 33.57 kg/m² as calculated from the following:    Height as of this encounter: 5' 6\" (1.676 m). Weight as of this encounter: 94.3 kg (208 lb). 1. Does the patient have a documented serum albumin less than 3.0 within the last 90 days? No = 0          2. Is patient's BMI less than 18.5 (or less than 20 if age over 72)? No = 0        3. Has the patient had an unplanned weight loss of 10% of body weight or more in the last 6 months? No = 0   4. Has the patient been eating less than 50% of their normal diet in the preceding week?  No = 0   NATO Score (number of Yes responses), 0-4 0       Electronically signed by Letitia Huitron on 9/9/21 at 10:58 AM

## 2021-09-23 ENCOUNTER — HOSPITAL ENCOUNTER (OUTPATIENT)
Dept: PREADMISSION TESTING | Age: 61
Discharge: HOME | End: 2021-09-23
Payer: MEDICARE

## 2021-09-23 PROCEDURE — U0003 INFECTIOUS AGENT DETECTION BY NUCLEIC ACID (DNA OR RNA); SEVERE ACUTE RESPIRATORY SYNDROME CORONAVIRUS 2 (SARS-COV-2) (CORONAVIRUS DISEASE [COVID-19]), AMPLIFIED PROBE TECHNIQUE, MAKING USE OF HIGH THROUGHPUT TECHNOLOGIES AS DESCRIBED BY CMS-2020-01-R: HCPCS

## 2021-09-24 ENCOUNTER — HOSPITAL ENCOUNTER (OUTPATIENT)
Dept: PREADMISSION TESTING | Age: 61
Discharge: HOME | End: 2021-09-24
Payer: MEDICARE

## 2021-09-24 PROCEDURE — U0003 INFECTIOUS AGENT DETECTION BY NUCLEIC ACID (DNA OR RNA); SEVERE ACUTE RESPIRATORY SYNDROME CORONAVIRUS 2 (SARS-COV-2) (CORONAVIRUS DISEASE [COVID-19]), AMPLIFIED PROBE TECHNIQUE, MAKING USE OF HIGH THROUGHPUT TECHNOLOGIES AS DESCRIBED BY CMS-2020-01-R: HCPCS

## 2021-09-25 LAB — SARS-COV-2, COV2NT: NOT DETECTED

## 2021-09-26 PROBLEM — T84.82XA ARTHROFIBROSIS OF TOTAL KNEE ARTHROPLASTY (HCC): Chronic | Status: ACTIVE | Noted: 2021-09-26

## 2021-09-26 NOTE — H&P
[address] [facility] [address]  History and Physical Exam    Patient: Adelita Garza MRN: [id number]  SSN: [id number]    YOB: 1960  Age: 63 y.o. Sex: female      Subjective:      Chief Complaint: Left knee pain and stiffness    History of Present Illness:  Patient complains of continued left knee pain and stiffness following total knee arthroplasty on 4/12/2021. The patient has not had success with physical therapy or home exercise program and continues to lack range of motion. The patient has at this time opted for manipulation under anesthesia.         Past Medical History:   Diagnosis Date    Adverse effect of anesthesia     small airway, difficult intubation, pt requesting anesthesia consult    Arthrofibrosis of total knee arthroplasty [facility] 9/26/2021    Asthma     seasonal; no inhaler    Borderline diabetes     Chronic pain     lower back, arms and legs    Depression     Diabetes ([address]     diet control and exercise    Difficult intubation     GERD (gastroesophageal reflux disease)     H/O seasonal allergies     Hypertension     no meds at this time    Ill-defined condition     MS    Morbid obesity ([address]     Morbid obesity with BMI of 40.0-44.9, adult ([address]     Multiple sclerosis ([address] 2008    Osteoarthritis of left knee 03/24/2016    Osteoarthritis of right knee     Overactive bladder     Rheumatoid arthritis ([address]     patient denies    Thyroid cancer [facility])      Past Surgical History:   Procedure Laterality Date     [address]    HX GASTRIC BYPASS  2017    gastric sleeve    HX HERNIA REPAIR      umbilical x2    HX HYSTERECTOMY  2004    RYNE    HX KNEE REPLACEMENT Left 2021    HX LAP CHOLECYSTECTOMY      2004- [address]    HX ORTHOPAEDIC Bilateral     partial knee replacement    HX ORTHOPAEDIC Left     knee \"ligament repair\"    HX OTHER SURGICAL      fatty tumor removal left arm    H[facility]      [facility]- [facility]    HX TONSILLECTOMY      IN LAP, RADHA RESTRICT PROC, LONGITUDINAL GASTRECTOMY      2017     Social History     Occupational History    Not on file   Tobacco Use    Smoking status: Former Smoker     Quit date: 3/9/2002     Years since quittin.5    Smokeless tobacco: Never Used   Vaping Use    Vaping Use: Never used   Substance and Sexual Activity    Alcohol use: Yes     Comment: 2 alcohol beverages per month    Drug use: Yes     Frequency: 2.0 times per week     Types: Marijuana     Comment: edibles; patient advised to hold for 7 days prior to DOS.  Sexual activity: Yes     Prior to Admission medications    Medication Sig Start Date End Date Taking? Authorizing Provider   telmisartan (Micardis) 20 mg tablet Take 20 mg by mouth daily. Provider, Historical   fluticasone propionate (FLONASE NA) by Nasal route. Provider, Historical   atorvastatin (Lipitor) 10 mg tablet Take 10 mg by mouth nightly. Provider, Historical   pantoprazole (Protonix) 20 mg tablet Take 40 mg by mouth daily. Provider, Historical   ocrelizumab (OCREVUS) 30 mg/mL soln injection by IntraVENous route. q6months    Provider, Historical   cyanocobalamin (VITAMIN B-12) 1,000 mcg tablet Take 500 mcg by mouth every seven (7) days. Provider, Historical   calcium-cholecalciferol, d3, (CALCIUM 600 + D) 600-125 mg-unit tab Take 600 mg by mouth daily. Provider, Historical   cholecalciferol (VITAMIN D3) 1,000 unit cap Take 1,000 Units by mouth daily. Provider, Historical   b complex vitamins (B COMPLEX 1) tablet Take 1 Tab by mouth daily. Provider, Historical   levothyroxine (SYNTHROID) 112 mcg tablet Take 88 mcg by mouth Daily (before breakfast). Indications: additional treatment for thyroid cancer    Provider, Historical   acetaminophen (TYLENOL EXTRA STRENGTH) 500 mg tablet Take 650 mg by mouth every six (6) hours as needed for Pain.     Provider, Historical   multivitamin (ONE A DAY) tablet Take 1 Tab by mouth daily. Provider, Historical   gabapentin (NEURONTIN) 300 mg capsule Take 600 mg by mouth two (2) times a day. Provider, Historical   zolpidem (AMBIEN) 10 mg tablet Take 5 mg by mouth nightly as needed for Sleep. Provider, Historical   baclofen (LIORESAL) 10 mg tablet Take 10 mg by mouth two (2) times a day. Indications: MS    Provider, Historical   cycloSPORINE (RESTASIS) 0.05 % ophthalmic emulsion Administer 1 Drop to both eyes two (2) times a day. Provider, Historical   EPINEPHrine (EPIPEN) 0.3 mg/0.3 mL (1:1,000) injection 0.3 mg by IntraMUSCular route once as needed for Anaphylaxis (Iodine/Shellfish). Indications: ANAPHYLAXIS    Provider, Historical   DULoxetine (CYMBALTA) 60 mg capsule Take 60 mg by mouth nightly. Indications: ANXIETY WITH DEPRESSION, NEUROPATHIC PAIN    Provider, Historical       Allergies: Allergies   Allergen Reactions    Shellfish Derived Shortness of Breath and Swelling    Adhesive Tape-Silicones Other (comments)     Skin peels and blister    Dilaudid [Hydromorphone] Other (comments)     \"uncontrollable crying\"    Sulfa (Sulfonamide Antibiotics) Itching        Review of Systems:  Pertinent items are noted in the History of Present Illness. Objective:       Physical Exam:  HEENT: Normocephalic, atraumatic  Lungs:  Clear to auscultation  Heart:   Regular rate and rhythm  Abdomen: Soft  Extremities:  left knee incision well healed. Mild knee swelling. ROM limited to  degrees. Assessment:      Arthrofibrosis of left knee arthroplasty. Plan:       Proceed with scheduled left knee manipulation under anesthesia. The various methods of treatment have been discussed with the patient and family. After consideration of risks, benefits, and other options for treatment, the patient has consented to surgical interventions. Questions were answered and preoperative teaching was done by Dr Lorene Nunes.      Signed By: HADLEY Chirinos     September 26, 2021

## 2021-09-27 ENCOUNTER — APPOINTMENT (OUTPATIENT)
Dept: PHYSICAL THERAPY | Age: 61
End: 2021-09-27

## 2021-09-27 ENCOUNTER — HOSPITAL ENCOUNTER (OUTPATIENT)
Age: 61
Setting detail: OUTPATIENT SURGERY
Discharge: HOME HEALTH | End: 2021-09-27
Attending: ORTHOPAEDIC SURGERY | Admitting: ORTHOPAEDIC SURGERY
Payer: MEDICARE

## 2021-09-27 ENCOUNTER — ANESTHESIA (OUTPATIENT)
Dept: SURGERY | Age: 61
End: 2021-09-27
Payer: MEDICARE

## 2021-09-27 ENCOUNTER — ANESTHESIA EVENT (OUTPATIENT)
Dept: SURGERY | Age: 61
End: 2021-09-27
Payer: MEDICARE

## 2021-09-27 VITALS
DIASTOLIC BLOOD PRESSURE: 55 MMHG | SYSTOLIC BLOOD PRESSURE: 124 MMHG | HEIGHT: 66 IN | HEART RATE: 74 BPM | OXYGEN SATURATION: 100 % | RESPIRATION RATE: 16 BRPM | TEMPERATURE: 96.5 F | BODY MASS INDEX: 34.01 KG/M2 | WEIGHT: 211.6 LBS

## 2021-09-27 DIAGNOSIS — T84.82XD ARTHROFIBROSIS OF TOTAL KNEE REPLACEMENT, SUBSEQUENT ENCOUNTER: Primary | ICD-10-CM

## 2021-09-27 LAB
GLUCOSE BLD STRIP.AUTO-MCNC: 116 MG/DL (ref 70–110)
GLUCOSE BLD STRIP.AUTO-MCNC: 98 MG/DL (ref 70–110)

## 2021-09-27 PROCEDURE — 74011250636 HC RX REV CODE- 250/636: Performed by: NURSE ANESTHETIST, CERTIFIED REGISTERED

## 2021-09-27 PROCEDURE — 82962 GLUCOSE BLOOD TEST: CPT

## 2021-09-27 PROCEDURE — 76060000031 HC ANESTHESIA FIRST 0.5 HR: Performed by: ORTHOPAEDIC SURGERY

## 2021-09-27 PROCEDURE — 74011000250 HC RX REV CODE- 250: Performed by: NURSE ANESTHETIST, CERTIFIED REGISTERED

## 2021-09-27 PROCEDURE — 76010000154 HC OR TIME FIRST 0.5 HR: Performed by: ORTHOPAEDIC SURGERY

## 2021-09-27 PROCEDURE — 74011250636 HC RX REV CODE- 250/636: Performed by: ORTHOPAEDIC SURGERY

## 2021-09-27 PROCEDURE — 74011250636 HC RX REV CODE- 250/636: Performed by: STUDENT IN AN ORGANIZED HEALTH CARE EDUCATION/TRAINING PROGRAM

## 2021-09-27 PROCEDURE — 76210000063 HC OR PH I REC FIRST 0.5 HR: Performed by: ORTHOPAEDIC SURGERY

## 2021-09-27 PROCEDURE — 77030020782 HC GWN BAIR PAWS FLX 3M -B: Performed by: ORTHOPAEDIC SURGERY

## 2021-09-27 PROCEDURE — 76942 ECHO GUIDE FOR BIOPSY: CPT | Performed by: ORTHOPAEDIC SURGERY

## 2021-09-27 PROCEDURE — 76210000021 HC REC RM PH II 0.5 TO 1 HR: Performed by: ORTHOPAEDIC SURGERY

## 2021-09-27 PROCEDURE — 74011000250 HC RX REV CODE- 250: Performed by: STUDENT IN AN ORGANIZED HEALTH CARE EDUCATION/TRAINING PROGRAM

## 2021-09-27 RX ORDER — ROPIVACAINE HYDROCHLORIDE 5 MG/ML
INJECTION, SOLUTION EPIDURAL; INFILTRATION; PERINEURAL
Status: SHIPPED | OUTPATIENT
Start: 2021-09-27 | End: 2021-09-27

## 2021-09-27 RX ORDER — MIDAZOLAM HYDROCHLORIDE 1 MG/ML
INJECTION, SOLUTION INTRAMUSCULAR; INTRAVENOUS
Status: SHIPPED | OUTPATIENT
Start: 2021-09-27 | End: 2021-09-27

## 2021-09-27 RX ORDER — DEXMEDETOMIDINE HYDROCHLORIDE 100 UG/ML
INJECTION, SOLUTION INTRAVENOUS
Status: SHIPPED | OUTPATIENT
Start: 2021-09-27 | End: 2021-09-27

## 2021-09-27 RX ORDER — SODIUM CHLORIDE, SODIUM LACTATE, POTASSIUM CHLORIDE, CALCIUM CHLORIDE 600; 310; 30; 20 MG/100ML; MG/100ML; MG/100ML; MG/100ML
125 INJECTION, SOLUTION INTRAVENOUS CONTINUOUS
Status: DISCONTINUED | OUTPATIENT
Start: 2021-09-27 | End: 2021-09-27 | Stop reason: HOSPADM

## 2021-09-27 RX ORDER — DEXAMETHASONE SODIUM PHOSPHATE 10 MG/ML
INJECTION INTRAMUSCULAR; INTRAVENOUS
Status: SHIPPED | OUTPATIENT
Start: 2021-09-27 | End: 2021-09-27

## 2021-09-27 RX ORDER — SODIUM CHLORIDE 0.9 % (FLUSH) 0.9 %
5-40 SYRINGE (ML) INJECTION AS NEEDED
Status: DISCONTINUED | OUTPATIENT
Start: 2021-09-27 | End: 2021-09-27 | Stop reason: HOSPADM

## 2021-09-27 RX ORDER — SODIUM CHLORIDE, SODIUM LACTATE, POTASSIUM CHLORIDE, CALCIUM CHLORIDE 600; 310; 30; 20 MG/100ML; MG/100ML; MG/100ML; MG/100ML
50 INJECTION, SOLUTION INTRAVENOUS CONTINUOUS
Status: DISCONTINUED | OUTPATIENT
Start: 2021-09-27 | End: 2021-09-27 | Stop reason: HOSPADM

## 2021-09-27 RX ORDER — CELECOXIB 100 MG/1
400 CAPSULE ORAL
Status: CANCELLED | OUTPATIENT
Start: 2021-09-27 | End: 2021-09-27

## 2021-09-27 RX ORDER — NALOXONE HYDROCHLORIDE 0.4 MG/ML
0.04 INJECTION, SOLUTION INTRAMUSCULAR; INTRAVENOUS; SUBCUTANEOUS
Status: DISCONTINUED | OUTPATIENT
Start: 2021-09-27 | End: 2021-09-27 | Stop reason: HOSPADM

## 2021-09-27 RX ORDER — CEFAZOLIN SODIUM/WATER 2 G/20 ML
2 SYRINGE (ML) INTRAVENOUS ONCE
Status: DISCONTINUED | OUTPATIENT
Start: 2021-09-27 | End: 2021-09-27

## 2021-09-27 RX ORDER — FENTANYL CITRATE 50 UG/ML
50 INJECTION, SOLUTION INTRAMUSCULAR; INTRAVENOUS
Status: DISCONTINUED | OUTPATIENT
Start: 2021-09-27 | End: 2021-09-27 | Stop reason: HOSPADM

## 2021-09-27 RX ORDER — ACETAMINOPHEN 500 MG
1000 TABLET ORAL
Status: CANCELLED | OUTPATIENT
Start: 2021-09-27 | End: 2021-09-27

## 2021-09-27 RX ORDER — PROPOFOL 10 MG/ML
INJECTION, EMULSION INTRAVENOUS AS NEEDED
Status: DISCONTINUED | OUTPATIENT
Start: 2021-09-27 | End: 2021-09-27 | Stop reason: HOSPADM

## 2021-09-27 RX ORDER — NALBUPHINE HYDROCHLORIDE 10 MG/ML
5 INJECTION, SOLUTION INTRAMUSCULAR; INTRAVENOUS; SUBCUTANEOUS
Status: DISCONTINUED | OUTPATIENT
Start: 2021-09-27 | End: 2021-09-27 | Stop reason: HOSPADM

## 2021-09-27 RX ORDER — OXYCODONE HYDROCHLORIDE 5 MG/1
5 TABLET ORAL
Qty: 42 TABLET | Refills: 0 | Status: SHIPPED | OUTPATIENT
Start: 2021-09-27 | End: 2021-10-04

## 2021-09-27 RX ORDER — DIPHENHYDRAMINE HYDROCHLORIDE 50 MG/ML
12.5 INJECTION, SOLUTION INTRAMUSCULAR; INTRAVENOUS
Status: DISCONTINUED | OUTPATIENT
Start: 2021-09-27 | End: 2021-09-27 | Stop reason: HOSPADM

## 2021-09-27 RX ORDER — HYDROMORPHONE HYDROCHLORIDE 1 MG/ML
0.5 INJECTION, SOLUTION INTRAMUSCULAR; INTRAVENOUS; SUBCUTANEOUS AS NEEDED
Status: DISCONTINUED | OUTPATIENT
Start: 2021-09-27 | End: 2021-09-27 | Stop reason: HOSPADM

## 2021-09-27 RX ORDER — SODIUM CHLORIDE 0.9 % (FLUSH) 0.9 %
5-40 SYRINGE (ML) INJECTION EVERY 8 HOURS
Status: DISCONTINUED | OUTPATIENT
Start: 2021-09-27 | End: 2021-09-27 | Stop reason: HOSPADM

## 2021-09-27 RX ORDER — FENTANYL CITRATE 50 UG/ML
INJECTION, SOLUTION INTRAMUSCULAR; INTRAVENOUS
Status: SHIPPED | OUTPATIENT
Start: 2021-09-27 | End: 2021-09-27

## 2021-09-27 RX ORDER — LIDOCAINE HYDROCHLORIDE 20 MG/ML
INJECTION, SOLUTION EPIDURAL; INFILTRATION; INTRACAUDAL; PERINEURAL AS NEEDED
Status: DISCONTINUED | OUTPATIENT
Start: 2021-09-27 | End: 2021-09-27 | Stop reason: HOSPADM

## 2021-09-27 RX ADMIN — ROPIVACAINE HYDROCHLORIDE 20 ML: 5 INJECTION, SOLUTION EPIDURAL; INFILTRATION; PERINEURAL at 07:03

## 2021-09-27 RX ADMIN — SODIUM CHLORIDE, SODIUM LACTATE, POTASSIUM CHLORIDE, AND CALCIUM CHLORIDE 125 ML/HR: 600; 310; 30; 20 INJECTION, SOLUTION INTRAVENOUS at 06:36

## 2021-09-27 RX ADMIN — DEXAMETHASONE SODIUM PHOSPHATE 4 MG: 10 INJECTION, SOLUTION INTRAMUSCULAR; INTRAVENOUS at 07:03

## 2021-09-27 RX ADMIN — LIDOCAINE HYDROCHLORIDE 40 MG: 20 INJECTION, SOLUTION INTRAVENOUS at 07:28

## 2021-09-27 RX ADMIN — DEXMEDETOMIDINE HYDROCHLORIDE 25 MCG: 100 INJECTION, SOLUTION INTRAVENOUS at 07:03

## 2021-09-27 RX ADMIN — MIDAZOLAM 2 MG: 1 INJECTION INTRAMUSCULAR; INTRAVENOUS at 07:03

## 2021-09-27 RX ADMIN — ROPIVACAINE HYDROCHLORIDE 20 ML: 5 INJECTION, SOLUTION EPIDURAL; INFILTRATION; PERINEURAL at 07:06

## 2021-09-27 RX ADMIN — FENTANYL CITRATE 100 MCG: 50 INJECTION, SOLUTION INTRAMUSCULAR; INTRAVENOUS at 07:03

## 2021-09-27 RX ADMIN — PROPOFOL 100 MG: 10 INJECTION, EMULSION INTRAVENOUS at 07:28

## 2021-09-27 NOTE — ANESTHESIA PROCEDURE NOTES
Peripheral Block    Start time: 9/27/2021 7:03 AM  End time: 9/27/2021 7:06 AM  Performed by: Dede Morocho MD  Authorized by: Dede Morocho MD       Pre-procedure: Indications: at surgeon's request and post-op pain management    Preanesthetic Checklist: patient identified, risks and benefits discussed, site marked, timeout performed, anesthesia consent given and patient being monitored    Timeout Time: 07:06 EDT          Block Type:   Block Type:  IPACK  Laterality:  Left  Monitoring:  Standard ASA monitoring, responsive to questions, oxygen, continuous pulse ox, frequent vital sign checks and heart rate  Injection Technique:  Single shot  Procedures: ultrasound guided    Patient Position: supine  Prep: chlorhexidine    Location:  Lower thigh  Needle Type:  Stimuplex  Needle Gauge:  20 G  Needle Localization:  Ultrasound guidance  Medication Injected:  Ropivacaine (PF) (NAROPIN) 5 mg/mL (0.5 %) injection, 20 mL  Med Admin Time: 9/27/2021 7:06 AM    Assessment:  Number of attempts:  1  Injection Assessment:  Incremental injection every 5 mL, negative aspiration for CSF, no paresthesia, ultrasound image on chart, local visualized surrounding nerve on ultrasound, negative aspiration for blood and no intravascular symptoms  Patient tolerance:  Patient tolerated the procedure well with no immediate complications  Ultrasound guidance was used to view and verify medication disbursement and was also used for needle placement.

## 2021-09-27 NOTE — OP NOTES
[facility]  OPERATIVE REPORT    Name:  Wero Doran  MR#:   [id number]  :  1960  ACCOUNT #:  [de-identified]  DATE OF SERVICE:  2021    PREOPERATIVE DIAGNOSIS:  Arthrofibrosis, status post left total knee arthroplasty. POSTOPERATIVE DIAGNOSIS:  Arthrofibrosis, status post left total knee arthroplasty. PROCEDURE PERFORMED:  Manipulation under anesthesia of the left knee. SURGEON:  Yohana Desai MD    ASSISTANT:  None. ANESTHESIA:  Adductor and iPACK blocks with IV sedation. COMPLICATIONS:  None. SPECIMENS REMOVED:  None. IMPLANTS:  None. ESTIMATED BLOOD LOSS:  0.    PROCEDURE:  After the patient was brought into the operating suite, she was left supine on the stretcher. She was then anesthetized in the preop holding area with an adductor and iPACK nerve blocks. After proper time-out was performed, she was given IV sedation and her left knee was then manipulated with palpable and audible lysis of adhesions. Her preoperative range of motion was from 0 to approximately 95 degrees, postoperatively she had flexion easily to 135 degrees with no instability. She was then taken to the recovery room on the stretcher extubated in a stable condition.       MD CHENTE Greenfield/[email or web address]  D:  2021 9:30  T:  2021 16:56  JOB #:  [id number]

## 2021-09-27 NOTE — ANESTHESIA POSTPROCEDURE EVALUATION
Post-Anesthesia Evaluation and Assessment    Cardiovascular Function/Vital Signs  Visit Vitals  BP (!) 125/45   Pulse 73   Temp 36.2 °C (97.2 °F)   Resp 13   Ht 5' 6\" (1.676 m)   Wt 96 kg (211 lb 9.6 oz)   SpO2 100%   BMI 34.15 kg/m²       Patient is status post Procedure(s):  MANIPULATION OF LEFT KNEE. Nausea/Vomiting: Controlled. Postoperative hydration reviewed and adequate. Pain:  Pain Scale 1: Numeric (0 - 10) (09/27/21 0749)  Pain Intensity 1: 0 (09/27/21 0749)   Managed. Neurological Status:   Neuro (WDL): Exceptions to WDL (09/27/21 0749)   At baseline. Mental Status and Level of Consciousness: Baseline and appropriate for discharge. Pulmonary Status:   O2 Device: None (Room air) (09/27/21 0749)   Adequate oxygenation and airway patent. Complications related to anesthesia: None    Post-anesthesia assessment completed. No concerns. Patient has met all discharge requirements.     Signed By: Robinson Gotti MD    September 27, 2021

## 2021-09-27 NOTE — INTERVAL H&P NOTE
Update History & Physical    The Patient's History and Physical of September 26, 2021 was reviewed with the patient and I examined the patient. There was no change. The surgical site was confirmed by the patient and me. Plan:  The risk, benefits, expected outcome, and alternative to the recommended procedure have been discussed with the patient. Patient understands and wants to proceed with the procedure.     Electronically signed by Alex Duarte MD on 9/27/2021 at 6:40 AM

## 2021-09-27 NOTE — ANESTHESIA PROCEDURE NOTES
Peripheral Block    Start time: 9/27/2021 7:00 AM  End time: 9/27/2021 7:03 AM  Performed by: Dat Israel MD  Authorized by: Dat Israel MD       Pre-procedure: Indications: at surgeon's request and post-op pain management    Preanesthetic Checklist: patient identified, risks and benefits discussed, site marked, timeout performed, anesthesia consent given and patient being monitored    Timeout Time: 07:00 EDT          Block Type:   Block Type: Adductor canal block  Laterality:  Left  Monitoring:  Standard ASA monitoring, responsive to questions, oxygen, continuous pulse ox, frequent vital sign checks and heart rate  Injection Technique:  Single shot  Procedures: ultrasound guided    Prep: chlorhexidine    Location:  Mid thigh  Needle Type:  Stimuplex  Needle Gauge:  20 G  Needle Localization:  Ultrasound guidance  Medication Injected:  FentaNYL citrate (PF) injection sedation initial, 100 mcg  midazolam (VERSED) injection, 2 mg  ropivacaine (PF) (NAROPIN) 5 mg/mL (0.5 %) injection, 20 mL  dexamethasone (PF) (DECADRON) 10 mg/mL injection, 4 mg  dexmedeTOMidine (PRECEDEX) 100 mcg/mL iv solution, 25 mcg  Med Admin Time: 9/27/2021 7:03 AM    Assessment:  Number of attempts:  1  Injection Assessment:  Incremental injection every 5 mL, negative aspiration for CSF, no paresthesia, ultrasound image on chart, local visualized surrounding nerve on ultrasound, negative aspiration for blood and no intravascular symptoms  Patient tolerance:  Patient tolerated the procedure well with no immediate complications  Ultrasound guidance was used to view and verify medication disbursement and was also used for needle placement.

## 2021-09-27 NOTE — PERIOP NOTES
TRANSFER - OUT REPORT:    Verbal report given to Daniel Salazar RN(name) on Roxy Chavarria  being transferred to phase 2(unit) for routine post - op       Report consisted of patients Situation, Background, Assessment and   Recommendations(SBAR). Information from the following report(s) SBAR, Kardex, OR Summary, Procedure Summary, Intake/Output and MAR was reviewed with the receiving nurse. Lines:   Peripheral IV 09/27/21 Right Forearm (Active)   Site Assessment Clean, dry, & intact 09/27/21 0741   Phlebitis Assessment 0 09/27/21 0741   Infiltration Assessment 0 09/27/21 0741   Dressing Status Clean, dry, & intact 09/27/21 0741   Dressing Type Transparent;Tape 09/27/21 0741   Hub Color/Line Status Pink; Infusing 09/27/21 0741        Opportunity for questions and clarification was provided.       Patient transported with:   Registered Nurse  Tech

## 2021-09-27 NOTE — ANESTHESIA PREPROCEDURE EVALUATION
Relevant Problems   RESPIRATORY SYSTEM   (+) Asthma      ENDOCRINE   (+) Rheumatoid arthritis (HCC)      PERSONAL HX & FAMILY HX OF CANCER   (+) Thyroid cancer (HCC)       Anesthetic History     Increased risk of difficult airway   Pertinent negatives: No PONV       Review of Systems / Medical History  Patient summary reviewed and pertinent labs reviewed    Pulmonary            Asthma : well controlled  Pertinent negatives: No COPD, sleep apnea and smoker     Neuro/Psych         Psychiatric history (Depression)  Pertinent negatives: No seizures and CVA   Cardiovascular    Hypertension            Pertinent negatives: No past MI and CHF  Exercise tolerance: >4 METS     GI/Hepatic/Renal     GERD: well controlled        Pertinent negatives: No hiatal hernia, liver disease and renal disease   Endo/Other    Diabetes: well controlled, type 2  Hypothyroidism: well controlled  Arthritis  Pertinent negatives: No morbid obesity   Other Findings              Physical Exam    Airway  Mallampati: III  TM Distance: > 6 cm  Neck ROM: normal range of motion   Mouth opening: Normal     Cardiovascular    Rhythm: regular  Rate: normal         Dental    Dentition: Poor dentition     Pulmonary  Breath sounds clear to auscultation               Abdominal  GI exam deferred       Other Findings            Anesthetic Plan    ASA: 2  Anesthesia type: general      Post-op pain plan if not by surgeon: peripheral nerve block single    Induction: Intravenous  Anesthetic plan and risks discussed with: Patient

## 2021-09-27 NOTE — PERIOP NOTES
Pt. Used restroom in pre-op area with assistance. Patient placed on Ebony Paws for a minimum of 30 min in  Preop.

## 2021-09-27 NOTE — DISCHARGE INSTRUCTIONS
DISCHARGE SUMMARY from Nurse    PATIENT INSTRUCTIONS:    After general anesthesia or intravenous sedation, for 24 hours or while taking prescription Narcotics:  · Limit your activities  · Do not drive and operate hazardous machinery  · Do not make important personal or business decisions  · Do  not drink alcoholic beverages  · If you have not urinated within 8 hours after discharge, please contact your surgeon on call. Report the following to your surgeon:  · Excessive pain, swelling, redness or odor of or around the surgical area  · Temperature over 100.5  · Nausea and vomiting lasting longer than 4 hours or if unable to take medications  · Any signs of decreased circulation or nerve impairment to extremity: change in color, persistent  numbness, tingling, coldness or increase pain  · Any questions    What to do at Home:  [address]    If you experience any of the following symptoms bleeding, fevers, severe pain, circulation changes, please follow up with dr Beth Galindo    *  Please give a list of your current medications to your Primary Care Provider. *  Please update this list whenever your medications are discontinued, doses are      changed, or new medications (including over-the-counter products) are added. *  Please carry medication information at all times in case of emergency situations. These are general instructions for a healthy lifestyle:    No smoking/ No tobacco products/ Avoid exposure to second hand smoke  Surgeon General's Warning:  Quitting smoking now greatly reduces serious risk to your health.     Obesity, smoking, and sedentary lifestyle greatly increases your risk for illness    A healthy diet, regular physical exercise & weight monitoring are important for maintaining a healthy lifestyle    You may be retaining fluid if you have a history of heart failure or if you experience any of the following symptoms: Weight gain of 3 pounds or more overnight or 5 pounds in a week, increased swelling in our hands or feet or shortness of breath while lying flat in bed. Please call your doctor as soon as you notice any of these symptoms; do not wait until your next office visit. The discharge information has been reviewed with the patient and caregiver. The patient and caregiver verbalized understanding. Discharge medications reviewed with the patient and caregiver and appropriate educational materials and side effects teaching were provided. ___________________________________________________________________________________________________________________________________What to do at [address] [place] [address] Ave your prehospital diet. If you have excessive nausea or vomitting call your doctor's office. Be sure to maintain adequate fluid intake. Some pain medications may cause constipation. Remember to drink fluids, stay as active as possible, and eat plenty of fiber-rich foods. May progress weight bearing as tolerated. Use crutches or a cane to start.      Patient armband removed and shredded

## 2021-09-28 NOTE — DISCHARGE SUMMARY
[address] [facility] [address] [facility] [place] [address]     DISCHARGE SUMMARY     PATIENT: Alexandria Romero     MRN: [id number]   ADMIT DATE: 2021   BILLIN   DISCHARGE DATE: 2021     ATTENDING: Alejandra Diallo MD   DICTATING: HADLEY Quiles         ADMISSION DIAGNOSIS: ARTHROFIBROSIS OF LEFT KNEE JOINT    DISCHARGE DIAGNOSIS: Status post LEFT KNEE MANIPULATION UNDER ANESTHESIA. HISTORY OF PRESENT ILLNESS: The patient is a 63y.o. year-old female   with ongoing left knee pain secondary to arthrofibrosis of her left knee. The patient's pain has persisted and progressed despite conservative treatments and therapies. The patient has at this time opted for surgical intervention.     PAST MEDICAL HISTORY:   Past Medical History:   Diagnosis Date    Adverse effect of anesthesia     small airway, difficult intubation, pt requesting anesthesia consult    Arthrofibrosis of total knee arthroplasty ([address] 2021    Asthma     seasonal; no inhaler    Borderline diabetes     Chronic pain     lower back, arms and legs    Depression     Diabetes ([address]     diet control and exercise    Difficult intubation     GERD (gastroesophageal reflux disease)     H/O seasonal allergies     Hypertension     no meds at this time    Ill-defined condition     MS    Morbid obesity ([address]     Morbid obesity with BMI of 40.0-44.9, adult ([address]     Multiple sclerosis ([address]     Osteoarthritis of left knee 2016    Osteoarthritis of right knee     Overactive bladder     Rheumatoid arthritis ([address]     patient denies    Thyroid cancer ([address]        PAST SURGICAL HISTORY:   Past Surgical History:   Procedure Laterality Date    HX  SECTION   &     HX GASTRIC BYPASS  2017    gastric sleeve    HX HERNIA REPAIR      umbilical x2    HX HYSTERECTOMY      RYNE    HX KNEE REPLACEMENT Left     HX LAP CHOLECYSTECTOMY      [address]    HX ORTHOPAEDIC Bilateral     partial knee replacement    HX ORTHOPAEDIC Left     knee \"ligament repair\"    HX OTHER SURGICAL      fatty tumor removal left arm    HX THYROIDECTOMY      2014- [facility]    HX TONSILLECTOMY      [place] LAP, RADHA RESTRICT PROC, LONGITUDINAL GASTRECTOMY      9/12/2017       ALLERGIES:   Allergies   Allergen Reactions    Shellfish Derived Anaphylaxis, Shortness of Breath and Swelling     Able to use topical betadine w/out issues.  Adhesive Tape-Silicones Other (comments)     Skin peels and blister    Dilaudid [Hydromorphone] Other (comments)     \"uncontrollable crying\"        CURRENT MEDICATIONS:  A list of medications prior to the time of admission include:  Prior to Admission medications    Medication Sig Start Date End Date Taking? Authorizing Provider   oxyCODONE IR (ROXICODONE) 5 mg immediate release tablet Take 1 Tablet by mouth every four (4) hours as needed for Pain for up to 7 days. Max Daily Amount: 30 mg. 9/27/21 10/4/21 Yes Bertha Osuna PA   telmisartan (Micardis) 20 mg tablet Take 20 mg by mouth daily. Yes Provider, Historical   fluticasone propionate (FLONASE NA) by Nasal route. Yes Provider, Historical   atorvastatin (Lipitor) 10 mg tablet Take 10 mg by mouth nightly. Yes Provider, Historical   pantoprazole (Protonix) 20 mg tablet Take 40 mg by mouth daily. Yes Provider, Historical   ocrelizumab (OCREVUS) 30 mg/mL soln injection by IntraVENous route. q6months   Yes Provider, Historical   cyanocobalamin (VITAMIN B-12) 1,000 mcg tablet Take 500 mcg by mouth every seven (7) days. Yes Provider, Historical   calcium-cholecalciferol, d3, (CALCIUM 600 + D) 600-125 mg-unit tab Take 600 mg by mouth daily. Yes Provider, Historical   cholecalciferol (VITAMIN D3) 1,000 unit cap Take 1,000 Units by mouth daily. Yes Provider, Historical   b complex vitamins (B COMPLEX 1) tablet Take 1 Tab by mouth daily.    Yes Provider, Historical   levothyroxine (SYNTHROID) 112 mcg tablet Take 88 mcg by mouth Daily (before breakfast). Indications: additional treatment for thyroid cancer   Yes Provider, Historical   acetaminophen (TYLENOL EXTRA STRENGTH) 500 mg tablet Take 650 mg by mouth every six (6) hours as needed for Pain. Yes Provider, Historical   multivitamin (ONE A DAY) tablet Take 1 Tab by mouth daily. Yes Provider, Historical   gabapentin (NEURONTIN) 300 mg capsule Take 600 mg by mouth two (2) times a day. Yes Provider, Historical   zolpidem (AMBIEN) 10 mg tablet Take 5 mg by mouth nightly as needed for Sleep. Yes Provider, Historical   baclofen (LIORESAL) 10 mg tablet Take 10 mg by mouth two (2) times a day. Indications: MS   Yes Provider, Historical   cycloSPORINE (RESTASIS) 0.05 % ophthalmic emulsion Administer 1 Drop to both eyes two (2) times a day. Yes Provider, Historical   DULoxetine (CYMBALTA) 60 mg capsule Take 60 mg by mouth nightly. Indications: ANXIETY WITH DEPRESSION, NEUROPATHIC PAIN   Yes Provider, Historical   EPINEPHrine (EPIPEN) 0.3 mg/0.3 mL (1:1,000) injection 0.3 mg by IntraMUSCular route once as needed for Anaphylaxis (Iodine/Shellfish). Indications: ANAPHYLAXIS    Provider, Historical       FAMILY HISTORY: History reviewed. No pertinent family history. SOCIAL HISTORY:   Social History     Socioeconomic History    Marital status:      Spouse name: Not on file    Number of children: Not on file    Years of education: Not on file    Highest education level: Not on file   Tobacco Use    Smoking status: Former Smoker     Quit date: 3/9/2002     Years since quittin.5    Smokeless tobacco: Never Used   Vaping Use    Vaping Use: Never used   Substance and Sexual Activity    Alcohol use: Yes     Comment: 2 alcohol beverages per month    Drug use: Yes     Frequency: 2.0 times per week     Types: Marijuana     Comment: edibles; patient advised to hold for 7 days prior to DOS.      Sexual activity: Yes     Social Determinants of Health     Financial Resource Strain:     Difficulty of Paying Living Expenses:    Food Insecurity:     Worried About Running Out of Food in the Last Year:     [address] in the Last Year:    Transportation Needs:     Lack of Transportation (Medical):  Lack of Transportation (Non-Medical):    Physical Activity:     Days of Exercise per Week:     Minutes of Exercise per Session:    Stress:     Feeling of Stress :    Social Connections:     Frequency of Communication with Friends and Family:     Frequency of Social Gatherings with Friends and Family:     Attends Holiness Services:     Active Member of Clubs or Organizations:     Attends Club or Organization Meetings:     Marital Status:        REVIEW OF SYSTEMS: All review of systems are negative. PHYSICAL EXAMINATION: For a detailed physical exam, please refer to the patient's chart. HOSPITAL COURSE: The patient was taken to surgery the day of admission. she underwent a left knee manipulation with post-procedure range of motion 0-120+. Operative course was benign. Estimated blood loss was zero. The patient was taken to the PACU in stable condition and was later discharged home in stable condition. DISCHARGE INSTRUCTIONS: The patient is to be discharged home with the following medication changes:     Discharge Medication List as of 9/27/2021  8:20 AM      START taking these medications    Details   oxyCODONE IR (ROXICODONE) 5 mg immediate release tablet Take 1 Tablet by mouth every four (4) hours as needed for Pain for up to 7 days. Max Daily Amount: 30 mg., Normal, Disp-42 Tablet, R-0         CONTINUE these medications which have NOT CHANGED    Details   telmisartan (Micardis) 20 mg tablet Take 20 mg by mouth daily. , Historical Med      fluticasone propionate (FLONASE NA) by Nasal route., Historical Med      atorvastatin (Lipitor) 10 mg tablet Take 10 mg by mouth nightly., Historical Med      pantoprazole (Protonix) 20 mg tablet Take 40 mg by mouth daily. , Historical Med      ocrelizumab (OCREVUS) 30 mg/mL soln injection by IntraVENous route. q6months, Historical Med      cyanocobalamin (VITAMIN B-12) 1,000 mcg tablet Take 500 mcg by mouth every seven (7) days. , Historical Med      calcium-cholecalciferol, d3, (CALCIUM 600 + D) 600-125 mg-unit tab Take 600 mg by mouth daily. , Historical Med      cholecalciferol (VITAMIN D3) 1,000 unit cap Take 1,000 Units by mouth daily. , Historical Med      b complex vitamins (B COMPLEX 1) tablet Take 1 Tab by mouth daily. , Historical Med      levothyroxine (SYNTHROID) 112 mcg tablet Take 88 mcg by mouth Daily (before breakfast). Indications: additional treatment for thyroid cancer, Historical Med      acetaminophen (TYLENOL EXTRA STRENGTH) 500 mg tablet Take 650 mg by mouth every six (6) hours as needed for Pain., Historical Med      multivitamin (ONE A DAY) tablet Take 1 Tab by mouth daily. , Historical Med      gabapentin (NEURONTIN) 300 mg capsule Take 600 mg by mouth two (2) times a day., Historical Med      zolpidem (AMBIEN) 10 mg tablet Take 5 mg by mouth nightly as needed for Sleep., Historical Med      baclofen (LIORESAL) 10 mg tablet Take 10 mg by mouth two (2) times a day. Indications: MS, Historical Med      cycloSPORINE (RESTASIS) 0.05 % ophthalmic emulsion Administer 1 Drop to both eyes two (2) times a day., Historical Med      DULoxetine (CYMBALTA) 60 mg capsule Take 60 mg by mouth nightly. Indications: ANXIETY WITH DEPRESSION, NEUROPATHIC PAIN, Historical Med      EPINEPHrine (EPIPEN) 0.3 mg/0.3 mL (1:1,000) injection 0.3 mg by IntraMUSCular route once as needed for Anaphylaxis (Iodine/Shellfish). Indications: ANAPHYLAXIS, Historical Med                 The patient is to continue at home with home physical therapy 3 times a week to work on gait training, range of motion, strengthening, and weightbearing exercises as tolerated on the left lower extremity.  The patient is to progress from a walker to a cane to complete total weightbearing as tolerable. The patient is to continue to keep her incision dry. The patient is to followup with Dr. Marlen Holland, Blythe Landau PA-C and/or [facility] in the office approximately 10-14 days status post for x-rays and further evaluation.     HADLEY López  9/28/2021

## 2022-03-18 PROBLEM — T84.82XA ARTHROFIBROSIS OF TOTAL KNEE ARTHROPLASTY (HCC): Status: ACTIVE | Noted: 2021-09-26

## 2022-03-20 PROBLEM — K90.9 INTESTINAL MALABSORPTION: Status: ACTIVE | Noted: 2017-09-26

## 2022-03-20 PROBLEM — Z98.890 S/P GASTRIC SURGERY: Status: ACTIVE | Noted: 2017-09-26

## 2022-08-24 ENCOUNTER — DOCUMENTATION ONLY (OUTPATIENT)
Dept: SURGERY | Age: 62
End: 2022-08-24

## 2022-08-24 NOTE — PROGRESS NOTES
Per [facility] requirements;  E-mail and letter sent for follow up appointment. [employer] [facility]  [address] [place], [address] [facility] Weight Loss [place]  [facility]  [facility]      Dear Patient,    Your health is our main concern. It is important for your health to have follow-up lab work and to see your surgeon at 3 months, 6 months, 9 months and annually after your weight loss surgery. Additionally, the Department of Bariatric Surgery at our hospital is a member of the Metabolic and Bariatric Surgery Accreditation and Quality Improvement Program [facility]). As a participant in this program, we gather information on the outcomes of our patients after surgery. Please call the office for a follow up appointment at [phone]. If you have moved out of the area or have changed surgeons please call us and let us know the name of your doctor. Your health and feedback are important to us. We greatly appreciate your response.        Thank you,  [employer] [facility] [address] [facility] [place]

## 2022-09-08 ENCOUNTER — HOSPITAL ENCOUNTER (OUTPATIENT)
Dept: PREADMISSION TESTING | Age: 62
Discharge: HOME | End: 2022-09-08
Payer: MEDICARE

## 2022-09-08 ENCOUNTER — TRANSCRIBE ORDER (OUTPATIENT)
Dept: REGISTRATION | Age: 62
End: 2022-09-08

## 2022-09-08 VITALS — BODY MASS INDEX: 35.36 KG/M2 | WEIGHT: 220 LBS | HEIGHT: 66 IN

## 2022-09-08 DIAGNOSIS — T84.82XA: ICD-10-CM

## 2022-09-08 DIAGNOSIS — T84.82XA: Primary | ICD-10-CM

## 2022-09-08 LAB
ALBUMIN SERPL-MCNC: 3.4 G/DL (ref 3.4–5)
ALBUMIN/GLOB SERPL: 1 {RATIO} (ref 0.8–1.7)
ALP SERPL-CCNC: 81 U/L (ref 45–117)
ALT SERPL-CCNC: 21 U/L (ref 13–56)
ANION GAP SERPL CALC-SCNC: 3 MMOL/L (ref 3–18)
APPEARANCE UR: CLEAR
APTT PPP: 27.3 SEC (ref 23–36.4)
AST SERPL-CCNC: 18 U/L (ref 10–38)
BACTERIA URNS QL MICRO: ABNORMAL /HPF
BASOPHILS # BLD: 0.1 K/UL (ref 0–0.1)
BASOPHILS NFR BLD: 1 % (ref 0–2)
BILIRUB SERPL-MCNC: 0.3 MG/DL (ref 0.2–1)
BILIRUB UR QL: NEGATIVE
BUN SERPL-MCNC: 10 MG/DL (ref 7–18)
BUN/CREAT SERPL: 12 (ref 12–20)
CALCIUM SERPL-MCNC: 8.6 MG/DL (ref 8.5–10.1)
CHLORIDE SERPL-SCNC: 105 MMOL/L (ref 100–111)
CO2 SERPL-SCNC: 33 MMOL/L (ref 21–32)
COLOR UR: YELLOW
CREAT SERPL-MCNC: 0.85 MG/DL (ref 0.6–1.3)
DIFFERENTIAL METHOD BLD: ABNORMAL
EOSINOPHIL # BLD: 0.5 K/UL (ref 0–0.4)
EOSINOPHIL NFR BLD: 7 % (ref 0–5)
EPITH CASTS URNS QL MICRO: ABNORMAL /LPF (ref 0–5)
ERYTHROCYTE [DISTWIDTH] IN BLOOD BY AUTOMATED COUNT: 18.4 % (ref 11.6–14.5)
ERYTHROCYTE [SEDIMENTATION RATE] IN BLOOD: 85 MM/HR (ref 0–30)
GLOBULIN SER CALC-MCNC: 3.5 G/DL (ref 2–4)
GLUCOSE SERPL-MCNC: 97 MG/DL (ref 74–99)
GLUCOSE UR STRIP.AUTO-MCNC: NEGATIVE MG/DL
HBA1C MFR BLD: 6.2 % (ref 4.2–5.6)
HCT VFR BLD AUTO: 36.2 % (ref 35–45)
HGB BLD-MCNC: 11.8 G/DL (ref 12–16)
HGB UR QL STRIP: NEGATIVE
HYALINE CASTS URNS QL MICRO: ABNORMAL /LPF (ref 0–2)
IMM GRANULOCYTES # BLD AUTO: 0 K/UL (ref 0–0.04)
IMM GRANULOCYTES NFR BLD AUTO: 0 % (ref 0–0.5)
INR PPP: 0.9 (ref 0.8–1.2)
KETONES UR QL STRIP.AUTO: NEGATIVE MG/DL
LEUKOCYTE ESTERASE UR QL STRIP.AUTO: ABNORMAL
LYMPHOCYTES # BLD: 1.7 K/UL (ref 0.9–3.6)
LYMPHOCYTES NFR BLD: 23 % (ref 21–52)
MCH RBC QN AUTO: 25.3 PG (ref 24–34)
MCHC RBC AUTO-ENTMCNC: 32.6 G/DL (ref 31–37)
MCV RBC AUTO: 77.5 FL (ref 78–100)
MONOCYTES # BLD: 0.7 K/UL (ref 0.05–1.2)
MONOCYTES NFR BLD: 9 % (ref 3–10)
MUCOUS THREADS URNS QL MICRO: ABNORMAL /LPF
NEUTS SEG # BLD: 4.4 K/UL (ref 1.8–8)
NEUTS SEG NFR BLD: 60 % (ref 40–73)
NITRITE UR QL STRIP.AUTO: NEGATIVE
NRBC # BLD: 0 K/UL (ref 0–0.01)
NRBC BLD-RTO: 0 PER 100 WBC
PH UR STRIP: 5.5 [PH] (ref 5–8)
PLATELET # BLD AUTO: 280 K/UL (ref 135–420)
PMV BLD AUTO: 11.6 FL (ref 9.2–11.8)
POTASSIUM SERPL-SCNC: 3.8 MMOL/L (ref 3.5–5.5)
PROT SERPL-MCNC: 6.9 G/DL (ref 6.4–8.2)
PROT UR STRIP-MCNC: NEGATIVE MG/DL
PROTHROMBIN TIME: 12.9 SEC (ref 11.5–15.2)
RBC # BLD AUTO: 4.67 M/UL (ref 4.2–5.3)
RBC #/AREA URNS HPF: NEGATIVE /HPF (ref 0–5)
SODIUM SERPL-SCNC: 141 MMOL/L (ref 136–145)
SP GR UR REFRACTOMETRY: 1.02 (ref 1–1.03)
UROBILINOGEN UR QL STRIP.AUTO: 1 EU/DL (ref 0.2–1)
WBC # BLD AUTO: 7.3 K/UL (ref 4.6–13.2)
WBC URNS QL MICRO: ABNORMAL /HPF (ref 0–5)
YEAST URNS QL MICRO: ABNORMAL

## 2022-09-08 PROCEDURE — 85652 RBC SED RATE AUTOMATED: CPT

## 2022-09-08 PROCEDURE — 85025 COMPLETE CBC W/AUTO DIFF WBC: CPT

## 2022-09-08 PROCEDURE — 85730 THROMBOPLASTIN TIME PARTIAL: CPT

## 2022-09-08 PROCEDURE — 36415 COLL VENOUS BLD VENIPUNCTURE: CPT

## 2022-09-08 PROCEDURE — 85610 PROTHROMBIN TIME: CPT

## 2022-09-08 PROCEDURE — 81001 URINALYSIS AUTO W/SCOPE: CPT

## 2022-09-08 PROCEDURE — 80053 COMPREHEN METABOLIC PANEL: CPT

## 2022-09-08 PROCEDURE — 83036 HEMOGLOBIN GLYCOSYLATED A1C: CPT

## 2022-09-09 LAB
BACTERIA SPEC CULT: NORMAL
BACTERIA SPEC CULT: NORMAL
SERVICE CMNT-IMP: NORMAL

## 2022-09-19 ENCOUNTER — HOSPITAL ENCOUNTER (OUTPATIENT)
Dept: PREADMISSION TESTING | Age: 62
Discharge: HOME | End: 2022-09-19

## 2022-09-19 VITALS — HEIGHT: 66 IN | BODY MASS INDEX: 35.36 KG/M2 | WEIGHT: 220 LBS

## 2022-09-19 RX ORDER — CHOLECALCIFEROL (VITAMIN D3) 50 MCG
CAPSULE ORAL
COMMUNITY
End: 2022-09-27

## 2022-09-19 RX ORDER — GLUCOSAMINE/CHONDR SU A SOD 750-600 MG
1 TABLET ORAL DAILY
COMMUNITY

## 2022-09-19 RX ORDER — DICLOFENAC SODIUM 75 MG/1
TABLET, DELAYED RELEASE ORAL
COMMUNITY
End: 2022-09-27

## 2022-09-19 NOTE — PERIOP NOTES
PAT phone interview completed. Patient made aware to be NPO. Patient stated she had COVID vaccine. Patient made aware not to get COVID test. Patient made aware not to wear jewelry, lotion, oil or spray on DOS. Reviewed surgical skin preparation with patient. Patient stated understanding. Opportunity for questions given. Patient denies ERNST, PONV, MH, pseudocholinesterase deficiency, research studies or clinical trials, or prosthetics. Patient stated she does not have a DNR order. Fax sent to Cait Lewis for EKG with tracing.

## 2022-09-26 ENCOUNTER — ANESTHESIA EVENT (OUTPATIENT)
Dept: SURGERY | Age: 62
DRG: 489 | End: 2022-09-26
Payer: MEDICARE

## 2022-09-26 PROBLEM — M24.662 ARTHROFIBROSIS OF KNEE JOINT, LEFT: Chronic | Status: ACTIVE | Noted: 2022-09-26

## 2022-09-26 PROBLEM — M24.662 ARTHROFIBROSIS OF KNEE JOINT, LEFT: Status: ACTIVE | Noted: 2022-09-26

## 2022-09-26 RX ORDER — SODIUM CHLORIDE 9 MG/ML
125 INJECTION, SOLUTION INTRAVENOUS CONTINUOUS
Status: CANCELLED | OUTPATIENT
Start: 2022-09-26 | End: 2022-09-27

## 2022-09-26 RX ORDER — SODIUM CHLORIDE 9 MG/ML
300 INJECTION, SOLUTION INTRAVENOUS CONTINUOUS
Status: CANCELLED | OUTPATIENT
Start: 2022-09-26 | End: 2022-09-26

## 2022-09-26 RX ORDER — ASPIRIN 325 MG/1
325 TABLET, FILM COATED ORAL
Status: CANCELLED | OUTPATIENT
Start: 2022-09-26

## 2022-09-26 RX ORDER — OXYCODONE HYDROCHLORIDE 5 MG/1
10 TABLET ORAL
Status: CANCELLED | OUTPATIENT
Start: 2022-09-26

## 2022-09-26 RX ORDER — ACETAMINOPHEN 325 MG/1
650 TABLET ORAL EVERY 6 HOURS
Status: CANCELLED | OUTPATIENT
Start: 2022-09-26

## 2022-09-26 RX ORDER — LANOLIN ALCOHOL/MO/W.PET/CERES
1 CREAM (GRAM) TOPICAL 3 TIMES DAILY
Status: CANCELLED | OUTPATIENT
Start: 2022-09-26

## 2022-09-26 RX ORDER — SODIUM CHLORIDE 0.9 % (FLUSH) 0.9 %
5-40 SYRINGE (ML) INJECTION EVERY 8 HOURS
Status: CANCELLED | OUTPATIENT
Start: 2022-09-26

## 2022-09-26 RX ORDER — NALOXONE HYDROCHLORIDE 0.4 MG/ML
0.4 INJECTION, SOLUTION INTRAMUSCULAR; INTRAVENOUS; SUBCUTANEOUS AS NEEDED
Status: CANCELLED | OUTPATIENT
Start: 2022-09-26

## 2022-09-26 RX ORDER — DOCUSATE SODIUM 100 MG/1
100 CAPSULE, LIQUID FILLED ORAL DAILY
Status: CANCELLED | OUTPATIENT
Start: 2022-09-27

## 2022-09-26 RX ORDER — CEFAZOLIN SODIUM/WATER 2 G/20 ML
2 SYRINGE (ML) INTRAVENOUS EVERY 8 HOURS
Status: CANCELLED | OUTPATIENT
Start: 2022-09-26 | End: 2022-09-27

## 2022-09-26 RX ORDER — SODIUM CHLORIDE 0.9 % (FLUSH) 0.9 %
5-40 SYRINGE (ML) INJECTION AS NEEDED
Status: CANCELLED | OUTPATIENT
Start: 2022-09-26

## 2022-09-26 NOTE — H&P
[address] [facility] [address] Medicine  History and Physical Exam    Patient: Claribel Moraes MRN: [id number]  SSN: [id number]    YOB: 1960  Age: 57 y.o. Sex: female      Subjective:      Chief Complaint: Left knee pain    History of Present Illness:  Patient complains of pain and stiffness to the left knee and difficulty ambulating, which has progressively worsened over several months. She is s/p left total knee replacement in April 2021 followed by manipulation under anesthesia in September of 2021. Unfortunately she has continued to have issues with range of motion. The patient's pain has persisted and progressed despite conservative treatments and therapies. The patient has been previously treated with medications and/or injections, a brace, and physical therapy. The patient has at this time opted for surgical intervention.        Past Medical History:   Diagnosis Date    Adverse effect of anesthesia     small airway, difficult intubation, pt requesting anesthesia consult    Arthrofibrosis of knee joint, left 9/26/2022    Arthrofibrosis of total knee arthroplasty ([address] 09/26/2021    Asthma     seasonal; no inhaler    Borderline diabetes     Chronic pain     lower back, arms and legs    Depression     Diabetes ([address]     diet control and exercise    Difficult intubation     has a short neck    GERD (gastroesophageal reflux disease)     H/O seasonal allergies     Hypertension     Ill-defined condition     MS    Morbid obesity ([address]     Morbid obesity with BMI of 40.0-44.9, adult ([address]     Multiple sclerosis ([address] 2008    Osteoarthritis of left knee 03/24/2016    Osteoarthritis of right knee     Overactive bladder     Thyroid cancer ([address] 2016     Past Surgical History:   Procedure Laterality Date     [address]    HX GASTRIC BYPASS  2017    gastric sleeve    HX HERNIA REPAIR      umbilical x2    HX HYSTERECTOMY  2004    RYNE    HX KNEE REPLACEMENT Left 2021    HX KNEE REPLACEMENT Left     HX LAP CHOLECYSTECTOMY      [address]    HX ORTHOPAEDIC Bilateral     partial knee replacement    HX ORTHOPAEDIC Left     knee \"ligament repair\"    HX OTHER SURGICAL      fatty tumor removal left arm    HX THYROIDECTOMY  2016    HX TONSILLECTOMY      [place] LAP, RADHA RESTRICT PROC, LONGITUDINAL GASTRECTOMY      2017     Social History     Occupational History    Not on file   Tobacco Use    Smoking status: Former     Types: Cigarettes     Quit date: 3/9/2002     Years since quittin.5    Smokeless tobacco: Never   Vaping Use    Vaping Use: Never used   Substance and Sexual Activity    Alcohol use: Yes     Comment: one to two glasses of wine a month    Drug use: Yes     Frequency: 2.0 times per week     Types: Marijuana     Comment: edibles; patient advised to hold for 7 days prior to DOS. Sexual activity: Yes     Prior to Admission medications    Medication Sig Start Date End Date Taking? Authorizing Provider   omega 3-dha-epa-fish oil (Fish OiL) 100-160-1,000 mg cap Take  by mouth. Provider, Historical   CRANBERRY PO Take  by mouth. Provider, Historical   Biotin 2,500 mcg cap Take  by mouth. Provider, Historical   diclofenac EC (VOLTAREN) 75 mg EC tablet Take  by mouth. Provider, Historical   telmisartan (MICARDIS) 20 mg tablet Take 20 mg by mouth nightly. Provider, Historical   fluticasone propionate (FLONASE NA) by Nasal route. Provider, Historical   atorvastatin (LIPITOR) 10 mg tablet Take 10 mg by mouth nightly. Provider, Historical   pantoprazole (PROTONIX) 20 mg tablet Take 20 mg by mouth nightly. Provider, Historical   ocrelizumab (OCREVUS) 30 mg/mL soln injection by IntraVENous route. q6months    Provider, Historical   cyanocobalamin 1,000 mcg tablet Take 500 mcg by mouth every seven (7) days. Provider, Historical   calcium-cholecalciferol, d3, 600-125 mg-unit tab Take 600 mg by mouth daily.     Provider, Historical   cholecalciferol (VITAMIN D3) 25 mcg (1,000 unit) cap Take 1,000 Units by mouth daily. Provider, Historical   b complex vitamins tablet Take 1 Tab by mouth daily. Provider, Historical   levothyroxine (SYNTHROID) 112 mcg tablet Take 88 mcg by mouth Daily (before breakfast). Indications: additional treatment for thyroid cancer    Provider, Historical   acetaminophen (TYLENOL) 500 mg tablet Take 500 mg by mouth every six (6) hours as needed for Pain. Provider, Historical   multivitamin (ONE A DAY) tablet Take 1 Tab by mouth daily. Provider, Historical   gabapentin (NEURONTIN) 300 mg capsule Take 600 mg by mouth two (2) times a day. Provider, Historical   baclofen (LIORESAL) 10 mg tablet Take 10 mg by mouth two (2) times a day. Indications: MS    Provider, Historical   cycloSPORINE (RESTASIS) 0.05 % ophthalmic emulsion Administer 1 Drop to both eyes two (2) times a day. Provider, Historical   EPINEPHrine (EPIPEN) 0.3 mg/0.3 mL (1:1,000) injection 0.3 mg by IntraMUSCular route once as needed for Anaphylaxis (Iodine/Shellfish). Indications: ANAPHYLAXIS    Provider, Historical   DULoxetine (CYMBALTA) 60 mg capsule Take 60 mg by mouth nightly. Indications: ANXIETY WITH DEPRESSION, NEUROPATHIC PAIN    Provider, Historical       Allergies: Allergies   Allergen Reactions    Shellfish Derived Anaphylaxis, Shortness of Breath and Swelling     Able to use topical betadine w/out issues. Adhesive Tape-Silicones Other (comments)     Skin peels and blister    Dilaudid [Hydromorphone] Other (comments)     \"uncontrollable crying\"        Review of Systems:  A comprehensive review of systems was negative except for that written in the History of Present Illness. Objective:       Physical Exam:  HEENT: Normocephalic, atraumatic  Lungs:  Clear to auscultation  Heart:   Regular rate and rhythm  Abdomen: Soft  Extremities:  Pain with range of motion of the left knee. ROM limited to 5-110. Tenderness generalized. Crepitus present. Antalgic gait. Assessment:      Arthrofibrosis the left knee after arthroplasty    Plan:       Proceed with scheduled REVISION LEFT KNEE ARTHROPLASTY WITH SYNOVECTOMY AND POLY EXCHANGE. The patient would benefit from the use of Vancomycin for antibiotic prophylaxis due to increased risk of infection. The various methods of treatment have been discussed with the patient and family. After consideration of risks, benefits, and other options for treatment, the patient has consented to surgical interventions. Questions were answered and preoperative teaching was done by Dr Claudette Downs.      Signed By: HADLEY Clancy     September 26, 2022

## 2022-09-27 ENCOUNTER — HOME HEALTH ADMISSION (OUTPATIENT)
Dept: HOME HEALTH SERVICES | Facility: HOME HEALTH | Age: 62
End: 2022-09-27
Payer: MEDICARE

## 2022-09-27 ENCOUNTER — APPOINTMENT (OUTPATIENT)
Dept: GENERAL RADIOLOGY | Age: 62
DRG: 489 | End: 2022-09-27
Attending: PHYSICIAN ASSISTANT
Payer: MEDICARE

## 2022-09-27 ENCOUNTER — ANESTHESIA (OUTPATIENT)
Dept: SURGERY | Age: 62
DRG: 489 | End: 2022-09-27
Payer: MEDICARE

## 2022-09-27 ENCOUNTER — HOSPITAL ENCOUNTER (INPATIENT)
Age: 62
LOS: 1 days | Discharge: HOME HEALTH | DRG: 489 | End: 2022-09-27
Attending: ORTHOPAEDIC SURGERY | Admitting: ORTHOPAEDIC SURGERY
Payer: MEDICARE

## 2022-09-27 VITALS
RESPIRATION RATE: 18 BRPM | OXYGEN SATURATION: 97 % | SYSTOLIC BLOOD PRESSURE: 177 MMHG | WEIGHT: 222.1 LBS | HEART RATE: 87 BPM | BODY MASS INDEX: 35.69 KG/M2 | TEMPERATURE: 97.6 F | DIASTOLIC BLOOD PRESSURE: 65 MMHG | HEIGHT: 66 IN

## 2022-09-27 DIAGNOSIS — M24.662 ARTHROFIBROSIS OF KNEE JOINT, LEFT: Primary | Chronic | ICD-10-CM

## 2022-09-27 DIAGNOSIS — T84.82XD ARTHROFIBROSIS OF TOTAL KNEE REPLACEMENT, SUBSEQUENT ENCOUNTER: Chronic | ICD-10-CM

## 2022-09-27 LAB
ABO + RH BLD: NORMAL
BLOOD GROUP ANTIBODIES SERPL: NORMAL
GLUCOSE BLD STRIP.AUTO-MCNC: 90 MG/DL (ref 70–110)
SPECIMEN EXP DATE BLD: NORMAL

## 2022-09-27 PROCEDURE — 76010000149 HC OR TIME 1 TO 1.5 HR: Performed by: ORTHOPAEDIC SURGERY

## 2022-09-27 PROCEDURE — 64447 NJX AA&/STRD FEMORAL NRV IMG: CPT | Performed by: ORTHOPAEDIC SURGERY

## 2022-09-27 PROCEDURE — 77030039760: Performed by: ORTHOPAEDIC SURGERY

## 2022-09-27 PROCEDURE — 77030013708 HC HNDPC SUC IRR PULS STRY –B: Performed by: ORTHOPAEDIC SURGERY

## 2022-09-27 PROCEDURE — 77030018835 HC SOL IRR LR ICUM -A: Performed by: ORTHOPAEDIC SURGERY

## 2022-09-27 PROCEDURE — 97116 GAIT TRAINING THERAPY: CPT

## 2022-09-27 PROCEDURE — 36415 COLL VENOUS BLD VENIPUNCTURE: CPT

## 2022-09-27 PROCEDURE — 77030037713 HC CLOSR DEV INCIS ZIP STRY -B: Performed by: ORTHOPAEDIC SURGERY

## 2022-09-27 PROCEDURE — 2709999900 HC NON-CHARGEABLE SUPPLY: Performed by: ORTHOPAEDIC SURGERY

## 2022-09-27 PROCEDURE — 74011250637 HC RX REV CODE- 250/637: Performed by: PHYSICIAN ASSISTANT

## 2022-09-27 PROCEDURE — 76210000063 HC OR PH I REC FIRST 0.5 HR: Performed by: ORTHOPAEDIC SURGERY

## 2022-09-27 PROCEDURE — 77030027138 HC INCENT SPIROMETER -A: Performed by: ORTHOPAEDIC SURGERY

## 2022-09-27 PROCEDURE — 77030012893: Performed by: ORTHOPAEDIC SURGERY

## 2022-09-27 PROCEDURE — 74011250636 HC RX REV CODE- 250/636: Performed by: ORTHOPAEDIC SURGERY

## 2022-09-27 PROCEDURE — 97165 OT EVAL LOW COMPLEX 30 MIN: CPT

## 2022-09-27 PROCEDURE — 74011250636 HC RX REV CODE- 250/636: Performed by: NURSE ANESTHETIST, CERTIFIED REGISTERED

## 2022-09-27 PROCEDURE — 74011000272 HC RX REV CODE- 272: Performed by: ORTHOPAEDIC SURGERY

## 2022-09-27 PROCEDURE — 76060000033 HC ANESTHESIA 1 TO 1.5 HR: Performed by: ORTHOPAEDIC SURGERY

## 2022-09-27 PROCEDURE — 76942 ECHO GUIDE FOR BIOPSY: CPT | Performed by: ORTHOPAEDIC SURGERY

## 2022-09-27 PROCEDURE — 65270000029 HC RM PRIVATE

## 2022-09-27 PROCEDURE — 74011000250 HC RX REV CODE- 250: Performed by: ORTHOPAEDIC SURGERY

## 2022-09-27 PROCEDURE — 76210000022 HC REC RM PH II 1.5 TO 2 HR: Performed by: ORTHOPAEDIC SURGERY

## 2022-09-27 PROCEDURE — 0SUW09Z SUPPLEMENT LEFT KNEE JOINT, TIBIAL SURFACE WITH LINER, OPEN APPROACH: ICD-10-PCS | Performed by: ORTHOPAEDIC SURGERY

## 2022-09-27 PROCEDURE — 73560 X-RAY EXAM OF KNEE 1 OR 2: CPT

## 2022-09-27 PROCEDURE — 74011000250 HC RX REV CODE- 250: Performed by: NURSE ANESTHETIST, CERTIFIED REGISTERED

## 2022-09-27 PROCEDURE — 74011000250 HC RX REV CODE- 250: Performed by: ANESTHESIOLOGY

## 2022-09-27 PROCEDURE — 77030038010: Performed by: ORTHOPAEDIC SURGERY

## 2022-09-27 PROCEDURE — 64450 NJX AA&/STRD OTHER PN/BRANCH: CPT | Performed by: ORTHOPAEDIC SURGERY

## 2022-09-27 PROCEDURE — 77030002934 HC SUT MCRYL J&J -B: Performed by: ORTHOPAEDIC SURGERY

## 2022-09-27 PROCEDURE — 86900 BLOOD TYPING SEROLOGIC ABO: CPT

## 2022-09-27 PROCEDURE — 74011250636 HC RX REV CODE- 250/636: Performed by: PHYSICIAN ASSISTANT

## 2022-09-27 PROCEDURE — 77030020782 HC GWN BAIR PAWS FLX 3M -B: Performed by: ORTHOPAEDIC SURGERY

## 2022-09-27 PROCEDURE — 77030014144 HC TY SPN ANES BBMI -B: Performed by: ANESTHESIOLOGY

## 2022-09-27 PROCEDURE — 74011000258 HC RX REV CODE- 258: Performed by: ORTHOPAEDIC SURGERY

## 2022-09-27 PROCEDURE — 77030003666 HC NDL SPINAL BD -A: Performed by: ORTHOPAEDIC SURGERY

## 2022-09-27 PROCEDURE — 77030031139 HC SUT VCRL2 J&J -A: Performed by: ORTHOPAEDIC SURGERY

## 2022-09-27 PROCEDURE — 97161 PT EVAL LOW COMPLEX 20 MIN: CPT

## 2022-09-27 PROCEDURE — 77030033263 HC DRSG MEPILEX 16-48IN BORD MOLN -B: Performed by: ORTHOPAEDIC SURGERY

## 2022-09-27 PROCEDURE — C1776 JOINT DEVICE (IMPLANTABLE): HCPCS | Performed by: ORTHOPAEDIC SURGERY

## 2022-09-27 PROCEDURE — 0SPD09Z REMOVAL OF LINER FROM LEFT KNEE JOINT, OPEN APPROACH: ICD-10-PCS | Performed by: ORTHOPAEDIC SURGERY

## 2022-09-27 PROCEDURE — 74011250636 HC RX REV CODE- 250/636: Performed by: ANESTHESIOLOGY

## 2022-09-27 PROCEDURE — 82962 GLUCOSE BLOOD TEST: CPT

## 2022-09-27 PROCEDURE — 0MNP0ZZ RELEASE LEFT KNEE BURSA AND LIGAMENT, OPEN APPROACH: ICD-10-PCS | Performed by: ORTHOPAEDIC SURGERY

## 2022-09-27 PROCEDURE — 74011250637 HC RX REV CODE- 250/637: Performed by: ORTHOPAEDIC SURGERY

## 2022-09-27 PROCEDURE — 97535 SELF CARE MNGMENT TRAINING: CPT

## 2022-09-27 PROCEDURE — 77030036563 HC WRP CLD THER KNE S2SG -B: Performed by: ORTHOPAEDIC SURGERY

## 2022-09-27 DEVICE — SIZE 4 8MM TIBIAL INSERT UC
Type: IMPLANTABLE DEVICE | Site: KNEE | Status: FUNCTIONAL
Brand: BKS E-VITALIZE

## 2022-09-27 RX ORDER — FENTANYL CITRATE 50 UG/ML
25 INJECTION, SOLUTION INTRAMUSCULAR; INTRAVENOUS AS NEEDED
Status: DISCONTINUED | OUTPATIENT
Start: 2022-09-27 | End: 2022-09-27 | Stop reason: HOSPADM

## 2022-09-27 RX ORDER — PROPOFOL 10 MG/ML
INJECTION, EMULSION INTRAVENOUS
Status: DISCONTINUED | OUTPATIENT
Start: 2022-09-27 | End: 2022-09-27 | Stop reason: HOSPADM

## 2022-09-27 RX ORDER — OXYCODONE HYDROCHLORIDE 5 MG/1
5 TABLET ORAL
Qty: 42 TABLET | Refills: 0 | Status: SHIPPED | OUTPATIENT
Start: 2022-09-27 | End: 2022-10-04

## 2022-09-27 RX ORDER — ACETAMINOPHEN 500 MG
1000 TABLET ORAL ONCE
Status: COMPLETED | OUTPATIENT
Start: 2022-09-27 | End: 2022-09-27

## 2022-09-27 RX ORDER — CEFADROXIL 500 MG/1
500 CAPSULE ORAL 2 TIMES DAILY
Qty: 10 CAPSULE | Refills: 0 | Status: SHIPPED | OUTPATIENT
Start: 2022-09-27 | End: 2022-10-02

## 2022-09-27 RX ORDER — DEXAMETHASONE SODIUM PHOSPHATE 10 MG/ML
INJECTION INTRAMUSCULAR; INTRAVENOUS
Status: SHIPPED | OUTPATIENT
Start: 2022-09-27 | End: 2022-09-27

## 2022-09-27 RX ORDER — DIPHENHYDRAMINE HYDROCHLORIDE 50 MG/ML
12.5 INJECTION, SOLUTION INTRAMUSCULAR; INTRAVENOUS
Status: DISCONTINUED | OUTPATIENT
Start: 2022-09-27 | End: 2022-09-27 | Stop reason: HOSPADM

## 2022-09-27 RX ORDER — OXYCODONE HYDROCHLORIDE 5 MG/1
5 TABLET ORAL
Status: DISCONTINUED | OUTPATIENT
Start: 2022-09-27 | End: 2022-09-27 | Stop reason: HOSPADM

## 2022-09-27 RX ORDER — SODIUM CHLORIDE 0.9 % (FLUSH) 0.9 %
5-40 SYRINGE (ML) INJECTION AS NEEDED
Status: DISCONTINUED | OUTPATIENT
Start: 2022-09-27 | End: 2022-09-27 | Stop reason: HOSPADM

## 2022-09-27 RX ORDER — TRANEXAMIC ACID 650 1/1
1950 TABLET ORAL
Status: COMPLETED | OUTPATIENT
Start: 2022-09-27 | End: 2022-09-27

## 2022-09-27 RX ORDER — DEXMEDETOMIDINE HYDROCHLORIDE 100 UG/ML
INJECTION, SOLUTION INTRAVENOUS
Status: SHIPPED | OUTPATIENT
Start: 2022-09-27 | End: 2022-09-27

## 2022-09-27 RX ORDER — MIDAZOLAM HYDROCHLORIDE 1 MG/ML
INJECTION, SOLUTION INTRAMUSCULAR; INTRAVENOUS AS NEEDED
Status: DISCONTINUED | OUTPATIENT
Start: 2022-09-27 | End: 2022-09-27 | Stop reason: HOSPADM

## 2022-09-27 RX ORDER — CEFAZOLIN SODIUM/WATER 2 G/20 ML
2 SYRINGE (ML) INTRAVENOUS ONCE
Status: COMPLETED | OUTPATIENT
Start: 2022-09-27 | End: 2022-09-27

## 2022-09-27 RX ORDER — MAGNESIUM SULFATE 100 %
4 CRYSTALS MISCELLANEOUS AS NEEDED
Status: DISCONTINUED | OUTPATIENT
Start: 2022-09-27 | End: 2022-09-27 | Stop reason: HOSPADM

## 2022-09-27 RX ORDER — PANTOPRAZOLE SODIUM 40 MG/1
40 TABLET, DELAYED RELEASE ORAL DAILY
Status: DISCONTINUED | OUTPATIENT
Start: 2022-09-27 | End: 2022-09-27 | Stop reason: HOSPADM

## 2022-09-27 RX ORDER — PANTOPRAZOLE SODIUM 40 MG/1
40 TABLET, DELAYED RELEASE ORAL DAILY
Status: DISCONTINUED | OUTPATIENT
Start: 2022-09-27 | End: 2022-09-27

## 2022-09-27 RX ORDER — INSULIN LISPRO 100 [IU]/ML
INJECTION, SOLUTION INTRAVENOUS; SUBCUTANEOUS ONCE
Status: DISCONTINUED | OUTPATIENT
Start: 2022-09-27 | End: 2022-09-27 | Stop reason: HOSPADM

## 2022-09-27 RX ORDER — SODIUM CHLORIDE 0.9 % (FLUSH) 0.9 %
5-40 SYRINGE (ML) INJECTION EVERY 8 HOURS
Status: DISCONTINUED | OUTPATIENT
Start: 2022-09-27 | End: 2022-09-27 | Stop reason: HOSPADM

## 2022-09-27 RX ORDER — SODIUM CHLORIDE, SODIUM LACTATE, POTASSIUM CHLORIDE, CALCIUM CHLORIDE 600; 310; 30; 20 MG/100ML; MG/100ML; MG/100ML; MG/100ML
75 INJECTION, SOLUTION INTRAVENOUS CONTINUOUS
Status: DISCONTINUED | OUTPATIENT
Start: 2022-09-27 | End: 2022-09-27 | Stop reason: HOSPADM

## 2022-09-27 RX ORDER — ALBUTEROL SULFATE 0.83 MG/ML
2.5 SOLUTION RESPIRATORY (INHALATION) AS NEEDED
Status: DISCONTINUED | OUTPATIENT
Start: 2022-09-27 | End: 2022-09-27 | Stop reason: HOSPADM

## 2022-09-27 RX ORDER — SODIUM CHLORIDE, SODIUM LACTATE, POTASSIUM CHLORIDE, CALCIUM CHLORIDE 600; 310; 30; 20 MG/100ML; MG/100ML; MG/100ML; MG/100ML
125 INJECTION, SOLUTION INTRAVENOUS CONTINUOUS
Status: DISCONTINUED | OUTPATIENT
Start: 2022-09-27 | End: 2022-09-27 | Stop reason: HOSPADM

## 2022-09-27 RX ORDER — ROPIVACAINE HYDROCHLORIDE 5 MG/ML
INJECTION, SOLUTION EPIDURAL; INFILTRATION; PERINEURAL
Status: SHIPPED | OUTPATIENT
Start: 2022-09-27 | End: 2022-09-27

## 2022-09-27 RX ORDER — ASPIRIN 325 MG
325 TABLET ORAL 2 TIMES DAILY
Qty: 42 TABLET | Refills: 0 | Status: SHIPPED | OUTPATIENT
Start: 2022-09-27 | End: 2022-09-27

## 2022-09-27 RX ORDER — KETOROLAC TROMETHAMINE 30 MG/ML
15 INJECTION, SOLUTION INTRAMUSCULAR; INTRAVENOUS EVERY 6 HOURS
Status: DISCONTINUED | OUTPATIENT
Start: 2022-09-27 | End: 2022-09-27 | Stop reason: HOSPADM

## 2022-09-27 RX ORDER — KETAMINE HYDROCHLORIDE 10 MG/ML
INJECTION, SOLUTION INTRAMUSCULAR; INTRAVENOUS AS NEEDED
Status: DISCONTINUED | OUTPATIENT
Start: 2022-09-27 | End: 2022-09-27 | Stop reason: HOSPADM

## 2022-09-27 RX ORDER — ONDANSETRON 2 MG/ML
4 INJECTION INTRAMUSCULAR; INTRAVENOUS
Status: DISCONTINUED | OUTPATIENT
Start: 2022-09-27 | End: 2022-09-27 | Stop reason: HOSPADM

## 2022-09-27 RX ORDER — LIDOCAINE HYDROCHLORIDE 20 MG/ML
INJECTION, SOLUTION EPIDURAL; INFILTRATION; INTRACAUDAL; PERINEURAL AS NEEDED
Status: DISCONTINUED | OUTPATIENT
Start: 2022-09-27 | End: 2022-09-27 | Stop reason: HOSPADM

## 2022-09-27 RX ORDER — FENTANYL CITRATE 50 UG/ML
50 INJECTION, SOLUTION INTRAMUSCULAR; INTRAVENOUS
Status: DISCONTINUED | OUTPATIENT
Start: 2022-09-27 | End: 2022-09-27 | Stop reason: HOSPADM

## 2022-09-27 RX ORDER — VANCOMYCIN/0.9 % SOD CHLORIDE 1.5G/250ML
1500 PLASTIC BAG, INJECTION (ML) INTRAVENOUS ONCE
Status: COMPLETED | OUTPATIENT
Start: 2022-09-27 | End: 2022-09-27

## 2022-09-27 RX ORDER — GLYCOPYRROLATE 0.2 MG/ML
INJECTION INTRAMUSCULAR; INTRAVENOUS AS NEEDED
Status: DISCONTINUED | OUTPATIENT
Start: 2022-09-27 | End: 2022-09-27 | Stop reason: HOSPADM

## 2022-09-27 RX ADMIN — DEXAMETHASONE SODIUM PHOSPHATE 4 MG: 10 INJECTION, SOLUTION INTRAMUSCULAR; INTRAVENOUS at 07:28

## 2022-09-27 RX ADMIN — OXYCODONE 5 MG: 5 TABLET ORAL at 10:39

## 2022-09-27 RX ADMIN — ACETAMINOPHEN 1000 MG: 500 TABLET ORAL at 06:24

## 2022-09-27 RX ADMIN — KETAMINE HYDROCHLORIDE 50 MG: 10 INJECTION, SOLUTION INTRAMUSCULAR; INTRAVENOUS at 08:25

## 2022-09-27 RX ADMIN — MIDAZOLAM 2 MG: 1 INJECTION INTRAMUSCULAR; INTRAVENOUS at 08:07

## 2022-09-27 RX ADMIN — PANTOPRAZOLE SODIUM 40 MG: 40 TABLET, DELAYED RELEASE ORAL at 06:29

## 2022-09-27 RX ADMIN — ROPIVACAINE HYDROCHLORIDE 25 ML: 5 INJECTION, SOLUTION EPIDURAL; INFILTRATION; PERINEURAL at 07:28

## 2022-09-27 RX ADMIN — TRANEXAMIC ACID 1950 MG: 650 TABLET ORAL at 06:24

## 2022-09-27 RX ADMIN — CEFAZOLIN 2 G: 10 INJECTION, POWDER, FOR SOLUTION INTRAVENOUS at 07:32

## 2022-09-27 RX ADMIN — PROPOFOL 100 MCG/KG/MIN: 10 INJECTION, EMULSION INTRAVENOUS at 08:25

## 2022-09-27 RX ADMIN — MEPIVACAINE HYDROCHLORIDE 45 MG: 15 INJECTION, SOLUTION EPIDURAL; INFILTRATION at 08:17

## 2022-09-27 RX ADMIN — MIDAZOLAM 2 MG: 1 INJECTION INTRAMUSCULAR; INTRAVENOUS at 07:25

## 2022-09-27 RX ADMIN — SODIUM CHLORIDE, SODIUM LACTATE, POTASSIUM CHLORIDE, AND CALCIUM CHLORIDE 1000 ML: 600; 310; 30; 20 INJECTION, SOLUTION INTRAVENOUS at 06:25

## 2022-09-27 RX ADMIN — LIDOCAINE HYDROCHLORIDE 80 MG: 20 INJECTION, SOLUTION INTRAVENOUS at 08:25

## 2022-09-27 RX ADMIN — DEXMEDETOMIDINE HYDROCHLORIDE 20 MCG: 100 INJECTION, SOLUTION INTRAVENOUS at 07:28

## 2022-09-27 RX ADMIN — SODIUM CHLORIDE, SODIUM LACTATE, POTASSIUM CHLORIDE, AND CALCIUM CHLORIDE 125 ML/HR: 600; 310; 30; 20 INJECTION, SOLUTION INTRAVENOUS at 06:25

## 2022-09-27 RX ADMIN — VANCOMYCIN HYDROCHLORIDE 1500 MG: 10 INJECTION, POWDER, LYOPHILIZED, FOR SOLUTION INTRAVENOUS at 07:34

## 2022-09-27 RX ADMIN — GLYCOPYRROLATE 0.2 MG: 0.2 INJECTION INTRAMUSCULAR; INTRAVENOUS at 08:07

## 2022-09-27 NOTE — PERIOP NOTES
TRANSFER - IN REPORT:    Verbal report received from Diandra Tobar, RN & CRNA (name) on Kavin Favorite  being received from OR (unit) for routine progression of care      Report consisted of patients Situation, Background, Assessment and   Recommendations(SBAR). Information from the following report(s) SBAR, Kardex, OR Summary, Procedure Summary, Intake/Output, and MAR was reviewed with the receiving nurse. Opportunity for questions and clarification was provided. Assessment completed upon patients arrival to unit and care assumed.

## 2022-09-27 NOTE — PROGRESS NOTES
Problem: Mobility Impaired (Adult and Pediatric)  Goal: *Acute Goals and Plan of Care (Insert Text)  Description: PT goals to be met in 1 day:  Pt will be able to perform supine<>sit SBA for transfers at home. Pt will be able to perform sit<>stand SBA for increased ability to transfer at home safely. Pt will be able to participate in gt training >100' w/ RW, WBAT, GB and CGA/SBA for improved ability in home upon d/c. Pt will be educated regarding HEP per MD protocol for optimal AROM/strength outcomes. Note: [x]  Patient has met MD mobilization critieria for d/c home   [x]  Recommend HH with 24 hour adult care   []  Benefit from additional acute PT session to address:      PHYSICAL THERAPY EVALUATION    Patient: Kerri Estes (57 y.o. female)  Date: 9/27/2022  Primary Diagnosis: Arthrofibrosis of knee joint, left [M24.662]  Procedure(s) (LRB):  REVISION OF LEFT KNEE REPLACEMENT (OPEN SYNOVECTOMY & POLY EXCHANGE) (Left) Day of Surgery   Precautions:   Fall, WBAT  PLOF: Independent w/ use of SPC    ASSESSMENT :  Based on the objective data described below, decreased mobility in regards to bed mobility, transfers, gt quality and tolerance, balance, stair negotiation and safety due to L TKA rev surgery. Decreased AROM of L knee, dec strength of L knee, pain in L knee, dec sensation of L knee also impacting pt functional mobility. Pt rating pain on numerical pain scale pre/post and during session 1/10. Pt and caregiver ed regarding mobility safety, WB, HEP, ice application/use, elevation, environmental safety and home safe techniques. Pt supine on stretcher upon arrival.  Pt able to perform supine>sit SBA and sit<>stand w/ CGA/SBA. Safety vc required throughout session to reinforce safety. Pt able to participate in gt training using RW, GB, WBAT, and CGA w/ antalgic gt pattern. Pt ed on half step entry into home and pt has correct strategy.   Answered questions by pt and caregiver in regards to PT and mobility. Pt left sitting in recliner w/ all needs within reach and ice pack to L knee. Nurse Carmen Brewster aware of session and outcomes. Recommend HHPT with responsible adult care at least 24 hours upon hospital d/c. Patient will benefit from skilled intervention to address the above impairments. Patient's rehabilitation potential is considered to be Good  Factors which may influence rehabilitation potential include:   []         None noted  []         Mental ability/status  []         Medical condition  []         Home/family situation and support systems  []         Safety awareness  [x]         Pain tolerance/management  []         Other:      PLAN :  Recommendations and Planned Interventions:   [x]           Bed Mobility Training             []    Neuromuscular Re-Education  [x]           Transfer Training                   []    Orthotic/Prosthetic Training  [x]           Gait Training                          [x]    Modalities  [x]           Therapeutic Exercises           [x]    Edema Management/Control  [x]           Therapeutic Activities            [x]    Family Training/Education  [x]           Patient Education  []           Other (comment):    Frequency/Duration: Patient will be followed by physical therapy 1-2 times per day/4-7 days per week to address goals. Discharge Recommendations: Home Health  Further Equipment Recommendations for Discharge: N/A    AMPAC: 18/24    This AMPAC score should be considered in conjunction with interdisciplinary team recommendations to determine the most appropriate discharge setting. Patient's social support, diagnosis, medical stability, and prior level of function should also be taken into consideration. SUBJECTIVE:   Patient stated I feel like I need to use the bathroom soon. I don't know why I can't lift the leg yet.     OBJECTIVE DATA SUMMARY:     Past Medical History:   Diagnosis Date    Adverse effect of anesthesia     small airway, difficult intubation, pt requesting anesthesia consult    Arthrofibrosis of knee joint, left 9/26/2022    Arthrofibrosis of total knee arthroplasty ([facility] 09/26/2021    Asthma     seasonal; no inhaler    Borderline diabetes     Chronic pain     lower back, arms and legs    Depression     Diabetes ([facility]     diet control and exercise    Difficult intubation     has a short neck    GERD (gastroesophageal reflux disease)     H/O seasonal allergies     Hypertension     Ill-defined condition     MS    Morbid obesity ([facility]     Morbid obesity with BMI of 40.0-44.9, adult ([facility]     Multiple sclerosis ([facility] 2008    Osteoarthritis of left knee 03/24/2016    Osteoarthritis of right knee     Overactive bladder     Thyroid cancer ([facility] 2016     Past Surgical History:   Procedure Laterality Date     [address]    HX GASTRIC BYPASS  2017    gastric sleeve    HX HERNIA REPAIR      umbilical x2    HX HYSTERECTOMY  2004    [facility]    HX KNEE REPLACEMENT Left 2021    HX KNEE REPLACEMENT Left     HX LAP CHOLECYSTECTOMY      2004- [address]    HX ORTHOPAEDIC Bilateral     partial knee replacement    HX ORTHOPAEDIC Left     knee \"ligament repair\"    HX OTHER SURGICAL      fatty tumor removal left arm    HX THYROIDECTOMY  2016    HX TONSILLECTOMY      [place] LAP, RADHA RESTRICT PROC, LONGITUDINAL GASTRECTOMY      9/12/2017     Barriers to Learning/Limitations: yes;  other anesthesia  Compensate with: Visual Cues, Verbal Cues, Tactile Cues, and Kinesthetic Cues  Home Situation:  Home Situation  Home Environment: Private residence  # Steps to Enter: 1  One/Two Story Residence: Two story (stair lift)  Lift Chair Available: Yes  Living Alone: No  Support Systems: Child(gen), Spouse/Significant Other  Patient Expects to be Discharged to[de-identified] Home with home health  Current DME Used/Available at Home: Ronnald Real, rolling, Cane, straight  Tub or Shower Type: Shower  Critical Behavior:  Neurologic State: Alert  Orientation Level: Oriented to person;Oriented to place;Oriented to situation  Cognition: Follows commands  Safety/Judgement: Awareness of environment  Psychosocial  Patient Behaviors: Calm; Cooperative  Family  Behaviors: Calm  Family  Behaviors: Calm  Skin Integrity: Incision (comment) (left knee)  Skin Integumentary  Skin Integrity: Incision (comment) (left knee)  Strength:    Strength: Generally decreased, functional  Tone & Sensation:   Tone: Normal  Sensation: Impaired (L knee)  Range Of Motion:  AROM: Generally decreased, functional  PROM: Generally decreased, functional  Posture:  Functional Mobility:  Bed Mobility:  Supine to Sit: Stand-by assistance  Scooting: Stand-by assistance  Transfers:  Sit to Stand: Contact guard assistance (vc)  Stand to Sit: Contact guard assistance;Stand-by assistance (vc)  Balance:   Sitting: Intact  Standing: Intact; With support  Ambulation/Gait Training:  Distance (ft): 100 Feet (ft)  Assistive Device: Walker, rolling;Gait belt  Ambulation - Level of Assistance: Contact guard assistance (vc)  Gait Abnormalities: Antalgic;Decreased step clearance; Step to gait  Left Side Weight Bearing: As tolerated  Base of Support: Shift to right  Stance: Left decreased  Speed/Dorene: Slow  Step Length: Left shortened;Right shortened  Swing Pattern: Left asymmetrical;Right asymmetrical  Interventions: Safety awareness training; Tactile cues; Verbal cues; Visual/Demos  Stairs: Therapeutic Exercises:   Encouraged HEP  Pain:  Pain level pre-treatment: 1/10   Pain level post-treatment: 1/10   Pain Intervention(s) : Medication (see MAR); Rest, Ice, Repositioning  Response to intervention: Nurse notified, See doc flow    Activity Tolerance:   Fair  Please refer to the flowsheet for vital signs taken during this treatment.   After treatment:   [x]         Patient left in no apparent distress sitting up in chair  []         Patient left in no apparent distress in bed  [x]         Call bell left within reach  [x]         Nursing notified  [x] Caregiver present  []         Bed alarm activated  []         SCDs applied    COMMUNICATION/EDUCATION:   [x]         Role of Physical Therapy in the acute care setting. [x]         Fall prevention education was provided and the patient/caregiver indicated understanding. [x]         Patient/family have participated as able in goal setting and plan of care. [x]         Patient/family agree to work toward stated goals and plan of care. []         Patient understands intent and goals of therapy, but is neutral about his/her participation. []         Patient is unable to participate in goal setting/plan of care: ongoing with therapy staff.  []         Other: Thank you for this referral.  Bucky Diego, PT   Time Calculation: 23 mins      Eval Complexity: History: HIGH Complexity :3+ comorbidities / personal factors will impact the outcome/ POC Exam:MEDIUM Complexity : 3 Standardized tests and measures addressing body structure, function, activity limitation and / or participation in recreation  Presentation: LOW Complexity : Stable, uncomplicated  Clinical Decision Making:Low Complexity  Overall Complexity:LOW     [facility] [address] AM-PAC® Basic Mobility Inpatient Short Form (6-Clicks) Version 2    How much HELP from another person does the patient currently need    (If the patient hasn't done an activity recently, how much help from another person do you think he/she would need if he/she tried?)   Total (Total A or Dep)   A Lot  (Mod to Max A)   A Little (Sup or Min A)   None (Mod I to I)   Turning from your back to your side while in a flat bed without using bedrails? [] 1 [] 2 [x] 3 [] 4   2. Moving from lying on your back to sitting on the side of a flat bed without using bedrails? [] 1 [] 2 [x] 3 [] 4   3. Moving to and from a bed to a chair (including a wheelchair)? [] 1 [] 2 [x] 3 [] 4   4. Standing up from a chair using your arms (e.g., wheelchair, or bedside chair)?    [] 1 [] 2 [x] 3 [] 4 5. Walking in hospital room? [] 1 [] 2 [x] 3 [] 4   6. Climbing 3-5 steps with a railing?+   [] 1 [] 2 [x] 3 [] 4   +If stair climbing cannot be assessed, skip item #6. Sum responses from items 1-5. Based on an AM-PAC score of 18/24 and their current functional mobility deficits, it is recommended that the patient have 3-5 sessions per week of Physical Therapy at d/c to increase the patient's independence.

## 2022-09-27 NOTE — ANESTHESIA PREPROCEDURE EVALUATION
Relevant Problems   No relevant active problems       Anesthetic History     Increased risk of difficult airway     Comments: Was easily intubated recently.      Review of Systems / Medical History  Patient summary reviewed, nursing notes reviewed and pertinent labs reviewed    Pulmonary            Asthma : well controlled       Neuro/Psych         Psychiatric history     Cardiovascular    Hypertension: well controlled              Exercise tolerance: >4 METS     GI/Hepatic/Renal     GERD: well controlled           Endo/Other    Diabetes: well controlled, type 2  Hypothyroidism: well controlled  Morbid obesity and arthritis     Other Findings              Physical Exam    Airway  Mallampati: II  TM Distance: > 6 cm  Neck ROM: normal range of motion   Mouth opening: Normal     Cardiovascular    Rhythm: regular  Rate: normal         Dental         Pulmonary  Breath sounds clear to auscultation               Abdominal  GI exam deferred       Other Findings            Anesthetic Plan    ASA: 3  Anesthesia type: spinal      Post-op pain plan if not by surgeon: peripheral nerve block single    Induction: Intravenous  Anesthetic plan and risks discussed with: Patient and Son / Daughter      Left ACNB for POA

## 2022-09-27 NOTE — PROGRESS NOTES
Problem: Self Care Deficits Care Plan (Adult)  Goal: *Acute Goals and Plan of Care (Insert Text)  Description: Initial Occupational Therapy Goals (9/27/2022) Within 7 day(s):    1. Patient will perform grooming standing sinkside with supervision for increased independence with ADLs. 2. Patient will perform LB dressing with supervision & A/E PRN for increased independence with ADLs. 3. Patient will perform toilet transfer with supervision for increased independence with ADLs. 4. Patient will perform all aspects of toileting with supervision for increased independence with ADLs. 5. Patient will independently apply energy conservation techniques with 1 verbal cue(s)for increased independence with ADLs. 6. Patient will perform bathroom mobility with supervision for increased independence/safety with ADLs. Outcome: Progressing Towards Goal  OCCUPATIONAL THERAPY EVALUATION    Patient: Macy Swartz (64 y.o. female)  Date: 9/27/2022  Primary Diagnosis: Arthrofibrosis of knee joint, left [M24.662]  Procedure(s) (LRB):  REVISION OF LEFT KNEE REPLACEMENT (OPEN SYNOVECTOMY & POLY EXCHANGE) (Left) Day of Surgery   Precautions: Fall, WBAT  PLOF: pt mod I for ADLs/functional mobility    ASSESSMENT AND RECOMMENDATIONS:  Based on the objective data described below, the patient presents with LLE decreased ROM and strength affecting LE ADLs. Pt found supine on stretcher, vitals assessed and WNL, pt reporting pain 1/10, agreeable to therapy. Pt sat up to EOB with SBA. Educated pt on proper body mechanics for ADLs s/p TKR. Pt completed upper body dressing with supervision. Pt able to thread B feet through underwear/pants without assist, and CGA when standing to pull up to waist. Pt required CGA/SBA for STS/bathroom mobility/toilet transfer with vc for safe use of RW. Pt attempted to void however was not able to, SBA for clothing management. Pt ambulated back to recliner, ice applied to L knee.  Spouse present during session for education on home safety. Provided opportunity for pt to voice questions on ADL performance when home, pt has no further concerns. Patient will benefit from skilled Occupational Therapy intervention to maximize safety/independence with ADLs at d/c.    Education: Reviewed home safety, body mechanics, importance of moving every hour to prevent joint stiffness, role of ice for edema/pain control, Rolling Walker management/safety, and adaptive dressing techniques with patient verbalizing  understanding at this time     Patient will benefit from skilled intervention to address the above impairments. Patient's rehabilitation potential is considered to be Good  Factors which may influence rehabilitation potential include:   [x]             None noted  []             Mental ability/status  []             Medical condition  []             Home/family situation and support systems  []             Safety awareness  []             Pain tolerance/management  []             Other:        PLAN :  Recommendations and Planned Interventions:   [x]               Self Care Training                  [x]      Therapeutic Activities  [x]               Functional Mobility Training   []      Cognitive Retraining  [x]               Therapeutic Exercises           []      Endurance Activities  [x]               Balance Training                    []      Neuromuscular Re-Education  []               Visual/Perceptual Training     [x]      Home Safety Training  [x]               Patient Education                   [x]      Family Training/Education  []               Other (comment):    Frequency/Duration: Patient will be followed by Occupational Therapy 1-2 times per day/4-7 days per week to address goals.   Discharge Recommendations: Home health with adult supervision at least 24 hours after d/c  Further Equipment Recommendations for Discharge: N/A    AMPAC: Based on an AM-PAC score of 19/24 and their current ADL deficits; it is recommended that the patient have 2-3 sessions per week of Occupational Therapy at d/c to increase the patient's independence. This [facility] score should be considered in conjunction with interdisciplinary team recommendations to determine the most appropriate discharge setting. Patient's social support, diagnosis, medical stability, and prior level of function should also be taken into consideration. SUBJECTIVE:   Patient stated I'm ready.     OBJECTIVE DATA SUMMARY:     Past Medical History:   Diagnosis Date    Adverse effect of anesthesia     small airway, difficult intubation, pt requesting anesthesia consult    Arthrofibrosis of knee joint, left 9/26/2022    Arthrofibrosis of total knee arthroplasty ([address] 09/26/2021    Asthma     seasonal; no inhaler    Borderline diabetes     Chronic pain     lower back, arms and legs    Depression     Diabetes ([address]     diet control and exercise    Difficult intubation     has a short neck    GERD (gastroesophageal reflux disease)     H/O seasonal allergies     Hypertension     Ill-defined condition     MS    Morbid obesity ([address]     Morbid obesity with BMI of 40.0-44.9, adult ([address]     Multiple sclerosis ([address] 2008    Osteoarthritis of left knee 03/24/2016    Osteoarthritis of right knee     Overactive bladder     Thyroid cancer ([address] 2016     Past Surgical History:   Procedure Laterality Date     [address]    HX GASTRIC BYPASS  2017    gastric sleeve    HX HERNIA REPAIR      umbilical x2    HX HYSTERECTOMY  2004    [facility]    HX KNEE REPLACEMENT Left 2021    HX KNEE REPLACEMENT Left     HX LAP CHOLECYSTECTOMY      2004- [address]    HX ORTHOPAEDIC Bilateral     partial knee replacement    HX ORTHOPAEDIC Left     knee \"ligament repair\"    HX OTHER SURGICAL      fatty tumor removal left arm    HX THYROIDECTOMY  2016    HX TONSILLECTOMY      [place] LAP, RADHA RESTRICT PROC, LONGITUDINAL GASTRECTOMY      9/12/2017     Barriers to Learning/Limitations: yes; physical, post-anesthesia  Compensate with: visual, verbal, tactile, kinesthetic cues/model    Home Situation/Prior Level of Function:   Home Situation  Home Environment: Private residence  # Steps to Enter: 1  One/Two Story Residence: Two story (stair lift)  Lift Chair Available: Yes  Living Alone: No  Support Systems: Child(gen), Spouse/Significant Other  Patient Expects to be Discharged to[de-identified] Home with home health  Current DME Used/Available at Home: Walker, rolling, Cane, straight  Tub or Shower Type: Shower  []  Right hand dominant   []  Left hand dominant    Cognitive/Behavioral Status:  Neurologic State: Alert  Orientation Level: Oriented to person;Oriented to place;Oriented to situation  Cognition: Follows commands  Safety/Judgement: Awareness of environment    Skin: L knee incision w/ Mepilex   Edema: compression hose in place & applied ice     Coordination: BUE  Coordination: Within functional limits  Fine Motor Skills-Upper: Left Intact; Right Intact    Gross Motor Skills-Upper: Left Intact; Right Intact    Balance:  Sitting: Intact  Standing: Intact; With support    Strength: BUE  Strength: Generally decreased, functional     Tone & Sensation:BUE  Tone: Normal  Sensation: Impaired (L knee)    Range of Motion: BUE  AROM: Generally decreased, functional  PROM: Generally decreased, functional    Functional Mobility and Transfers for ADLs:  Bed Mobility:  Supine to Sit: Stand-by assistance  Scooting: Stand-by assistance  Transfers:  Sit to Stand: Contact guard assistance (vc)   Toilet Transfer : Contact guard assistance;Stand-by assistance   Bathroom Mobility: Contact guard assistance;Stand-by assistance    ADL Assessment:  Feeding: Independent  Oral Facial Hygiene/Grooming: Stand-by assistance  Bathing: Contact guard assistance  Upper Body Dressing: Supervision  Lower Body Dressing: Contact guard assistance  Toileting: Stand by assistance    ADL Intervention:  Upper Body Dressing Assistance  Dressing Assistance: Supervision  Bra: Supervision  Pullover Shirt: Supervision    Lower Body Dressing Assistance  Dressing Assistance: Contact guard assistance  Underpants: Contact guard assistance  Pants With Elastic Waist: Contact guard assistance  Leg Crossed Method Used: No  Position Performed: Seated edge of bed  Cues: Verbal cues provided;Visual cues provided    Toileting  Toileting Assistance: Stand-by assistance  Clothing Management: Stand-by assistance    Cognitive Retraining  Safety/Judgement: Awareness of environment    Pain:  Pain level pre-treatment: 1/10  Pain level post-treatment: 1/10  Pain Intervention(s): Rest, Ice, Repositioning   Response to intervention: Nurse notified, see doc flow     Activity Tolerance:   Fair. Patient able to stand ~5 minute(s). Patient able to complete ADLs with intermittent rest breaks. Patient limited by pain, strength, ROM. Patient unsteady. Please refer to the flowsheet for vital signs taken during this treatment. After treatment:   [x]  Patient left in no apparent distress sitting up in chair  []  Patient sitting on EOB  []  Patient left in no apparent distress in bed  [x]  Call bell left within reach  [x]  Nursing notified  []  Caregiver present  [x]  Ice applied  []  SCD's on while back in bed  [] Bed alarm activated    COMMUNICATION/EDUCATION:   Communication/Collaboration:  [x]       Role of Occupational Therapy in the acute care setting. [x]      Home safety education was provided and the patient/caregiver indicated understanding. [x]      Patient/family have participated as able in goal setting and plan of care. [x]      Patient/family agree to work toward stated goals and plan of care. []      Patient understands intent and goals of therapy, but is neutral about his/her participation. []      Patient is unable to participate in plan of care at this time.     Thank you for this referral.  Clint El OTR/L  Time Calculation: 23 mins    Eval Complexity: History: MEDIUM Complexity : Expanded review of history including physical, cognitive and psychosocial  history ; Examination: LOW Complexity : 1-3 performance deficits relating to physical, cognitive , or psychosocial skils that result in activity limitations and / or participation restrictions ; Decision Making:LOW Complexity : No comorbidities that affect functional and no verbal or physical assistance needed to complete eval tasks     [address] [place] [id number] AM-PAC® Daily Activity Inpatient Short Form (6-Clicks)*    How much HELP from another person does the patient currently need    (If the patient hasn't done an activity recently, how much help from another person do you think he/she would need if he/she tried?)   Total (Total A or Dep)   A Lot  (Mod to Max A)   A Little (Sup or Min A)   None (Mod I to I)   Putting on and taking off regular lower body clothing? [] 1 [] 2 [x] 3 [] 4   2. Bathing (including washing, rinsing,      drying)? [] 1 [] 2 [x] 3 [] 4   3. Toileting, which includes using toilet, bedpan or urinal?   [] 1 [] 2 [x] 3 [] 4   4. Putting on and taking off regular upper body clothing? [] 1 [] 2 [x] 3 [] 4   5. Taking care of personal grooming such as brushing teeth? [] 1 [] 2 [x] 3 [] 4   6. Eating meals? [] 1 [] 2 [] 3 [x] 4     Based on an AM-PAC score of 19/24 and their current ADL deficits; it is recommended that the patient have 2-3 sessions per week of Occupational Therapy at d/c to increase the patient's independence.

## 2022-09-27 NOTE — INTERVAL H&P NOTE
Update History & Physical    The Patient's History and Physical of September 26, 2022 was reviewed with the patient and I examined the patient. There was no change. The surgical site was confirmed by the patient and me. Plan:  The risk, benefits, expected outcome, and alternative to the recommended procedure have been discussed with the patient. Patient understands and wants to proceed with the procedure.     Electronically signed by Zach Aragon MD on 9/27/2022 at 7:06 AM

## 2022-09-27 NOTE — PERIOP NOTES
TRANSFER - OUT REPORT:    Verbal report given to Jeanie Lewis RN (name) on Valentino Doctor  being transferred to Phase 2 recovery (unit) for routine progression of care       Report consisted of patients Situation, Background, Assessment and   Recommendations(SBAR). Information from the following report(s) SBAR, Kardex, Procedure Summary, Intake/Output, and MAR was reviewed with the receiving nurse. Lines:   Peripheral IV 09/27/22 Left Hand (Active)   Site Assessment Clean, dry, & intact 09/27/22 0950   Phlebitis Assessment 0 09/27/22 0950   Infiltration Assessment 0 09/27/22 0950   Dressing Status Clean, dry, & intact 09/27/22 0950   Dressing Type Tape;Transparent 09/27/22 0950   Hub Color/Line Status Green; Infusing 09/27/22 0950        Opportunity for questions and clarification was provided.       Patient transported with:   Registered Nurse

## 2022-09-27 NOTE — ANESTHESIA PROCEDURE NOTES
Spinal Block    Start time: 9/27/2022 8:13 AM  End time: 9/27/2022 8:17 AM  Performed by: Aiyana Anaya CRNA  Authorized by: Kallie Pierre DO     Pre-procedure:   Indications: at surgeon's request, post-op pain management, procedure for pain and primary anesthetic  Preanesthetic Checklist: patient identified, risks and benefits discussed, anesthesia consent, site marked, patient being monitored, timeout performed and fire risk safety assessment completed and verbalized    Timeout Time: 08:13 EDT      Spinal Block:   Patient Position:  Seated    Prep: chlorhexidine      Location:  L3-4  Technique:  Single shot  Local: mepivacaine-pf (CARBOCAINE-PF) 1.5% PF injection - Other   45 mg - 9/27/2022 8:17:00 AM    Med Admin Time: 9/27/2022 8:17 AM    Needle:   Needle Type:  Pencan  Needle Gauge:  24 G        Events: CSF confirmed, no blood with aspiration and no paresthesia        Assessment:  Insertion:  Uncomplicated  Patient tolerance:  Patient tolerated the procedure well with no immediate complications

## 2022-09-27 NOTE — ANESTHESIA POSTPROCEDURE EVALUATION
Procedure(s):  REVISION OF LEFT KNEE REPLACEMENT (OPEN SYNOVECTOMY & POLY EXCHANGE). spinal    Anesthesia Post Evaluation      Multimodal analgesia: multimodal analgesia used between 6 hours prior to anesthesia start to PACU discharge  Patient location during evaluation: PACU  Patient participation: complete - patient participated  Level of consciousness: awake and alert  Pain score: 0  Pain management: adequate  Airway patency: patent  Anesthetic complications: no  Cardiovascular status: acceptable  Respiratory status: acceptable  Hydration status: acceptable  Post anesthesia nausea and vomiting:  none  Final Post Anesthesia Temperature Assessment:  Normothermia (36.0-37.5 degrees C)      INITIAL Post-op Vital signs:   Vitals Value Taken Time   /63 09/27/22 1007   Temp 36.1 °C (97 °F) 09/27/22 1000   Pulse 64 09/27/22 1007   Resp 13 09/27/22 1007   SpO2 100 % 09/27/22 1007   Vitals shown include unvalidated device data.

## 2022-09-27 NOTE — DISCHARGE SUMMARY
[address] [facility] [place] [address]   [facility] [address]     DISCHARGE SUMMARY     PATIENT: Claribel Moraes     MRN: [id number]   ADMIT DATE: 2022   [facility]   DISCHARGE DATE: 2022     ATTENDING: Candida Fonseca MD   DICTATING: HADLEY Rangel         ADMISSION DIAGNOSIS: Arthrofibrosis of knee joint, left [M24.662]    DISCHARGE DIAGNOSIS: Status post REVISION LEFT KNEE ARTHROPLASTY - OPEN SYNOVECTOMY AND POLY EXCHANGE    HISTORY OF PRESENT ILLNESS: The patient is a 57y.o. year-old female   with ongoing left knee pain secondary to arthrofibrosis of her left knee prosthesis. The patient's pain has persisted and progressed despite conservative treatments and therapies. The patient has at this time opted for surgical intervention.     PAST MEDICAL HISTORY:   Past Medical History:   Diagnosis Date    Adverse effect of anesthesia     small airway, difficult intubation, pt requesting anesthesia consult    Arthrofibrosis of knee joint, left 2022    Arthrofibrosis of total knee arthroplasty ([address] 2021    Asthma     seasonal; no inhaler    Borderline diabetes     Chronic pain     lower back, arms and legs    Depression     Diabetes ([address]     diet control and exercise    Difficult intubation     has a short neck    GERD (gastroesophageal reflux disease)     H/O seasonal allergies     Hypertension     Ill-defined condition     MS    Morbid obesity ([address]     Morbid obesity with BMI of 40.0-44.9, adult ([address]     Multiple sclerosis ([address]     Osteoarthritis of left knee 2016    Osteoarthritis of right knee     Overactive bladder     Thyroid cancer ([address] 2016       PAST SURGICAL HISTORY:   Past Surgical History:   Procedure Laterality Date     [address]    HX GASTRIC BYPASS  2017    gastric sleeve    HX HERNIA REPAIR      umbilical x2    HX HYSTERECTOMY  2004    [facility]    HX KNEE REPLACEMENT Left     HX KNEE REPLACEMENT Left     HX LAP CHOLECYSTECTOMY      2004- [facility]    HX ORTHOPAEDIC Bilateral     partial knee replacement    HX ORTHOPAEDIC Left     knee \"ligament repair\"    HX OTHER SURGICAL      fatty tumor removal left arm    HX THYROIDECTOMY  2016    HX TONSILLECTOMY      DC LAP, RADHA RESTRICT PROC, LONGITUDINAL GASTRECTOMY      9/12/2017       ALLERGIES:   Allergies   Allergen Reactions    Shellfish Derived Anaphylaxis, Shortness of Breath and Swelling     Able to use topical betadine w/out issues. Adhesive Tape-Silicones Other (comments)     Skin peels and blister    Dilaudid [Hydromorphone] Other (comments)     \"uncontrollable crying\"    Oxycodone-Acetaminophen Itching     Itching of face  ** able to take tylenol w/out issues        CURRENT MEDICATIONS:  A list of medications prior to the time of admission include:  Prior to Admission medications    Medication Sig Start Date End Date Taking? Authorizing Provider   oxyCODONE IR (ROXICODONE) 5 mg immediate release tablet Take 1 Tablet by mouth every four (4) hours as needed for Pain for up to 7 days. Max Daily Amount: 30 mg. 9/27/22 10/4/22 Yes Daniela Osuna PA   cefadroxil (DURICEF) 500 mg capsule Take 1 Capsule by mouth two (2) times a day for 5 days. 9/27/22 10/2/22 Yes Daniela Osuna PA   apixaban (Eliquis) 2.5 mg tablet Take 1 Tablet by mouth two (2) times a day for 14 days. 9/27/22 10/11/22 Yes Cami Osuna PA   Biotin 2,500 mcg cap Take  by mouth. Yes Provider, Historical   telmisartan (MICARDIS) 20 mg tablet Take 20 mg by mouth nightly. Yes Provider, Historical   fluticasone propionate (FLONASE NA) by Nasal route. Yes Provider, Historical   atorvastatin (LIPITOR) 10 mg tablet Take 10 mg by mouth nightly. Yes Provider, Historical   pantoprazole (PROTONIX) 20 mg tablet Take 20 mg by mouth nightly. Yes Provider, Historical   ocrelizumab (OCREVUS) 30 mg/mL soln injection by IntraVENous route.  q6months   Yes Provider, Historical   cyanocobalamin 1,000 mcg tablet Take 500 mcg by mouth every seven (7) days. Yes Provider, Historical   calcium-cholecalciferol, d3, 600-125 mg-unit tab Take 600 mg by mouth daily. Yes Provider, Historical   cholecalciferol (VITAMIN D3) 25 mcg (1,000 unit) cap Take 1,000 Units by mouth daily. Yes Provider, Historical   b complex vitamins tablet Take 1 Tab by mouth daily. Yes Provider, Historical   levothyroxine (SYNTHROID) 112 mcg tablet Take 88 mcg by mouth Daily (before breakfast). Indications: additional treatment for thyroid cancer   Yes Provider, Historical   acetaminophen (TYLENOL) 500 mg tablet Take 500 mg by mouth every six (6) hours as needed for Pain. Yes Provider, Historical   multivitamin (ONE A DAY) tablet Take 1 Tab by mouth daily. Yes Provider, Historical   gabapentin (NEURONTIN) 300 mg capsule Take 600 mg by mouth two (2) times a day. Yes Provider, Historical   baclofen (LIORESAL) 10 mg tablet Take 10 mg by mouth two (2) times a day. Indications: MS   Yes Provider, Historical   cycloSPORINE (RESTASIS) 0.05 % ophthalmic emulsion Administer 1 Drop to both eyes two (2) times a day. Yes Provider, Historical   DULoxetine (CYMBALTA) 60 mg capsule Take 60 mg by mouth nightly. Indications: ANXIETY WITH DEPRESSION, NEUROPATHIC PAIN   Yes Provider, Historical   EPINEPHrine (EPIPEN) 0.3 mg/0.3 mL (1:1,000) injection 0.3 mg by IntraMUSCular route once as needed for Anaphylaxis (Iodine/Shellfish). Indications: ANAPHYLAXIS    Provider, Historical       FAMILY HISTORY: History reviewed. No pertinent family history.     SOCIAL HISTORY:   Social History     Socioeconomic History    Marital status:    Tobacco Use    Smoking status: Former     Types: Cigarettes     Quit date: 3/9/2002     Years since quittin.5    Smokeless tobacco: Never   Vaping Use    Vaping Use: Never used   Substance and Sexual Activity    Alcohol use: Yes     Comment: one to two glasses of wine a month    Drug use: Yes     Frequency: 2.0 times per week     Types: Marijuana     Comment: edibles; patient advised to hold for 7 days prior to DOS. Sexual activity: Yes       REVIEW OF SYSTEMS: All review of systems are negative. PHYSICAL EXAMINATION: For a detailed physical exam, please refer to the patient's chart. HOSPITAL COURSE: The patient was taken to surgery the day of admission. she underwent a revision  left total replacement with open synovectomy and poly exchange. Operative course was benign. Estimated blood loss was approximately 150 cc. The patient was taken to the PACU in stable condition and was later taken discharged home in stable condition. During her hospital stay, the patient progressed well with physical therapy and occupational therapy, adherent to instructions. she had been cleared by physical therapy with stair training. she was placed on Aspirin for DVT prophylaxis. her pain has been well controlled with oral pain medications. her vitals have remained stable. she has also remained hemodynamically stable. The patient has been recommended for discharge home. DISCHARGE INSTRUCTIONS: The patient is to be discharged home with the following medication changes:     Discharge Medication List as of 9/27/2022 10:10 AM        START taking these medications    Details   oxyCODONE IR (ROXICODONE) 5 mg immediate release tablet Take 1 Tablet by mouth every four (4) hours as needed for Pain for up to 7 days. Max Daily Amount: 30 mg., Normal, Disp-42 Tablet, R-0Supervising physician Dr Victoriano Chin MD      cefadroxil (DURICEF) 500 mg capsule Take 1 Capsule by mouth two (2) times a day for 5 days. , Normal, Disp-10 Capsule, R-0      aspirin (ASPIRIN) 325 mg tablet Take 1 Tablet by mouth two (2) times a day for 21 days. , Normal, Disp-42 Tablet, R-0           CONTINUE these medications which have NOT CHANGED    Details   Biotin 2,500 mcg cap Take  by mouth., Historical Med      telmisartan (MICARDIS) 20 mg tablet Take 20 mg by mouth nightly., Historical Med      fluticasone propionate (FLONASE NA) by Nasal route., Historical Med      atorvastatin (LIPITOR) 10 mg tablet Take 10 mg by mouth nightly., Historical Med      pantoprazole (PROTONIX) 20 mg tablet Take 20 mg by mouth nightly., Historical Med      ocrelizumab (OCREVUS) 30 mg/mL soln injection by IntraVENous route. q6months, Historical Med      cyanocobalamin 1,000 mcg tablet Take 500 mcg by mouth every seven (7) days. , Historical Med      calcium-cholecalciferol, d3, 600-125 mg-unit tab Take 600 mg by mouth daily. , Historical Med      cholecalciferol (VITAMIN D3) 25 mcg (1,000 unit) cap Take 1,000 Units by mouth daily. , Historical Med      b complex vitamins tablet Take 1 Tab by mouth daily. , Historical Med      levothyroxine (SYNTHROID) 112 mcg tablet Take 88 mcg by mouth Daily (before breakfast). Indications: additional treatment for thyroid cancer, Historical Med      acetaminophen (TYLENOL) 500 mg tablet Take 500 mg by mouth every six (6) hours as needed for Pain., Historical Med      multivitamin (ONE A DAY) tablet Take 1 Tab by mouth daily. , Historical Med      gabapentin (NEURONTIN) 300 mg capsule Take 600 mg by mouth two (2) times a day., Historical Med      baclofen (LIORESAL) 10 mg tablet Take 10 mg by mouth two (2) times a day. Indications: MS, Historical Med      cycloSPORINE (RESTASIS) 0.05 % ophthalmic emulsion Administer 1 Drop to both eyes two (2) times a day., Historical Med      DULoxetine (CYMBALTA) 60 mg capsule Take 60 mg by mouth nightly. Indications: ANXIETY WITH DEPRESSION, NEUROPATHIC PAIN, Historical Med      EPINEPHrine (EPIPEN) 0.3 mg/0.3 mL (1:1,000) injection 0.3 mg by IntraMUSCular route once as needed for Anaphylaxis (Iodine/Shellfish).  Indications: ANAPHYLAXIS, Historical Med           STOP taking these medications       omega 3-dha-epa-fish oil (Fish OiL) 100-160-1,000 mg cap Comments:   Reason for Stopping:         CRANBERRY PO Comments: Reason for Stopping:         diclofenac EC (VOLTAREN) 75 mg EC tablet Comments:   Reason for Stopping:                   The patient is to continue at home with home physical therapy 3 times a week to work on gait training, range of motion, strengthening, and weightbearing exercises as tolerated on the left lower extremity. The patient is to progress from a walker to a cane to complete total weightbearing as tolerable. The patient is to continue to keep her incision dry. The patient is to followup with Dr. Jc Qureshi, Rosa Coleman PA-C and/or Erin Zambrano PA-C in the office approximately 10-14 days status post for x-rays and further evaluation.     HADLEY Francois  9/28/2022

## 2022-09-27 NOTE — DISCHARGE INSTRUCTIONS
[address] [place] [address] Sports Medicine   Patient Discharge Instructions    Pauline Gray / [id number] : 1960    Admitted 2022 Discharged: 2022     IF YOU HAVE ANY PROBLEMS ONCE YOU ARE AT HOME CALL THE FOLLOWING NUMBERS:   Main office number: [phone]    Your follow up appointment to see either Dr. Kitty Infante PAWANG, or [facility] as scheduled in 2 weeks. If you are unsure of your appointment date call the office at [phone]. Medication Instructions     Resume your home medictions as directed, you may have directed not to resume supplements until after your follow up. A prescription for pain medication has been given   It is important that you take the medication exactly as they are prescribed. Keep your medication in the bottles provided by the pharmacist and keep a list of the medication names, dosages, and times to be taken in your wallet. Do not take other medications without consulting your doctor. What to do at [address] [place] [address] Ave your prehospital diet. If you have excessive nausea or vomitting call your doctor's office. Be sure to maintain adequate fluid intake. Some pain medications may cause constipation. Remember to drink fluids, stay as active as possible, and eat plenty of fiber-rich foods. Begin In-Home Physical Therapy; 3 times a week to work on gait training, range of motion, strengthening, and weight bearing exercises as tolerable. Continue to use your walker or cane when walking. May progress from the walker to a cane to complete total bearing as tolerable. Patient may shower. Wrap incision with plastic wrap/covering to prevent incision from getting wet. Avoid complete immersion. YOUR DRESSING SHOULD BE CHANGED BY YOUR HOME HEALTH NURSE 5-7 AFTER SURGERY ACCORDING TO THE DATE WRITTEN ON YOUR DRESSING.       When to Call    - Call if you have a temperature greater then 101  - Unable to keep food down  - Are unable to bear any wieght   - Need a pain medication refill     Information obtained by :  I understand that if any problems occur once I am at home I am to contact my physician. I understand and acknowledge receipt of the instructions indicated above. Physician's or R.N.'s Signature                                                                  Date/Time                                                                                                                                              Patient or Representative Signature                                                          Date/Time         DISCHARGE SUMMARY from Nurse    PATIENT INSTRUCTIONS:    After general anesthesia or intravenous sedation, for 24 hours or while taking prescription Narcotics:  Limit your activities  Do not drive and operate hazardous machinery  Do not make important personal or business decisions  Do  not drink alcoholic beverages  If you have not urinated within 8 hours after discharge, please contact your surgeon on call. Report the following to your surgeon:  Excessive pain, swelling, redness or odor of or around the surgical area  Temperature over 100.5  Nausea and vomiting lasting longer than 4 hours or if unable to take medications  Any signs of decreased circulation or nerve impairment to extremity: change in color, persistent  numbness, tingling, coldness or increase pain  Any questions    What to do at Home:  Recommended activity: Activity as tolerated and no driving for today,     If you experience any of the following symptoms as noted above, please follow up with Dr. Alen Schmitt. *  Please give a list of your current medications to your Primary Care Provider.     *  Please update this list whenever your medications are discontinued, doses are      changed, or new medications (including over-the-counter products) are added. *  Please carry medication information at all times in case of emergency situations. These are general instructions for a healthy lifestyle:    No smoking/ No tobacco products/ Avoid exposure to second hand smoke  Surgeon General's Warning:  Quitting smoking now greatly reduces serious risk to your health. Obesity, smoking, and sedentary lifestyle greatly increases your risk for illness    A healthy diet, regular physical exercise & weight monitoring are important for maintaining a healthy lifestyle    You may be retaining fluid if you have a history of heart failure or if you experience any of the following symptoms:  Weight gain of 3 pounds or more overnight or 5 pounds in a week, increased swelling in our hands or feet or shortness of breath while lying flat in bed. Please call your doctor as soon as you notice any of these symptoms; do not wait until your next office visit. The discharge information has been reviewed with the patient and caregiver. The patient and caregiver verbalized understanding. Discharge medications reviewed with the patient and caregiver and appropriate educational materials and side effects teaching were provided.   ___________________________________________________________________________________________________________________________________

## 2022-09-27 NOTE — OP NOTES
[facility]  OPERATIVE REPORT    Name:  Amanda Carrasco  MR#:   [id number]  :  1960  ACCOUNT #:  [de-identified]  DATE OF SERVICE:  2022    PREOPERATIVE DIAGNOSIS:  Stiffness status post left total knee arthroplasty. POSTOPERATIVE DIAGNOSES:  Stiffness status post left total knee arthroplasty with arthrofibrosis, extensive scarring intraarticularly. PROCEDURE PERFORMED:  Open synovectomy of the left knee with polyethylene exchange and lateral retinacular release. SURGEON:  Bradford Deluca MD    ASSISTANT:  Bianka Rae PA-C    ANESTHESIA:  Spinal and adductor canal block. COMPLICATIONS:  None    SPECIMENS REMOVED:  None. IMPLANTS:  [employer] 4 x 8 ultra-congruent polyethylene spacer. ESTIMATED BLOOD LOSS:  Less than 100 mL. PROCEDURE:  After the patient was brought into the operating suite, she was placed supine on the operating room table after she had been anesthetized using a spinal anesthetic. She had been anesthetized in the preop holding area with an adductor canal block. Her left lower extremity was then prepped and draped in a normal sterile orthopedic fashion and she was given appropriate antibiotics IV preoperatively. After proper site identification, her previous skin incision was incised through skin and subcutaneous tissue. Medial arthrotomy was performed. There was a small amount of synovial fluid that was clear and certainly was not abundant and did not look grossly infected. After adequate exposure was obtained, an extensive synovectomy was carried out using the electrocautery. There was significant fibrous overgrowth over the patella and this was excised. A lateral retinacular release was then performed to allow the patella to be mobilized for better exposure. The knee was flexed about 90 degrees and it should be noted that preoperatively she had range of motion from approximately 7 to about 95 to 100 degrees.   Knee was flexed and then the polyethylene spacer was removed with an osteotome. A posterior capsular release was then performed carefully using a curved osteotome, and fibrous tissue that had formed within the notch was then excised. At this point, trial reduction was performed with various polys. With a 4 x 8, she appeared to have full extension, flexion to at least 125 degrees with no instability throughout the range of motion. It was deemed that this would be the appropriate poly. It should also be noted that the patella tracked very nicely using no-touch technique throughout the range of motion. At this point, the knee was irrigated. The 4 x 8 ultra-congruent polyethylene spacer was then inserted into the tibial baseplate and the knee was then once again ranged with full extension, flexion easily to 125 degrees, and no instability throughout the range of motion. The knee was then irrigated copiously with pulse lavage system and then was closed, flexed at about 40 to 45 degrees with the deep fascia closed with #1 Vicryl in a figure-of-eight type stitch and then oversewn with a #2 Stratafix in a running-type stitch. Subcutaneous tissue was closed with 2-0 Vicryl in a simple buried stitch and the skin was closed with ZipLine. Mepilex dressing was applied. She tolerated this well, was transferred to the bed and taken to recovery room, extubated in stable condition. All sponge and needle counts were correct.       MD CHENTE [id number]  D:  09/27/2022 9:02  T:  09/27/2022 12:45  JOB #:  [id number]

## 2022-09-27 NOTE — PERIOP NOTES
Patient placed on Ebony Paws for a minimum of 30 min in  Preop. Pt. Used restroom in pre-op area with assistance.

## 2022-09-27 NOTE — ANESTHESIA PROCEDURE NOTES
Peripheral Block    Start time: 9/27/2022 7:25 AM  End time: 9/27/2022 7:29 AM  Performed by: Lavinia Schulte DO  Authorized by: Lavinia Schulte DO       Pre-procedure: Indications: at surgeon's request and post-op pain management    Preanesthetic Checklist: patient identified, risks and benefits discussed, site marked, timeout performed, anesthesia consent given, patient being monitored and fire risk safety assessment completed and verbalized    Timeout Time: 07:25 EDT      Block Type:   Block Type: Adductor canal block  Laterality:  Left  Monitoring:  Standard ASA monitoring, responsive to questions, continuous pulse ox, frequent vital sign checks, oxygen and heart rate  Injection Technique:  Single shot  Procedures: ultrasound guided and nerve stimulator    Patient Position: supine  Prep: chlorhexidine    Location:  Mid thigh  Needle Type:  Stimuplex  Needle Gauge:  20 G  Needle Localization:  Nerve stimulator and ultrasound guidance  Medication Injected:  DexmedeTOMidine (PRECEDEX) 100 mcg/mL iv solution - Peripheral Nerve Block   20 mcg - 9/27/2022 7:28:00 AM  dexamethasone (PF) (DECADRON) 10 mg/mL injection - Peripheral Nerve Block   4 mg - 9/27/2022 7:28:00 AM  ropivacaine (PF) (NAROPIN) 5 mg/mL (0.5 %) injection - Peripheral Nerve Block   25 mL - 9/27/2022 7:28:00 AM  Med Admin Time: 9/27/2022 7:28 AM    Assessment:  Number of attempts:  1  Injection Assessment:  Incremental injection every 5 mL, no paresthesia, ultrasound image on chart, no intravascular symptoms, negative aspiration for blood and local visualized surrounding nerve on ultrasound  Patient tolerance:  Patient tolerated the procedure well with no immediate complications  10 mL injected near NVM using nerve stim.

## 2022-09-27 NOTE — PERIOP NOTES
TRANSFER - IN REPORT:    Verbal report received from [facility] on Simona Teran  being received from PACU for routine post - op      Report consisted of patients Situation, Background, Assessment and   Recommendations(SBAR). Information from the following report(s) SBAR, Kardex, and MAR was reviewed with the receiving nurse. Opportunity for questions and clarification was provided. Assessment completed upon patients arrival to unit and care assumed.

## 2022-09-28 ENCOUNTER — HOME CARE VISIT (OUTPATIENT)
Dept: SCHEDULING | Facility: HOME HEALTH | Age: 62
End: 2022-09-28
Payer: MEDICARE

## 2022-09-28 VITALS
RESPIRATION RATE: 16 BRPM | TEMPERATURE: 98.1 F | HEART RATE: 87 BPM | SYSTOLIC BLOOD PRESSURE: 157 MMHG | OXYGEN SATURATION: 99 % | DIASTOLIC BLOOD PRESSURE: 81 MMHG

## 2022-09-28 LAB — GLUCOSE BLD STRIP.AUTO-MCNC: 100 MG/DL (ref 70–110)

## 2022-09-28 PROCEDURE — 400018 HH-NO PAY CLAIM PROCEDURE

## 2022-09-28 PROCEDURE — 3331090001 HH PPS REVENUE CREDIT

## 2022-09-28 PROCEDURE — G0151 HHCP-SERV OF PT,EA 15 MIN: HCPCS

## 2022-09-28 PROCEDURE — 3331090002 HH PPS REVENUE DEBIT

## 2022-09-28 PROCEDURE — 400013 HH SOC

## 2022-09-28 NOTE — Clinical Note
Thank you  ----- Message -----  From: Ivan Poole, PT  Sent: 9/28/2022   7:47 PM EDT  To: Coar Olivares MD, Rachel Sanchez LPN, *      Jena Page is a 57 y.o. female referred s/p L TKR revision 9/28/22. PMH: L TKR April '21, manipulation Sept '21, arthrofibrosis L knee, borderline DM, depression, GERD, MS, HTN, morbid obesity, overactive bladder and thyroid cancer. PT orders only. Her next f/u with Dr. Selina Chopra 10/12 and bandage change date 10/4. Plan: 2O9, [id number].

## 2022-09-28 NOTE — HOME HEALTH
S: Patient stated her nerve block wore off last night and she didn't have her pain medication from the pharmacy until this morning. O: PAIN: Patient is taking medication on schedule, educated patient on use of ice for pain and edema and demonstrated proper way to elevate the L LE to reduce swelling and promote terminal knee extension. Patient instructed to ice for 20 min on and 40 min off with skin checks every 5 min 5-6 times daily. WOUND: L knee incision covered in non-removable dressing. Surrounding the bandage there is moderate swelling throughout L LE. Per MD ferguson patient instructed to NOT remove bandage or to get bandage wet. PT/PTA to change mepilex dressing on day that is written on dressing. Next dressing change should be withing 3-5 days. However, if the pt is making a f/u visit with Dr. Kim Love the day after the dressing is due, leave dressing on. If drainage noted, change PRN. L knee extending down to the L ankle is moderately edematous and educated patien to maintain L LE elevation at all times with resting. Patient expressed understanding. ROM:  L knee ext -28 degrees in supine with quad set, L knee flexion 70 degrees with seated knee flexion exercise. Patient reported her knee was straighter now than before surgery and that she had about 90 degrees of knee flexion prior. STRENGTH:Patient demonstrates decreased muscle strength as follows:  R hip flex 4/5  R hip abd 4/5  R hip add 4/5  R hip ext 4/5  R knee flex 4/5  R knee ext 4/5  R ankle DF 4/5  L hip flex 3/5  L hip abd 3/5  L hip ext 3/5  L hip add 3/5  L knee flex 2-/5  L knee ext 2-/5  L ankle DF 3-/5  Patient unable to perform 5 times sit to stand w/o considerable increase in pain this session. BED MOBILITY: Mod A for positioning of LLE in bed with elevation.    EQUIPMENT: FWW, SPC, stair lift  TRANSFERS: patient requires CGA from chair, bed and commode and uses BUE to push up and has a wide base of support and requires AD for intial standing balance. GAIT: patient ambulating 20 ft with Mod A using a FWW. Patient demonstrates the following gait deficits: antalgic gait with decreased TKE and toe out gait pattern . Patient requires VC's for use of her FWW at all times to improve safety and decrease risk of falling. STEPS: there are/is 1 to enter home. Steps not assessed this visit. BALANCE: Tinetti 6/28 and TUG 50 seconds - high fall risk due to reduced strength, gait deviations and decreased efficiency of balance reactions and pain. Patient demonstrates poor use and activation of ankle and hip strategy during standing balance testing and activities. A:ASSESSMENT AND PROGRESS TOWARD GOALS:  Patient demonstrated a positive result to therapy this date as evidenced by an improvement in gait pattern with cues, an understanding of her fall risk and agreement to not walk alone,  and patient expressing an understanding of the rehab plan due to therapy verbal and written instructions. Goals established for increased independence in the home, safe mobility in the home, improvement in strength and balance all designed to reduce fall risk and progress toward independence. Patient will benefit from continued PT intervention to progress toward meeting all established goals     P:3W2, 1W1            PMH/Summary of clinical condition: Kristy Hamman is a 57 y.o. female referred s/p L TKR revision 9/28/22. PMH: L TKR April '21, manipulation Sept '21, arthrofibrosis L knee, borderline DM, depression, GERD, MS, HTN, morbid obesity, overactive bladder and thyroid cancer. PT orders only. Her next f/u with Dr. Gareth Park 10/12 and bandage change date 10/4. Medications reconciled. No changes in medications at this time. Medications are effective at this time. Patient educated in side effects of oxycodone to include increased fall risk and consitpation.  Instructed patient/caregiver to notify SN/PT of signs and symptoms of bleeding gums, blood in urine or stool, easily bruising. Instructed on importance of fall prevention due to risk for bleeding. Social history/ caregivers: lives with her  in a two story home with 1 step to enter and chair lift to second floor master. She requires assistance from her  for ADL's, IADL's, medication management and transportation. Pt/Caregiver instructed on plan of care and are agreeable to plan of care at this time. Plan of care and admission to home health status reported to attending physician: Dr. Severiano Byers  Discharge planning discussed with patient and caregiver. Discharge planning as follows: DC to self and family under MD supervision when all goals are met or maximum potential is reached  Pt/Caregiver did verbalize understanding. Patient education provided this visit: safety, fall risk, HEP, need for assistance at all times with transfers and ambulation  Home exercise program: 1. always have someone with her for assistance when transferring or ambulating   2. stand and walk short distances every hour during the day to increase strength, provide pressure relief and increase independence with transfers  3. Patient/CG instructed in a  HEP as follows: Patient has been instructed and demonstrates good understanding of Dr. Katelin Boyd TKA protocol as follows:  Deep breathing x 5 reps every hour, ankle pumps x 10 reps every hour and  ankle circles  quad sets  Heel slides  Seated knee flexion  x10-20 reps 3 times daily  Patient's response to treatment: Patient stated she was feeling a little better after getting her pain medication this morning. Patient's response to education provided: Patient expressed understanding of her daily HEP and pain management techniques. Continued need for the following skills: MD referral for HHPT following recent hospital stay.  HHPT is medically necessary to address  dx and clinical findings: decreased ROM, decreased strength, impaired gait, decreased ability w stair negotiation, increased swelling, decreased transfer status, decreased endurance, decreased balance and decreased safety, increased pain in order to improve functional mobility/quality of life, decrease burden of care, reduce risk for re-hospitalization, work towards patient's personal goals of return to PLOF w decrease risk for falls.

## 2022-09-29 PROCEDURE — 3331090001 HH PPS REVENUE CREDIT

## 2022-09-29 PROCEDURE — 3331090002 HH PPS REVENUE DEBIT

## 2022-09-30 ENCOUNTER — HOME CARE VISIT (OUTPATIENT)
Dept: HOME HEALTH SERVICES | Facility: HOME HEALTH | Age: 62
End: 2022-09-30
Payer: MEDICARE

## 2022-09-30 ENCOUNTER — HOME CARE VISIT (OUTPATIENT)
Dept: SCHEDULING | Facility: HOME HEALTH | Age: 62
End: 2022-09-30
Payer: MEDICARE

## 2022-09-30 VITALS
SYSTOLIC BLOOD PRESSURE: 132 MMHG | DIASTOLIC BLOOD PRESSURE: 65 MMHG | TEMPERATURE: 97.8 F | HEART RATE: 92 BPM | OXYGEN SATURATION: 98 %

## 2022-09-30 PROCEDURE — G0157 HHC PT ASSISTANT EA 15: HCPCS

## 2022-09-30 PROCEDURE — 3331090001 HH PPS REVENUE CREDIT

## 2022-09-30 PROCEDURE — 3331090002 HH PPS REVENUE DEBIT

## 2022-09-30 NOTE — HOME HEALTH
SUBJECTIVE: Patient reports that she is doing a little better but still having a good amount of pain especially at night and with motion. CAREGIVER INVOLVEMENT/ASSISTANCE NEEDED FOR:  present and assists with ADLs/IADLs as needed  . OBJECTIVE:  See interventions. PATIENT EDUCATION PROVIDED THIS VISIT: Provided education on use of ice pack x 20 minutes every hour as needed, edema management, pain control management, HEP, educated on signs and symptoms of infection. PATIENT RESPONSE TO EDUCATION PROVIDED: Patient verbalized understanding of education provided on this date. PATIENT RESPONSE TO TREATMENT: Patient reports that she is very tired following exercise program and reports increased pain from 2/10 to 6-7/10. Will complete ice pack post treatment session. .  ASSESSMENT OF PROGRESS TOWARD GOALS: Patient is making progress toward goals as noted by improvements in gait quality with instruction, improvements in transfer and bed mobility assistance needs, advancement in HEP. Patient would benefit from continued skilled PT to address limited knee ROM, improve LLE strength, improve over all balance to reduced AD needs, outdoor moblity and car transfer training. Alton Ren PLAN FOR NEXT VISIT: advance HEP as able, reassess knee ROM. THE FOLLOWING DISCHARGE PLANNING WAS DISCUSSED WITH THE PATIENT/CAREGIVER: 3x a week x 1 week, 1x week x 1 week of PT with discharge set for 10/10/22.  MD follow up on 10/11/22 and will continue with outpatient PT.

## 2022-10-01 ENCOUNTER — HOME CARE VISIT (OUTPATIENT)
Dept: HOME HEALTH SERVICES | Facility: HOME HEALTH | Age: 62
End: 2022-10-01
Payer: MEDICARE

## 2022-10-01 PROCEDURE — 3331090001 HH PPS REVENUE CREDIT

## 2022-10-01 PROCEDURE — 3331090002 HH PPS REVENUE DEBIT

## 2022-10-02 PROCEDURE — 3331090001 HH PPS REVENUE CREDIT

## 2022-10-02 PROCEDURE — 3331090002 HH PPS REVENUE DEBIT

## 2022-10-03 ENCOUNTER — HOME CARE VISIT (OUTPATIENT)
Dept: SCHEDULING | Facility: HOME HEALTH | Age: 62
End: 2022-10-03
Payer: MEDICARE

## 2022-10-03 PROCEDURE — G0157 HHC PT ASSISTANT EA 15: HCPCS

## 2022-10-03 PROCEDURE — 3331090001 HH PPS REVENUE CREDIT

## 2022-10-03 PROCEDURE — 3331090002 HH PPS REVENUE DEBIT

## 2022-10-04 VITALS
DIASTOLIC BLOOD PRESSURE: 78 MMHG | TEMPERATURE: 97.8 F | SYSTOLIC BLOOD PRESSURE: 120 MMHG | OXYGEN SATURATION: 97 % | HEART RATE: 93 BPM

## 2022-10-04 PROCEDURE — 3331090001 HH PPS REVENUE CREDIT

## 2022-10-04 PROCEDURE — 3331090002 HH PPS REVENUE DEBIT

## 2022-10-05 ENCOUNTER — HOME CARE VISIT (OUTPATIENT)
Dept: SCHEDULING | Facility: HOME HEALTH | Age: 62
End: 2022-10-05
Payer: MEDICARE

## 2022-10-05 VITALS
DIASTOLIC BLOOD PRESSURE: 70 MMHG | OXYGEN SATURATION: 98 % | TEMPERATURE: 97.8 F | SYSTOLIC BLOOD PRESSURE: 128 MMHG | HEART RATE: 90 BPM

## 2022-10-05 PROCEDURE — 3331090002 HH PPS REVENUE DEBIT

## 2022-10-05 PROCEDURE — G0157 HHC PT ASSISTANT EA 15: HCPCS

## 2022-10-05 PROCEDURE — 3331090001 HH PPS REVENUE CREDIT

## 2022-10-05 NOTE — HOME HEALTH
SUBJECTIVE: Reports that she is more sore today than previously. Reports she is doing her exercises daily. CAREGIVER INVOLVEMENT/ASSISTANCE NEEDED FOR:  present and assist with ADLs and IADLs as needed . OBJECTIVE: See interventions. PATIENT EDUCATION PROVIDED THIS VISIT: Pain management education provided on this date, edema management, HEP program  PATIENT RESPONSE TO EDUCATION PROVIDED: Patient verbalized understanding of education provided. Would benefit from additional reinforcement of HEP for techniques, improvement in knee ROM measures  PATIENT RESPONSE TO TREATMENT: Patient reports decreas in pain with exercise and mobility from 5/10 to 3/10. ASSESSMENT OF PROGRESS TOWARD GOALS:  Patient is making progress toward established goals noted by improvements in transfers demonstrating mod I with all indoor functional transfers, improved knee ROM, improvements in pain levels noted from 5/10 to 3/10 post exercise. Patient is limited secondary to pain and limited ROM. Patient would benefit from continued skilled PT ot address car transfers, outdoor mobility. PLAN FOR NEXT VISIT:  car transfer, outdoor mobility  [address] PATIENT/CAREGIVER: 1w1, 1w1 of PT with discharge week of 10/10/22.  MD follow up on 10/11/22 and will continue with outpatient PT.

## 2022-10-06 LAB — GLUCOSE BLD STRIP.AUTO-MCNC: 100 MG/DL (ref 70–110)

## 2022-10-06 PROCEDURE — 3331090001 HH PPS REVENUE CREDIT

## 2022-10-06 PROCEDURE — 3331090002 HH PPS REVENUE DEBIT

## 2022-10-07 ENCOUNTER — HOME CARE VISIT (OUTPATIENT)
Dept: SCHEDULING | Facility: HOME HEALTH | Age: 62
End: 2022-10-07
Payer: MEDICARE

## 2022-10-07 VITALS
TEMPERATURE: 97.8 F | DIASTOLIC BLOOD PRESSURE: 68 MMHG | OXYGEN SATURATION: 98 % | SYSTOLIC BLOOD PRESSURE: 124 MMHG | HEART RATE: 98 BPM

## 2022-10-07 PROCEDURE — G0157 HHC PT ASSISTANT EA 15: HCPCS

## 2022-10-07 PROCEDURE — 3331090001 HH PPS REVENUE CREDIT

## 2022-10-07 PROCEDURE — 3331090002 HH PPS REVENUE DEBIT

## 2022-10-08 PROCEDURE — 3331090001 HH PPS REVENUE CREDIT

## 2022-10-08 PROCEDURE — 3331090002 HH PPS REVENUE DEBIT

## 2022-10-08 NOTE — HOME HEALTH
Assessment and Summary of Care:  Patient's current functional status before discharge is as follows  Strength: R hip flex 4/5 R hip abd 5/5 R hip add 5/5 R hip ext 5/5 R knee flex 5/5 R knee ext 5/5 R ankle DF 5/5 L hip flex 3/5 L hip abd 4/5 L hip ext 3/5 L hip add 4-/5 L knee flex 3+/5 L knee ext 3/5 L ankle DF /5   ROM:  Patient demonstrates lacking 6 degrees of L knee extension and 85 degrees of L knee flexion. Bed Mobility: demonstrates mod I bed mobility  Transfers: demonstrates mod I with functional transfers  Gait/WC mobility: Demonstrates feet with FWW and supervision and continued cues for heel/toe gait pattern, increased knee extension during stance phase of LLE  Stairs: Patient utlizes chair lift for stair negotiation  Special Tests: Tinetti: 22/28 TUG:15 seconds FTSTS: 20.6 seconds. Recommendations: Patient to discharge week of 10/10 with follow up with MD on 10/12. Recommend outpatient PT for continued skilled services for TKA rehab. Recommend use of SPC in home and use of FWW out of home.

## 2022-10-09 PROCEDURE — 3331090002 HH PPS REVENUE DEBIT

## 2022-10-09 PROCEDURE — 3331090001 HH PPS REVENUE CREDIT

## 2022-10-10 ENCOUNTER — HOME CARE VISIT (OUTPATIENT)
Dept: SCHEDULING | Facility: HOME HEALTH | Age: 62
End: 2022-10-10
Payer: MEDICARE

## 2022-10-10 VITALS
RESPIRATION RATE: 14 BRPM | HEART RATE: 86 BPM | DIASTOLIC BLOOD PRESSURE: 79 MMHG | OXYGEN SATURATION: 98 % | TEMPERATURE: 97.3 F | SYSTOLIC BLOOD PRESSURE: 131 MMHG

## 2022-10-10 PROCEDURE — G0151 HHCP-SERV OF PT,EA 15 MIN: HCPCS

## 2022-10-10 PROCEDURE — 3331090001 HH PPS REVENUE CREDIT

## 2022-10-10 PROCEDURE — 3331090002 HH PPS REVENUE DEBIT

## 2022-10-11 PROCEDURE — 3331090002 HH PPS REVENUE DEBIT

## 2022-10-11 PROCEDURE — 3331090001 HH PPS REVENUE CREDIT

## 2022-10-11 NOTE — HOME HEALTH
S: Patient stated she was doing well and feels she has more ROM this time. O: PAIN: see pain tab   WOUND: patient pulling mepilex dressing down for me to observe but did not want to change the bandage today. ROM: -10-79 degrees AROM L knee   STRENGTH:Patient demonstrates decreased muscle strength as follows:  R hip flex 5/5  R hip abd 5/5  R hip add 5/5  R hip ext 5/5  R knee flex 5/5  R knee ext 5/5  R ankle DF 5/5  L hip flex 4-/5  L hip abd 4-/5  L hip ext 4-/5  L hip add 4-/5  L knee flex 4-/5  L knee ext 4-/5  L ankle DF 4-/5   BED MOBILITY: independent   EQUIPMENT:SPC, stair lift  TRANSFERS: MI  GAIT: MI with SPC and good reciprocal gait pattern  STEPS: MI with step to gait pattern   BALANCE: Tinetti 22/28 and TUG 15 seconds - pt has been free from falls   A:ASSESSMENT AND PROGRESS TOWARD GOALS:Fabiola Madrid received skilled PT s/p L TKA revision. This patient has currently met maximum potential with in home PT and has been discharged to a [facility] and outpatient physical therapy at this time. The following goals have been met: TUG 15 seconds, FTSTS 20 seconds, Tinetti 22/28 and pt is ambulating MI with a SPC. Her AROM this visit was -10-79 degrees. Goals met for wound care, post-op instructional care and medication management. Patient verbalized understanding of all discharge instructions and is in agreement with discharge this visit. P: DC      1. Discharge medication list reconciled with patient and caregiver. Questions regarding medications answered and patient/caregiver advised to refer to MD for any medication questions after discharge. 2. Patient to continue use of the following assistive device for maximum safety: SPC  3. Today's treatment included: review of therapeutic exercise program, reassessment of mobility, transfers, balance and gait. 4. Patient and caregiver demonstrate understanding of DC instructions and repeat verbalization. Patient and caregiver given written copy of instructions.   5. Patient and caregiver given notification of discharge and in agreement with DC this date. 6. MD notified of discharge.

## 2022-10-11 NOTE — CASE COMMUNICATION
Ian Valdivia received skilled PT s/p L TKA revision. This patient has currently met maximum potential with in home PT and has been discharged to a [facility] and outpatient physical therapy at this time. The following goals have been met: TUG 15 seconds, FTSTS 20 seconds, Tinetti 22/28 and pt is ambulating MI with a SPC. Her AROM this visit was -10-79 degrees. Goals met for wound care, post-op instructional care and medication management. Fahad naqvi verbalized understanding of all discharge instructions and is in agreement with discharge this visit.

## 2023-01-12 NOTE — CASE COMMUNICATION
Pippa Dan is a 57 y.o. female referred s/p L TKR revision 9/28/22. PMH: L TKR April '21, manipulation Sept '21, arthrofibrosis L knee, borderline DM, depression, GERD, MS, HTN, morbid obesity, overactive bladder and thyroid cancer. PT orders only. Her next f/u with Dr. Camacho Scot 10/12 and bandage change date 10/4. Plan: 7N3, [id number].
No

## 2023-08-07 NOTE — NURSE NAVIGATOR
Per MBSAQIP requirements:  Letter and email sent requesting follow up appointment. [employer] [facility]  [facility]   Guille Lerma, [address] [facility] [address] [facility] [employer] [facility] [place]  [facility]  [facility]      Dear Patient,    Your health is our main concern. It is important for your health to have follow-up lab work and to see your surgeon at 3 months, 6 months, 9 months and annually after your weight loss surgery. Additionally, the Department of Bariatric Surgery at our hospital is a member of the Metabolic and Bariatric Surgery Accreditation and Quality Improvement Program [facility]). As a participant in this program, we gather information on the outcomes of our patients after surgery. Please call the office for a follow up appointment at [phone]. If you have moved out of the area or have changed surgeons please call us and let us know the name of your doctor. Your health and feedback are important to us. We greatly appreciate your response.        Thank you,  [employer] [place] [facility] [address]

## 2024-03-13 ENCOUNTER — OFFICE VISIT (OUTPATIENT)
Age: 64
End: 2024-03-13

## 2024-03-13 VITALS
TEMPERATURE: 97.2 F | WEIGHT: 212.2 LBS | DIASTOLIC BLOOD PRESSURE: 60 MMHG | HEART RATE: 90 BPM | OXYGEN SATURATION: 99 % | BODY MASS INDEX: 34.1 KG/M2 | SYSTOLIC BLOOD PRESSURE: 121 MMHG | HEIGHT: 66 IN

## 2024-03-13 DIAGNOSIS — K90.9 INTESTINAL MALABSORPTION, UNSPECIFIED TYPE: Primary | ICD-10-CM

## 2024-03-13 ASSESSMENT — PATIENT HEALTH QUESTIONNAIRE - PHQ9
SUM OF ALL RESPONSES TO PHQ QUESTIONS 1-9: 0
2. FEELING DOWN, DEPRESSED OR HOPELESS: 0
1. LITTLE INTEREST OR PLEASURE IN DOING THINGS: 0
SUM OF ALL RESPONSES TO PHQ9 QUESTIONS 1 & 2: 0

## 2024-03-13 NOTE — PATIENT INSTRUCTIONS
After Surgery: Any complete chewable, such as: Lyons’s Complete chewables.    Avoid Lyons sours or gummies.  They lack iron and other important nutrients and also have added sugar.    Continue with chewable vitamin or change to adult complete multivitamin one month after surgery. Menstruating women can take a prenatal vitamin.    Make sure has at least 18 mg iron and 400-800 mcg folic acid):    Vitamin B12, B Complex Vitamin, and Biotin  Start taking within a month after surgery.   Vitamin B12:  1000 mcg of Vitamin B12  at least three times weekly    Must take sublingually (meaning you take it under your tongue) or in a liquid drop form for easy absorption.        B Complex Vitamin: Take a pill or liquid drop form once daily.       Biotin: This vitamin can help prevent hair loss.    Recommend 5mg   (5000 mcg) a day  Biotin is Optional

## 2024-03-13 NOTE — PROGRESS NOTES
Hetal Alexander  is a 63 y.o. female who presents for follow-up about 6.5 years following laparoscopic sleeve gastrectomy.   Surgery related complication: none.  She has lost a total of 48 pounds since surgery.  Body mass index is 34.25 kg/m².     The patient presents today to assess their progress toward their weight loss goal & to address any issues that may be present:   She is tolerating solids without difficulty, reports weight regain and denies vomiting and abdominal pain.    The patients diet choices have been reviewed today and counseling was given.         Taking vitamins as recommended.    The patient's exercise level: moderately active.      Changes in her medical history and medications have been reviewed.      Pain score today:     Comorbidities:    Hypertension: improved  Diabetes: unchanged  Obstructive Sleep Apnea: N/A  Hyperlipidemia: improved  Stress Urinary Incontinence: {N/A  Gastroesophageal Reflux: improved  Weight related arthropathy:improved      Patient Active Problem List   Diagnosis    Arthrofibrosis of total knee arthroplasty (HCC)    Thyroid cancer (HCC)    Osteoarthritis of left knee    Osteoarthritis of right knee    Adverse effect of anesthesia    Asthma    Rheumatoid arthritis (HCC)    S/P gastric surgery    Intestinal malabsorption    Arthrofibrosis of knee joint, left     Past Medical History:   Diagnosis Date    Adverse effect of anesthesia     small airway, difficult intubation, pt requesting anesthesia consult    Arthrofibrosis of knee joint, left 9/26/2022    Arthrofibrosis of total knee arthroplasty (HCC) 09/26/2021    Asthma     seasonal; no inhaler    Borderline diabetes     Chronic pain     lower back, arms and legs    Depression     Diabetes (HCC)     diet control and exercise    Difficult intubation     has a short neck    GERD (gastroesophageal reflux disease)     H/O seasonal allergies     Hypertension     Ill-defined condition     MS    Morbid obesity (HCC)     Morbid

## 2024-03-27 ENCOUNTER — HOSPITAL ENCOUNTER (OUTPATIENT)
Facility: HOSPITAL | Age: 64
Discharge: HOME | End: 2024-03-30
Payer: MEDICARE

## 2024-03-27 DIAGNOSIS — K90.9 INTESTINAL MALABSORPTION, UNSPECIFIED TYPE: ICD-10-CM

## 2024-03-27 LAB
25(OH)D3 SERPL-MCNC: 57.2 NG/ML (ref 30–100)
FERRITIN SERPL-MCNC: 176 NG/ML (ref 8–388)
FOLATE SERPL-MCNC: >20 NG/ML (ref 3.1–17.5)
IRON SERPL-MCNC: 21 UG/DL (ref 50–175)
T4 FREE SERPL-MCNC: 1.3 NG/DL (ref 0.7–1.5)
TSH SERPL DL<=0.05 MIU/L-ACNC: 0.5 UIU/ML (ref 0.36–3.74)
VIT B12 SERPL-MCNC: >2000 PG/ML (ref 211–911)

## 2024-03-27 PROCEDURE — 84425 ASSAY OF VITAMIN B-1: CPT

## 2024-03-27 PROCEDURE — 84439 ASSAY OF FREE THYROXINE: CPT

## 2024-03-27 PROCEDURE — 82306 VITAMIN D 25 HYDROXY: CPT

## 2024-03-27 PROCEDURE — 82728 ASSAY OF FERRITIN: CPT

## 2024-03-27 PROCEDURE — 82746 ASSAY OF FOLIC ACID SERUM: CPT

## 2024-03-27 PROCEDURE — 84443 ASSAY THYROID STIM HORMONE: CPT

## 2024-03-27 PROCEDURE — 83540 ASSAY OF IRON: CPT

## 2024-03-27 PROCEDURE — 36415 COLL VENOUS BLD VENIPUNCTURE: CPT

## 2024-03-27 PROCEDURE — 82607 VITAMIN B-12: CPT

## 2024-04-01 LAB — VIT B1 BLD-SCNC: 132 NMOL/L (ref 66.5–200)

## 2025-08-20 ENCOUNTER — CLINICAL DOCUMENTATION (OUTPATIENT)
Facility: HOSPITAL | Age: 65
End: 2025-08-20

## (undated) DEVICE — SUTURE ETHLN SZ 3-0 L30IN NONABSORBABLE BLK FSL L30MM 3/8 1671H

## (undated) DEVICE — SOL INJ SOD CL 0.9% 100ML BG --

## (undated) DEVICE — PIN GUIDE FIX 3.2X62 MM SCREW [GS903A0620322P] [KOMET MEDICAL]

## (undated) DEVICE — DEVON™ KNEE AND BODY STRAP 60" X 3" (1.5 M X 7.6 CM): Brand: DEVON

## (undated) DEVICE — PREP SKN PREVAIL 40ML APPL --

## (undated) DEVICE — VISIGI 3D®  CALIBRATION SYSTEM  SIZE 36FR STD W/ BULB: Brand: BOEHRINGER® VISIGI 3D™ SLEEVE GASTRECTOMY CALIBRATION SYSTEM, SIZE 36FR W/BULB

## (undated) DEVICE — SOLUTION SCRB 4OZ 10% PVP I POVIDONE IOD TOP PAINT EXIDINE

## (undated) DEVICE — SWAB CULT SGL AMIES W/O CHAR FOR THRT VAG SKIN HRT CULTSWAB

## (undated) DEVICE — FORCEPS BX OVL CUP SERR DISP CAP L 240CM RAD JAW 4

## (undated) DEVICE — SOL IRRIGATION INJ NACL 0.9% 500ML BTL

## (undated) DEVICE — TROCAR ENDOSCP BLDELSS 12X100 MM W/ HNDL STBL SL OPT TIP

## (undated) DEVICE — Device

## (undated) DEVICE — APPLIER CLP L SHFT DIA12MM 20 ROT MULT LIGACLP

## (undated) DEVICE — SUT VCRL + 1 36IN CT1 VIO --

## (undated) DEVICE — SWAB CULT LIQ STUART AGR AERB MOD IN BRK SGL RAYON TIP PLAS 220099] BECTON DICKINSON MICRO]

## (undated) DEVICE — THE CANADY HYBRID PLASMA SCALPEL IS AN ELECTROSURGICAL PLASMA SCALPEL THAT USES AN 85MM BENDABLE PADDLE BLADE TIP. THE ELECTROSURGICAL PLASMA SCALPEL IS USED TO SIMULTANEOUSLY CUT AND COAGULATE BIOLOGICAL TISSUE.: Brand: CANADY HYBRID PLASMA PADDLE BLADE

## (undated) DEVICE — RELOAD STPL H4.1X2MM DIA60MM THCK TISS GRN 6 ROW PWR GST B

## (undated) DEVICE — BLADE SAW 1.19X20X90 MM FOR LG BNE

## (undated) DEVICE — PREP SKN CHLRAPRP APL 26ML STR --

## (undated) DEVICE — DRAIN SURG 15FR L3/16IN SIL RND 3/4 FLUT 3/16IN TRCR

## (undated) DEVICE — NDL SPNE LR LCK 18GX6IN PNK --

## (undated) DEVICE — SUT MONOCRYL PLUS UD 4-0 --

## (undated) DEVICE — TIP APPL L35CM RIG FOR SEAL EVICEL

## (undated) DEVICE — SUT VCRL + 2-0 36IN CT1 UD --

## (undated) DEVICE — TABLE COVER: Brand: CONVERTORS

## (undated) DEVICE — STERILE POLYISOPRENE POWDER-FREE SURGICAL GLOVES: Brand: PROTEXIS

## (undated) DEVICE — SHEAR HARMONIC 5MMX45CM -- ACE 7+

## (undated) DEVICE — KENDALL SCD EXPRESS SLEEVES, KNEE LENGTH, MEDIUM: Brand: KENDALL SCD

## (undated) DEVICE — SUTURE PDS II SZ 0 L27IN ABSRB VLT L26MM CT-2 1/2 CIR Z334H

## (undated) DEVICE — SOLUTION LACTATED RINGERS INJECTION USP

## (undated) DEVICE — BLADE SAW W13XL90MM 1.19MM PARA

## (undated) DEVICE — BARIATRIC: Brand: MEDLINE INDUSTRIES, INC.

## (undated) DEVICE — DRSG MEPILES BORDER AG 4X12 -- 5/BX

## (undated) DEVICE — SOLUTION IRRIG 3000ML LAC R FLX CONT

## (undated) DEVICE — SUTURE ETHIB EXCL BR GRN TAPR PT 2-0 30 X563H X563H

## (undated) DEVICE — NEEDLE INSUF L150MM DIA2MM DISP FOR PNEUMOPERI ENDOPATH

## (undated) DEVICE — AGENT HEMSTAT W6XL9IN OXIDIZED REGENERATED CELOS ABSRB FOR

## (undated) DEVICE — BLADE SAW W11.2XL77.5MM THK0.76MM CUT THK1.17MM REPL RECIP

## (undated) DEVICE — PIN GUIDE FIX 3.2X62 MM SCREW [GS9030620324P] [KOMET MEDICAL]

## (undated) DEVICE — HANDPIECE SET WITH HIGH FLOW TIP AND SUCTION TUBE: Brand: INTERPULSE

## (undated) DEVICE — SUTURE STRATAFIX SYMMETRIC PDS + 1 SGL ARMED CT 18 IN LEN SXPP1A405

## (undated) DEVICE — STAPLER SKIN L440MM 32MM LNG 12 FIRING B FRM PWR + GRIPPING

## (undated) DEVICE — TROCAR ENDOSCP L100MM DIA12MM STBL SL BLDELSS ENDOPATH XCEL

## (undated) DEVICE — NEEDLE SPNL 20GA LNG YEL HUB DISP

## (undated) DEVICE — MAJ-1414 SINGLE USE ADPATER BIOPSY VALV: Brand: SINGLE USE ADAPTOR BIOPSY VALVE

## (undated) DEVICE — TROCAR LAP L100MM DIA5MM BLDELSS W/ STBL SL ENDOPATH XCEL

## (undated) DEVICE — MEDI-VAC NON-CONDUCTIVE SUCTION TUBING: Brand: CARDINAL HEALTH

## (undated) DEVICE — RELOAD STPL H1.8-3.8MM REG THCK TISS G 6 ROW GRIPPING SURF

## (undated) DEVICE — SEALANT HEMSTAT 5ML HUM FIBRIN THROM 2 VI APPL DEV EVICEL

## (undated) DEVICE — SYR IRR CATH TIP LR ADPT 70ML -- CONVERT TO ITEM 363120

## (undated) DEVICE — AMD ANTIMICROBIAL DRAIN SPONGES, 6 PLY, 0.2% POLYHEXAMETHYLENE BIGUANIDE HCI (PHMB): Brand: EXCILON

## (undated) DEVICE — ENDO CARRY-ON PROCEDURE KIT INCLUDES ENZYMATIC SPONGE, GAUZE, BIOHAZARD LABEL, TRAY, LUBRICANT, DIRTY SCOPE LABEL, WATER LABEL, TRAY, DRAWSTRING PAD, AND DEFENDO 4-PIECE KIT.: Brand: ENDO CARRY-ON PROCEDURE KIT

## (undated) DEVICE — ZIP 16 SURGICAL SKIN CLOSURE DEVICE: Brand: ZIP 16 SURGICAL SKIN CLOSURE DEVICE

## (undated) DEVICE — 3L THIN WALL CAN: Brand: CRD

## (undated) DEVICE — TROCAR ENDOSCP L100MM DIA15MM BLDELSS STBL SL ENDOPATH XCEL

## (undated) DEVICE — 3M™ STERI-STRIP™ REINFORCED ADHESIVE SKIN CLOSURES, R1548, 1 IN X 5 IN (25 MM X 125 MM), 4 STRIPS/ENVELOPE: Brand: 3M™ STERI-STRIP™

## (undated) DEVICE — GOWN ISOLATN REG BLU POLY UNISX W/ THMB LOOP